# Patient Record
Sex: FEMALE | Race: WHITE | Employment: OTHER | URBAN - METROPOLITAN AREA
[De-identification: names, ages, dates, MRNs, and addresses within clinical notes are randomized per-mention and may not be internally consistent; named-entity substitution may affect disease eponyms.]

---

## 2017-09-29 ENCOUNTER — HOSPITAL ENCOUNTER (OUTPATIENT)
Dept: GENERAL RADIOLOGY | Age: 77
Discharge: HOME | End: 2017-09-29
Payer: MEDICARE

## 2017-09-29 ENCOUNTER — HOSPITAL ENCOUNTER (OUTPATIENT)
Age: 77
Discharge: HOME | End: 2017-09-29
Payer: MEDICARE

## 2017-09-29 DIAGNOSIS — M54.40 ACUTE RIGHT-SIDED LOW BACK PAIN WITH SCIATICA, SCIATICA LATERALITY UNSPECIFIED: ICD-10-CM

## 2017-09-29 PROCEDURE — 72114 X-RAY EXAM L-S SPINE BENDING: CPT

## 2017-09-29 PROCEDURE — 72170 X-RAY EXAM OF PELVIS: CPT

## 2018-09-06 ENCOUNTER — HOSPITAL ENCOUNTER (OUTPATIENT)
Dept: GENERAL RADIOLOGY | Age: 78
Discharge: HOME | End: 2018-09-08
Payer: MEDICARE

## 2018-09-06 ENCOUNTER — HOSPITAL ENCOUNTER (OUTPATIENT)
Age: 78
Discharge: HOME | End: 2018-09-08
Payer: MEDICARE

## 2018-09-06 ENCOUNTER — HOSPITAL ENCOUNTER (OUTPATIENT)
Age: 78
Discharge: HOME | End: 2018-09-06
Payer: MEDICARE

## 2018-09-06 DIAGNOSIS — M25.551 RIGHT HIP PAIN: ICD-10-CM

## 2018-09-06 DIAGNOSIS — M70.61 TROCHANTERIC BURSITIS OF RIGHT HIP: ICD-10-CM

## 2018-09-06 DIAGNOSIS — M54.41 ACUTE RIGHT-SIDED LOW BACK PAIN WITH RIGHT-SIDED SCIATICA: ICD-10-CM

## 2018-09-06 DIAGNOSIS — E78.00 HYPERCHOLESTEREMIA: ICD-10-CM

## 2018-09-06 LAB
ABSOLUTE EOS #: 0.83 K/UL (ref 0–0.4)
ABSOLUTE IMMATURE GRANULOCYTE: ABNORMAL K/UL (ref 0–0.3)
ABSOLUTE LYMPH #: 2.4 K/UL (ref 1–4.8)
ABSOLUTE MONO #: 0.9 K/UL (ref 0.1–1.3)
ALBUMIN SERPL-MCNC: 4.3 G/DL (ref 3.5–5.2)
ALBUMIN/GLOBULIN RATIO: ABNORMAL (ref 1–2.5)
ALP BLD-CCNC: 78 U/L (ref 35–104)
ALT SERPL-CCNC: 17 U/L (ref 5–33)
ANION GAP SERPL CALCULATED.3IONS-SCNC: 11 MMOL/L (ref 9–17)
AST SERPL-CCNC: 22 U/L
BASOPHILS # BLD: 0 % (ref 0–2)
BASOPHILS ABSOLUTE: 0 K/UL (ref 0–0.2)
BILIRUB SERPL-MCNC: 0.29 MG/DL (ref 0.3–1.2)
BUN BLDV-MCNC: 18 MG/DL (ref 8–23)
BUN/CREAT BLD: ABNORMAL (ref 9–20)
CALCIUM SERPL-MCNC: 9.7 MG/DL (ref 8.6–10.4)
CHLORIDE BLD-SCNC: 102 MMOL/L (ref 98–107)
CHOLESTEROL/HDL RATIO: 2.6
CHOLESTEROL: 145 MG/DL
CO2: 27 MMOL/L (ref 20–31)
CREAT SERPL-MCNC: 0.75 MG/DL (ref 0.5–0.9)
DIFFERENTIAL TYPE: ABNORMAL
EOSINOPHILS RELATIVE PERCENT: 11 % (ref 0–4)
GFR AFRICAN AMERICAN: >60 ML/MIN
GFR NON-AFRICAN AMERICAN: >60 ML/MIN
GFR SERPL CREATININE-BSD FRML MDRD: ABNORMAL ML/MIN/{1.73_M2}
GFR SERPL CREATININE-BSD FRML MDRD: ABNORMAL ML/MIN/{1.73_M2}
GLUCOSE BLD-MCNC: 109 MG/DL (ref 70–99)
HCT VFR BLD CALC: 42.1 % (ref 36–46)
HDLC SERPL-MCNC: 55 MG/DL
HEMOGLOBIN: 13.9 G/DL (ref 12–16)
IMMATURE GRANULOCYTES: ABNORMAL %
LDL CHOLESTEROL: 58 MG/DL (ref 0–130)
LYMPHOCYTES # BLD: 32 % (ref 24–44)
MCH RBC QN AUTO: 30.6 PG (ref 26–34)
MCHC RBC AUTO-ENTMCNC: 33.1 G/DL (ref 31–37)
MCV RBC AUTO: 92.5 FL (ref 80–100)
MONOCYTES # BLD: 12 % (ref 1–7)
MORPHOLOGY: NORMAL
NRBC AUTOMATED: ABNORMAL PER 100 WBC
PDW BLD-RTO: 13.6 % (ref 11.5–14.9)
PLATELET # BLD: 298 K/UL (ref 150–450)
PLATELET ESTIMATE: ABNORMAL
PMV BLD AUTO: 8.2 FL (ref 6–12)
POTASSIUM SERPL-SCNC: 4.6 MMOL/L (ref 3.7–5.3)
RBC # BLD: 4.55 M/UL (ref 4–5.2)
RBC # BLD: ABNORMAL 10*6/UL
SEG NEUTROPHILS: 45 % (ref 36–66)
SEGMENTED NEUTROPHILS ABSOLUTE COUNT: 3.37 K/UL (ref 1.3–9.1)
SODIUM BLD-SCNC: 140 MMOL/L (ref 135–144)
TOTAL PROTEIN: 7.7 G/DL (ref 6.4–8.3)
TRIGL SERPL-MCNC: 161 MG/DL
TSH SERPL DL<=0.05 MIU/L-ACNC: 2.43 MIU/L (ref 0.3–5)
VLDLC SERPL CALC-MCNC: ABNORMAL MG/DL (ref 1–30)
WBC # BLD: 7.5 K/UL (ref 3.5–11)
WBC # BLD: ABNORMAL 10*3/UL

## 2018-09-06 PROCEDURE — 80061 LIPID PANEL: CPT

## 2018-09-06 PROCEDURE — 36415 COLL VENOUS BLD VENIPUNCTURE: CPT

## 2018-09-06 PROCEDURE — 72100 X-RAY EXAM L-S SPINE 2/3 VWS: CPT

## 2018-09-06 PROCEDURE — 80053 COMPREHEN METABOLIC PANEL: CPT

## 2018-09-06 PROCEDURE — 84443 ASSAY THYROID STIM HORMONE: CPT

## 2018-09-06 PROCEDURE — 85025 COMPLETE CBC W/AUTO DIFF WBC: CPT

## 2018-09-06 PROCEDURE — 73502 X-RAY EXAM HIP UNI 2-3 VIEWS: CPT

## 2019-03-11 ENCOUNTER — HOSPITAL ENCOUNTER (OUTPATIENT)
Dept: CT IMAGING | Age: 79
Discharge: HOME | End: 2019-03-13
Payer: MEDICARE

## 2019-03-11 DIAGNOSIS — K43.2 INCISIONAL HERNIA, WITHOUT OBSTRUCTION OR GANGRENE: ICD-10-CM

## 2019-03-11 LAB
BUN BLDV-MCNC: 12 MG/DL (ref 8–23)
CREAT SERPL-MCNC: 0.69 MG/DL (ref 0.5–0.9)
GFR AFRICAN AMERICAN: >60 ML/MIN
GFR NON-AFRICAN AMERICAN: >60 ML/MIN
GFR SERPL CREATININE-BSD FRML MDRD: NORMAL ML/MIN/{1.73_M2}
GFR SERPL CREATININE-BSD FRML MDRD: NORMAL ML/MIN/{1.73_M2}

## 2019-03-11 PROCEDURE — 74177 CT ABD & PELVIS W/CONTRAST: CPT

## 2019-03-11 PROCEDURE — 6360000004 HC RX CONTRAST MEDICATION: Performed by: SURGERY

## 2019-03-11 PROCEDURE — 84520 ASSAY OF UREA NITROGEN: CPT

## 2019-03-11 PROCEDURE — 82565 ASSAY OF CREATININE: CPT

## 2019-03-11 PROCEDURE — 2580000003 HC RX 258: Performed by: SURGERY

## 2019-03-11 PROCEDURE — 36415 COLL VENOUS BLD VENIPUNCTURE: CPT

## 2019-03-11 RX ORDER — 0.9 % SODIUM CHLORIDE 0.9 %
80 INTRAVENOUS SOLUTION INTRAVENOUS ONCE
Status: COMPLETED | OUTPATIENT
Start: 2019-03-11 | End: 2019-03-11

## 2019-03-11 RX ORDER — SODIUM CHLORIDE 0.9 % (FLUSH) 0.9 %
10 SYRINGE (ML) INJECTION PRN
Status: DISCONTINUED | OUTPATIENT
Start: 2019-03-11 | End: 2019-03-14 | Stop reason: HOSPADM

## 2019-03-11 RX ADMIN — IOVERSOL 75 ML: 741 INJECTION INTRA-ARTERIAL; INTRAVENOUS at 16:32

## 2019-03-11 RX ADMIN — Medication 10 ML: at 16:32

## 2019-03-11 RX ADMIN — SODIUM CHLORIDE 80 ML: 9 INJECTION, SOLUTION INTRAVENOUS at 16:32

## 2019-03-11 RX ADMIN — IOHEXOL 50 ML: 240 INJECTION, SOLUTION INTRATHECAL; INTRAVASCULAR; INTRAVENOUS; ORAL at 16:32

## 2019-03-22 ENCOUNTER — HOSPITAL ENCOUNTER (OUTPATIENT)
Dept: PREADMISSION TESTING | Age: 79
Discharge: HOME | End: 2019-03-26
Payer: MEDICARE

## 2019-03-22 VITALS
TEMPERATURE: 97.6 F | HEART RATE: 69 BPM | BODY MASS INDEX: 46.38 KG/M2 | OXYGEN SATURATION: 98 % | DIASTOLIC BLOOD PRESSURE: 78 MMHG | RESPIRATION RATE: 16 BRPM | SYSTOLIC BLOOD PRESSURE: 153 MMHG | HEIGHT: 62 IN | WEIGHT: 252 LBS

## 2019-03-22 LAB
ABSOLUTE EOS #: 0.4 K/UL (ref 0–0.4)
ABSOLUTE IMMATURE GRANULOCYTE: ABNORMAL K/UL (ref 0–0.3)
ABSOLUTE LYMPH #: 2.3 K/UL (ref 1–4.8)
ABSOLUTE MONO #: 1 K/UL (ref 0.1–1.3)
ANION GAP SERPL CALCULATED.3IONS-SCNC: 11 MMOL/L (ref 9–17)
BASOPHILS # BLD: 1 % (ref 0–2)
BASOPHILS ABSOLUTE: 0.1 K/UL (ref 0–0.2)
BUN BLDV-MCNC: 12 MG/DL (ref 8–23)
BUN/CREAT BLD: NORMAL (ref 9–20)
CALCIUM SERPL-MCNC: 10.1 MG/DL (ref 8.6–10.4)
CHLORIDE BLD-SCNC: 100 MMOL/L (ref 98–107)
CO2: 28 MMOL/L (ref 20–31)
CREAT SERPL-MCNC: 0.72 MG/DL (ref 0.5–0.9)
DIFFERENTIAL TYPE: ABNORMAL
EOSINOPHILS RELATIVE PERCENT: 6 % (ref 0–4)
GFR AFRICAN AMERICAN: >60 ML/MIN
GFR NON-AFRICAN AMERICAN: >60 ML/MIN
GFR SERPL CREATININE-BSD FRML MDRD: NORMAL ML/MIN/{1.73_M2}
GFR SERPL CREATININE-BSD FRML MDRD: NORMAL ML/MIN/{1.73_M2}
GLUCOSE BLD-MCNC: 98 MG/DL (ref 70–99)
HCT VFR BLD CALC: 44.6 % (ref 36–46)
HEMOGLOBIN: 14.5 G/DL (ref 12–16)
IMMATURE GRANULOCYTES: ABNORMAL %
LYMPHOCYTES # BLD: 32 % (ref 24–44)
MCH RBC QN AUTO: 29.4 PG (ref 26–34)
MCHC RBC AUTO-ENTMCNC: 32.6 G/DL (ref 31–37)
MCV RBC AUTO: 90.3 FL (ref 80–100)
MONOCYTES # BLD: 14 % (ref 1–7)
NRBC AUTOMATED: ABNORMAL PER 100 WBC
PDW BLD-RTO: 13.9 % (ref 11.5–14.9)
PLATELET # BLD: 325 K/UL (ref 150–450)
PLATELET ESTIMATE: ABNORMAL
PMV BLD AUTO: 7.9 FL (ref 6–12)
POTASSIUM SERPL-SCNC: 4.9 MMOL/L (ref 3.7–5.3)
RBC # BLD: 4.94 M/UL (ref 4–5.2)
RBC # BLD: ABNORMAL 10*6/UL
SEG NEUTROPHILS: 47 % (ref 36–66)
SEGMENTED NEUTROPHILS ABSOLUTE COUNT: 3.4 K/UL (ref 1.3–9.1)
SODIUM BLD-SCNC: 139 MMOL/L (ref 135–144)
WBC # BLD: 7.3 K/UL (ref 3.5–11)
WBC # BLD: ABNORMAL 10*3/UL

## 2019-03-22 PROCEDURE — 80048 BASIC METABOLIC PNL TOTAL CA: CPT

## 2019-03-22 PROCEDURE — 85025 COMPLETE CBC W/AUTO DIFF WBC: CPT

## 2019-03-22 PROCEDURE — 93005 ELECTROCARDIOGRAM TRACING: CPT

## 2019-03-22 PROCEDURE — 36415 COLL VENOUS BLD VENIPUNCTURE: CPT

## 2019-03-22 RX ORDER — ASPIRIN 325 MG
325 TABLET ORAL DAILY
COMMUNITY

## 2019-03-22 RX ORDER — ESOMEPRAZOLE MAGNESIUM 20 MG/1
20 FOR SUSPENSION ORAL DAILY
COMMUNITY

## 2019-03-22 NOTE — H&P (VIEW-ONLY)
HISTORY and [facility]       NAME:  Valeriy Tripathi  MRN: [id number]   YOB: 1940   Date: 3/22/2019   Age: 69 y.o. Gender: female       COMPLAINT AND PRESENT HISTORY:   Valeriy Tripathi is 69 y.o.,  female, Preadmission Testing for  Incisional  Hernia. Patient will be having OPEN HERNIA INCISIONAL REPAIR W/MESH. Patient has symptoms of : discomfort with straining or lifting. Patient admits to straining with bowel movements. Patient noticed the Hernia 1 month ago. The Hernia grew in size. The Hernia is non tender,  expansile on coughing, no signs of bowel obstruction. No fever or chills. PAST MEDICAL HISTORY     Past Medical History:   Diagnosis Date    Arthritis     Borderline diabetic     controlled with diet    CAD (coronary artery disease)     Constipation     GERD (gastroesophageal reflux disease)     Hyperlipidemia     Hypertension     Incisional hernia     Wears glasses        Pt denies any history of Diabetes mellitus type 2,  stroke, , COPD, Asthma,  Cancer, Seizures,Thyroid disease, Kidney Disease, Hepatitis, TB, Psychiatric Disorders or Substance abuse. SURGICAL HISTORY       Past Surgical History:   Procedure Laterality Date    CARPAL TUNNEL RELEASE Bilateral     2 times on the right and 2 times on the left    CHOLECYSTECTOMY      HYSTERECTOMY  1979    \"they took part of the uterus. I still have periods but they were every other month\", doesn't know why    KNEE ARTHROSCOPY Left     NECK SURGERY  2001    titanium screw in neck by Dr. Tyler Louie Right        FAMILY HISTORY       Family History   Problem Relation Age of Onset    Diabetes Mother     Heart Failure Mother     Heart Attack Father        SOCIAL HISTORY       Social History     Socioeconomic History    Marital status:       Spouse name: None    Number of children: None    Years of education: None    Highest education level: None Occupational History    None   Social Needs    Financial resource strain: None    Food insecurity:     Worry: None     Inability: None    Transportation needs:     Medical: None     Non-medical: None   Tobacco Use    Smoking status: Never Smoker    Smokeless tobacco: Never Used   Substance and Sexual Activity    Alcohol use: No     Alcohol/week: 0.0 oz    Drug use: Never    Sexual activity: None   Lifestyle    Physical activity:     Days per week: None     Minutes per session: None    Stress: None   Relationships    Social connections:     Talks on phone: None     Gets together: None     Attends Gnosticism service: None     Active member of club or organization: None     Attends meetings of clubs or organizations: None     Relationship status: None    Intimate partner violence:     Fear of current or ex partner: None     Emotionally abused: None     Physically abused: None     Forced sexual activity: None   Other Topics Concern    None   Social History Narrative    None           REVIEW OF SYSTEMS      Allergies   Allergen Reactions    Medrol [Methylprednisolone] Other (See Comments)     Unknown reaction       Current Outpatient Medications on File Prior to Encounter   Medication Sig Dispense Refill    esomeprazole Magnesium (NEXIUM) 20 MG PACK Take 20 mg by mouth daily      aspirin 325 MG tablet Take 162.5 mg by mouth daily Takes 1/2 tablet of aspirin      simvastatin (ZOCOR) 40 MG tablet Take 1 tablet by mouth nightly 90 tablet 1    metoprolol tartrate (LOPRESSOR) 50 MG tablet Take 1 tablet by mouth 2 times daily 180 tablet 1    lisinopril (PRINIVIL;ZESTRIL) 40 MG tablet Take 1/2 tab by mouth daily 90 tablet 1    isosorbide mononitrate (IMDUR) 60 MG extended release tablet Take 1 tablet by mouth daily 90 tablet 1    amLODIPine (NORVASC) 5 MG tablet Take 1 tablet by mouth daily 90 tablet 1    ibuprofen (ADVIL;MOTRIN) 800 MG tablet Take 1 tablet by mouth every 6 hours as needed for Pain 120 tablet 3     No current facility-administered medications on file prior to encounter. General health:  Fairly good. No fever or chills. Skin:  No itching, redness or rash. HEENT:  No headache, epistaxis or sore throat. Neck:  No pain, stiffness or masses. Cardiovascular/Respiratory system:  No chest pain, palpitation or shortness of breath. Gastrointestinal tract: See HPI. Genitourinary:  No burning on micturition. No hesitancy, urgency, frequency or discoloration of urine. Locomotor:  No bone or joint pains. No swelling. Neuropsychiatric:  No referable complaints. GENERAL PHYSICAL EXAM:     Vitals: BP (!) 153/78   Pulse 69   Temp 97.6 °F (36.4 °C) (Oral)   Resp 16   Ht 5' 2\" (1.575 m)   Wt 252 lb (114.3 kg)   SpO2 98%   BMI 46.09 kg/m²  Body mass index is 46.09 kg/m². GENERAL APPEARANCE:   Elizabeth Lazcano is 69 y.o.,  female, severely obese, nourished, conscious, alert. Does not appear to be distress or pain at this time. SKIN:  Warm, dry, no cyanosis or jaundice. HEAD:  Normocephalic, atraumatic, no swelling or tenderness. EYES:  Pupils equal, reactive to light. EARS:  No discharge, no marked hearing loss. NOSE:  No rhinorrhea, epistaxis or septal deformity. THROAT:  Not congested. No ulceration bleeding or discharge. NECK:  No stiffness, trachea central.  No palpable masses or L.N.                 CHEST:  Symmetrical and equal on expansion. HEART:  RRR S1 > S2. No audible murmurs or gallops. LUNGS:  Equal on expansion, normal breath sounds. No adventitious sounds. ABDOMEN:  Obese. Soft on palpation. No localized tenderness. No guarding or rigidity. No palpable hepatosplenomegaly.

## 2019-04-05 ENCOUNTER — ANESTHESIA (OUTPATIENT)
Dept: OPERATING ROOM | Age: 79
End: 2019-04-05
Payer: MEDICARE

## 2019-04-05 ENCOUNTER — HOSPITAL ENCOUNTER (OUTPATIENT)
Age: 79
Setting detail: OUTPATIENT SURGERY
Discharge: HOME | End: 2019-04-05
Attending: SURGERY | Admitting: SURGERY
Payer: MEDICARE

## 2019-04-05 ENCOUNTER — ANESTHESIA EVENT (OUTPATIENT)
Dept: OPERATING ROOM | Age: 79
End: 2019-04-05
Payer: MEDICARE

## 2019-04-05 VITALS — DIASTOLIC BLOOD PRESSURE: 87 MMHG | TEMPERATURE: 95.4 F | OXYGEN SATURATION: 92 % | SYSTOLIC BLOOD PRESSURE: 176 MMHG

## 2019-04-05 VITALS
SYSTOLIC BLOOD PRESSURE: 110 MMHG | HEART RATE: 55 BPM | RESPIRATION RATE: 16 BRPM | HEIGHT: 62 IN | BODY MASS INDEX: 46.38 KG/M2 | TEMPERATURE: 97 F | WEIGHT: 252 LBS | DIASTOLIC BLOOD PRESSURE: 59 MMHG | OXYGEN SATURATION: 100 %

## 2019-04-05 DIAGNOSIS — K43.2 INCISIONAL HERNIA WITHOUT OBSTRUCTION OR GANGRENE: Primary | ICD-10-CM

## 2019-04-05 LAB — GLUCOSE BLD-MCNC: 111 MG/DL (ref 65–105)

## 2019-04-05 PROCEDURE — 7100000031 HC ASPR PHASE II RECOVERY - ADDTL 15 MIN: Performed by: SURGERY

## 2019-04-05 PROCEDURE — 2500000003 HC RX 250 WO HCPCS: Performed by: SURGERY

## 2019-04-05 PROCEDURE — 7100000011 HC PHASE II RECOVERY - ADDTL 15 MIN: Performed by: SURGERY

## 2019-04-05 PROCEDURE — 7100000010 HC PHASE II RECOVERY - FIRST 15 MIN: Performed by: SURGERY

## 2019-04-05 PROCEDURE — 6360000002 HC RX W HCPCS: Performed by: ANESTHESIOLOGY

## 2019-04-05 PROCEDURE — 3700000001 HC ADD 15 MINUTES (ANESTHESIA): Performed by: SURGERY

## 2019-04-05 PROCEDURE — 3600000003 HC SURGERY LEVEL 3 BASE: Performed by: SURGERY

## 2019-04-05 PROCEDURE — 82947 ASSAY GLUCOSE BLOOD QUANT: CPT

## 2019-04-05 PROCEDURE — 3700000000 HC ANESTHESIA ATTENDED CARE: Performed by: SURGERY

## 2019-04-05 PROCEDURE — 6360000002 HC RX W HCPCS: Performed by: NURSE ANESTHETIST, CERTIFIED REGISTERED

## 2019-04-05 PROCEDURE — 2580000003 HC RX 258: Performed by: NURSE ANESTHETIST, CERTIFIED REGISTERED

## 2019-04-05 PROCEDURE — 2709999900 HC NON-CHARGEABLE SUPPLY: Performed by: SURGERY

## 2019-04-05 PROCEDURE — 7100000000 HC PACU RECOVERY - FIRST 15 MIN: Performed by: SURGERY

## 2019-04-05 PROCEDURE — 2500000003 HC RX 250 WO HCPCS: Performed by: NURSE ANESTHETIST, CERTIFIED REGISTERED

## 2019-04-05 PROCEDURE — 7100000001 HC PACU RECOVERY - ADDTL 15 MIN: Performed by: SURGERY

## 2019-04-05 PROCEDURE — 6360000002 HC RX W HCPCS: Performed by: SURGERY

## 2019-04-05 PROCEDURE — C1781 MESH (IMPLANTABLE): HCPCS | Performed by: SURGERY

## 2019-04-05 PROCEDURE — 7100000030 HC ASPR PHASE II RECOVERY - FIRST 15 MIN: Performed by: SURGERY

## 2019-04-05 PROCEDURE — 3600000013 HC SURGERY LEVEL 3 ADDTL 15MIN: Performed by: SURGERY

## 2019-04-05 PROCEDURE — 88302 TISSUE EXAM BY PATHOLOGIST: CPT

## 2019-04-05 DEVICE — PATCH HERN M DIA2.5IN CIR W/ STRP SEPRA TECHNOLOGY ABSRB: Type: IMPLANTABLE DEVICE | Site: ABDOMEN | Status: FUNCTIONAL

## 2019-04-05 RX ORDER — SODIUM CHLORIDE, SODIUM LACTATE, POTASSIUM CHLORIDE, CALCIUM CHLORIDE 600; 310; 30; 20 MG/100ML; MG/100ML; MG/100ML; MG/100ML
INJECTION, SOLUTION INTRAVENOUS CONTINUOUS PRN
Status: DISCONTINUED | OUTPATIENT
Start: 2019-04-05 | End: 2019-04-05 | Stop reason: SDUPTHER

## 2019-04-05 RX ORDER — ONDANSETRON 4 MG/1
TABLET, FILM COATED ORAL
Qty: 20 TABLET | Refills: 0 | Status: SHIPPED | OUTPATIENT
Start: 2019-04-05 | End: 2019-04-24 | Stop reason: ALTCHOICE

## 2019-04-05 RX ORDER — OXYCODONE HYDROCHLORIDE AND ACETAMINOPHEN 5; 325 MG/1; MG/1
2 TABLET ORAL PRN
Status: DISCONTINUED | OUTPATIENT
Start: 2019-04-05 | End: 2019-04-05 | Stop reason: HOSPADM

## 2019-04-05 RX ORDER — BUPIVACAINE HYDROCHLORIDE 5 MG/ML
INJECTION, SOLUTION EPIDURAL; INTRACAUDAL PRN
Status: DISCONTINUED | OUTPATIENT
Start: 2019-04-05 | End: 2019-04-05 | Stop reason: ALTCHOICE

## 2019-04-05 RX ORDER — OXYCODONE HYDROCHLORIDE AND ACETAMINOPHEN 5; 325 MG/1; MG/1
1 TABLET ORAL PRN
Status: DISCONTINUED | OUTPATIENT
Start: 2019-04-05 | End: 2019-04-05 | Stop reason: HOSPADM

## 2019-04-05 RX ORDER — ROCURONIUM BROMIDE 10 MG/ML
INJECTION, SOLUTION INTRAVENOUS PRN
Status: DISCONTINUED | OUTPATIENT
Start: 2019-04-05 | End: 2019-04-05 | Stop reason: SDUPTHER

## 2019-04-05 RX ORDER — OXYCODONE HYDROCHLORIDE AND ACETAMINOPHEN 5; 325 MG/1; MG/1
1 TABLET ORAL EVERY 6 HOURS PRN
Qty: 28 TABLET | Refills: 0 | Status: SHIPPED | OUTPATIENT
Start: 2019-04-05 | End: 2019-04-12

## 2019-04-05 RX ORDER — NEOSTIGMINE METHYLSULFATE 5 MG/5 ML
SYRINGE (ML) INTRAVENOUS PRN
Status: DISCONTINUED | OUTPATIENT
Start: 2019-04-05 | End: 2019-04-05 | Stop reason: SDUPTHER

## 2019-04-05 RX ORDER — LABETALOL HYDROCHLORIDE 5 MG/ML
5 INJECTION, SOLUTION INTRAVENOUS EVERY 10 MIN PRN
Status: DISCONTINUED | OUTPATIENT
Start: 2019-04-05 | End: 2019-04-05 | Stop reason: HOSPADM

## 2019-04-05 RX ORDER — DIPHENHYDRAMINE HYDROCHLORIDE 50 MG/ML
12.5 INJECTION INTRAMUSCULAR; INTRAVENOUS
Status: DISCONTINUED | OUTPATIENT
Start: 2019-04-05 | End: 2019-04-05 | Stop reason: HOSPADM

## 2019-04-05 RX ORDER — LIDOCAINE HYDROCHLORIDE 10 MG/ML
INJECTION, SOLUTION EPIDURAL; INFILTRATION; INTRACAUDAL; PERINEURAL PRN
Status: DISCONTINUED | OUTPATIENT
Start: 2019-04-05 | End: 2019-04-05 | Stop reason: SDUPTHER

## 2019-04-05 RX ORDER — GLYCOPYRROLATE 1 MG/5 ML
SYRINGE (ML) INTRAVENOUS PRN
Status: DISCONTINUED | OUTPATIENT
Start: 2019-04-05 | End: 2019-04-05 | Stop reason: SDUPTHER

## 2019-04-05 RX ORDER — PROPOFOL 10 MG/ML
INJECTION, EMULSION INTRAVENOUS PRN
Status: DISCONTINUED | OUTPATIENT
Start: 2019-04-05 | End: 2019-04-05 | Stop reason: SDUPTHER

## 2019-04-05 RX ORDER — ONDANSETRON 2 MG/ML
INJECTION INTRAMUSCULAR; INTRAVENOUS PRN
Status: DISCONTINUED | OUTPATIENT
Start: 2019-04-05 | End: 2019-04-05 | Stop reason: SDUPTHER

## 2019-04-05 RX ORDER — MIDAZOLAM HYDROCHLORIDE 1 MG/ML
INJECTION INTRAMUSCULAR; INTRAVENOUS PRN
Status: DISCONTINUED | OUTPATIENT
Start: 2019-04-05 | End: 2019-04-05 | Stop reason: SDUPTHER

## 2019-04-05 RX ORDER — SODIUM CHLORIDE, SODIUM LACTATE, POTASSIUM CHLORIDE, CALCIUM CHLORIDE 600; 310; 30; 20 MG/100ML; MG/100ML; MG/100ML; MG/100ML
INJECTION, SOLUTION INTRAVENOUS CONTINUOUS
Status: DISCONTINUED | OUTPATIENT
Start: 2019-04-05 | End: 2019-04-05 | Stop reason: HOSPADM

## 2019-04-05 RX ORDER — EPHEDRINE SULFATE 50 MG/ML
INJECTION, SOLUTION INTRAVENOUS PRN
Status: DISCONTINUED | OUTPATIENT
Start: 2019-04-05 | End: 2019-04-05 | Stop reason: SDUPTHER

## 2019-04-05 RX ORDER — PROMETHAZINE HYDROCHLORIDE 25 MG/ML
6.25 INJECTION, SOLUTION INTRAMUSCULAR; INTRAVENOUS
Status: DISCONTINUED | OUTPATIENT
Start: 2019-04-05 | End: 2019-04-05 | Stop reason: CLARIF

## 2019-04-05 RX ORDER — CEPHALEXIN 500 MG/1
CAPSULE ORAL
Qty: 21 CAPSULE | Refills: 0 | Status: SHIPPED | OUTPATIENT
Start: 2019-04-05 | End: 2019-04-24 | Stop reason: ALTCHOICE

## 2019-04-05 RX ORDER — MEPERIDINE HYDROCHLORIDE 50 MG/ML
12.5 INJECTION INTRAMUSCULAR; INTRAVENOUS; SUBCUTANEOUS EVERY 5 MIN PRN
Status: DISCONTINUED | OUTPATIENT
Start: 2019-04-05 | End: 2019-04-05 | Stop reason: HOSPADM

## 2019-04-05 RX ORDER — FENTANYL CITRATE 50 UG/ML
INJECTION, SOLUTION INTRAMUSCULAR; INTRAVENOUS PRN
Status: DISCONTINUED | OUTPATIENT
Start: 2019-04-05 | End: 2019-04-05 | Stop reason: SDUPTHER

## 2019-04-05 RX ADMIN — ONDANSETRON 4 MG: 2 INJECTION INTRAMUSCULAR; INTRAVENOUS at 09:54

## 2019-04-05 RX ADMIN — SODIUM CHLORIDE, POTASSIUM CHLORIDE, SODIUM LACTATE AND CALCIUM CHLORIDE: 600; 310; 30; 20 INJECTION, SOLUTION INTRAVENOUS at 08:53

## 2019-04-05 RX ADMIN — PROPOFOL 140 MG: 10 INJECTION, EMULSION INTRAVENOUS at 08:58

## 2019-04-05 RX ADMIN — FENTANYL CITRATE 100 MCG: 50 INJECTION, SOLUTION INTRAMUSCULAR; INTRAVENOUS at 08:58

## 2019-04-05 RX ADMIN — MIDAZOLAM 2 MG: 1 INJECTION INTRAMUSCULAR; INTRAVENOUS at 08:53

## 2019-04-05 RX ADMIN — Medication 5 MG: at 10:07

## 2019-04-05 RX ADMIN — Medication 0.8 MG: at 10:07

## 2019-04-05 RX ADMIN — EPHEDRINE SULFATE 10 MG: 50 INJECTION INTRAMUSCULAR; INTRAVENOUS; SUBCUTANEOUS at 09:24

## 2019-04-05 RX ADMIN — ROCURONIUM BROMIDE 50 MG: 10 INJECTION INTRAVENOUS at 08:58

## 2019-04-05 RX ADMIN — HYDROMORPHONE HYDROCHLORIDE 0.25 MG: 1 INJECTION, SOLUTION INTRAMUSCULAR; INTRAVENOUS; SUBCUTANEOUS at 10:57

## 2019-04-05 RX ADMIN — ROCURONIUM BROMIDE 20 MG: 10 INJECTION INTRAVENOUS at 09:38

## 2019-04-05 RX ADMIN — Medication 2 G: at 09:14

## 2019-04-05 RX ADMIN — SUGAMMADEX 200 MG: 100 INJECTION, SOLUTION INTRAVENOUS at 10:25

## 2019-04-05 RX ADMIN — LIDOCAINE HYDROCHLORIDE 50 MG: 10 INJECTION, SOLUTION EPIDURAL; INFILTRATION; INTRACAUDAL; PERINEURAL at 08:58

## 2019-04-05 RX ADMIN — HYDROMORPHONE HYDROCHLORIDE 0.5 MG: 1 INJECTION, SOLUTION INTRAMUSCULAR; INTRAVENOUS; SUBCUTANEOUS at 10:43

## 2019-04-05 ASSESSMENT — PULMONARY FUNCTION TESTS
PIF_VALUE: 26
PIF_VALUE: 2
PIF_VALUE: 2
PIF_VALUE: 26
PIF_VALUE: 27
PIF_VALUE: 27
PIF_VALUE: 28
PIF_VALUE: 27
PIF_VALUE: 26
PIF_VALUE: 27
PIF_VALUE: 27
PIF_VALUE: 26
PIF_VALUE: 1
PIF_VALUE: 27
PIF_VALUE: 26
PIF_VALUE: 1
PIF_VALUE: 26
PIF_VALUE: 3
PIF_VALUE: 1
PIF_VALUE: 1
PIF_VALUE: 26
PIF_VALUE: 26
PIF_VALUE: 1
PIF_VALUE: 2
PIF_VALUE: 26
PIF_VALUE: 26
PIF_VALUE: 14
PIF_VALUE: 18
PIF_VALUE: 23
PIF_VALUE: 26
PIF_VALUE: 13
PIF_VALUE: 26
PIF_VALUE: 27
PIF_VALUE: 26
PIF_VALUE: 26
PIF_VALUE: 33
PIF_VALUE: 26
PIF_VALUE: 12
PIF_VALUE: 3
PIF_VALUE: 26
PIF_VALUE: 26
PIF_VALUE: 2
PIF_VALUE: 14
PIF_VALUE: 26
PIF_VALUE: 4
PIF_VALUE: 2
PIF_VALUE: 26
PIF_VALUE: 27
PIF_VALUE: 14
PIF_VALUE: 26
PIF_VALUE: 27
PIF_VALUE: 22
PIF_VALUE: 26
PIF_VALUE: 28
PIF_VALUE: 18
PIF_VALUE: 26
PIF_VALUE: 28
PIF_VALUE: 10
PIF_VALUE: 27
PIF_VALUE: 26
PIF_VALUE: 2
PIF_VALUE: 14
PIF_VALUE: 36
PIF_VALUE: 23
PIF_VALUE: 19
PIF_VALUE: 26
PIF_VALUE: 26
PIF_VALUE: 27
PIF_VALUE: 26
PIF_VALUE: 26
PIF_VALUE: 30
PIF_VALUE: 27
PIF_VALUE: 26

## 2019-04-05 ASSESSMENT — PAIN SCALES - GENERAL
PAINLEVEL_OUTOF10: 0
PAINLEVEL_OUTOF10: 6
PAINLEVEL_OUTOF10: 6
PAINLEVEL_OUTOF10: 7

## 2019-04-05 ASSESSMENT — PAIN DESCRIPTION - LOCATION
LOCATION: ABDOMEN
LOCATION: ABDOMEN

## 2019-04-05 ASSESSMENT — PAIN DESCRIPTION - PAIN TYPE
TYPE: SURGICAL PAIN
TYPE: SURGICAL PAIN

## 2019-04-05 ASSESSMENT — PAIN DESCRIPTION - DESCRIPTORS
DESCRIPTORS: ACHING
DESCRIPTORS: ACHING

## 2019-04-05 ASSESSMENT — PAIN - FUNCTIONAL ASSESSMENT: PAIN_FUNCTIONAL_ASSESSMENT: 0-10

## 2019-04-05 NOTE — INTERVAL H&P NOTE
HISTORY and [facility]       NAME:  Kiko Rae  MRN: [id number]   YOB: 1940   Date: 4/5/2019   Age: 69 y.o. Gender: female     H&P Update Note    H&P from 3/22/2019 reviewed and updated. Patient examined. INTERVAL HISTORY:     Patient is feeling well today, denies any fever/chills, chest pain, shortness of breath. No interval changes. No interval changes to past medical history, social history, family history. Review of systems as stated above and otherwise negative. PHYSICAL EXAM:     Vitals: BP (!) 152/67   Pulse 57   Temp 97.7 °F (36.5 °C) (Oral)   Resp 20   Ht 5' 2\" (1.575 m)   Wt 252 lb (114.3 kg)   SpO2 97%   BMI 46.09 kg/m²  Body mass index is 46.09 kg/m². Patient is alert and oriented, in no distress. Hypertensive. Heart rate bradycardic and rhythm regular. Lungs clear to auscultation bilaterally. Abdomen is soft, non tender. 1+ non pitting LE edema. No interval changes. I concur with the findings.      Corbin Valdes, LILLIAN - CNP on 4/5/2019 at 8:28 AM

## 2019-04-05 NOTE — OP NOTE
stable condition.     Recommendations: Operative findings are discussed with the patient's family at length. Postoperative care discussed. Prescriptions are in the chart.

## 2019-04-05 NOTE — ANESTHESIA PRE PROCEDURE
Department of Anesthesiology  Preprocedure Note       Name:  Helen Eddy   Age:  69 y.o.  :  1940                                          MRN:  [id number]         Date:  2019      Surgeon: Rene Chavezal):  Cordelia Ferguson MD    Procedure: OPEN HERNIA INCISIONAL REPAIR W/MESH (N/A Abdomen)    Medications prior to admission:   Prior to Admission medications    Medication Sig Start Date End Date Taking? Authorizing Provider   amLODIPine (NORVASC) 5 MG tablet Take 1 tablet by mouth daily 19  Yes Caro Toro MD   esomeprazole Magnesium (NEXIUM) 20 MG PACK Take 20 mg by mouth daily   Yes Historical Provider, MD   simvastatin (ZOCOR) 40 MG tablet Take 1 tablet by mouth nightly 18  Yes JESSICA Croft   metoprolol tartrate (LOPRESSOR) 50 MG tablet Take 1 tablet by mouth 2 times daily 18  Yes JESSICA Croft   lisinopril (PRINIVIL;ZESTRIL) 40 MG tablet Take 1/2 tab by mouth daily 18  Yes JESSICA Croft   isosorbide mononitrate (IMDUR) 60 MG extended release tablet Take 1 tablet by mouth daily 18  Yes JESSICA Croft   aspirin 325 MG tablet Take 162.5 mg by mouth daily Takes 1/2 tablet of aspirin    Historical Provider, MD   ibuprofen (ADVIL;MOTRIN) 800 MG tablet Take 1 tablet by mouth every 6 hours as needed for Pain 16   Caro Toro MD       Current medications:    Current Facility-Administered Medications   Medication Dose Route Frequency Provider Last Rate Last Dose    lactated ringers infusion   Intravenous Continuous Thanh Mcghee MD        ceFAZolin (ANCEF) 2 g in dextrose 5 % 50 mL IVPB  2 g Intravenous Once Cordelia Ferguson MD           Allergies:     Allergies   Allergen Reactions    Medrol [Methylprednisolone] Other (See Comments)     Unknown reaction       Problem List:    Patient Active Problem List   Diagnosis Code    CAD (coronary artery disease) I25.10    Acid reflux K21.9    HTN (hypertension) I10    Hypercholesteremia E78.00       Past Medical History:        Diagnosis Date    Arthritis     Borderline diabetic     controlled with diet    CAD (coronary artery disease)     Constipation     GERD (gastroesophageal reflux disease)     Hyperlipidemia     Hypertension     Incisional hernia     Wears glasses        Past Surgical History:        Procedure Laterality Date    CARPAL TUNNEL RELEASE Bilateral     2 times on the right and 2 times on the left    CHOLECYSTECTOMY      HYSTERECTOMY  1979    \"they took part of the uterus. I still have periods but they were every other month\", doesn't know why    KNEE ARTHROSCOPY Left     NECK SURGERY  2001    titanium screw in neck by Dr. Indra Toledo Right        Social History:    Social History     Tobacco Use    Smoking status: Never Smoker    Smokeless tobacco: Never Used   Substance Use Topics    Alcohol use: No     Alcohol/week: 0.0 oz                                Counseling given: Not Answered      Vital Signs (Current):   Vitals:    04/05/19 0759   BP: (!) 152/67   Pulse: 57   Resp: 20   Temp: 97.7 °F (36.5 °C)   TempSrc: Oral   SpO2: 97%   Weight: 252 lb (114.3 kg)   Height: 5' 2\" (1.575 m)                                              BP Readings from Last 3 Encounters:   04/05/19 (!) 152/67   03/22/19 (!) 153/78   03/13/19 (!) 140/80       NPO Status: Time of last liquid consumption: 2300                        Time of last solid consumption: 1800                        Date of last liquid consumption: 04/04/19                        Date of last solid food consumption: 04/04/19    BMI:   Wt Readings from Last 3 Encounters:   04/05/19 252 lb (114.3 kg)   03/22/19 252 lb (114.3 kg)   03/13/19 254 lb (115.2 kg)     Body mass index is 46.09 kg/m².     CBC:   Lab Results   Component Value Date    WBC 7.3 03/22/2019    RBC 4.94 03/22/2019    HGB 14.5 03/22/2019    HCT 44.6 03/22/2019    MCV 90.3 03/22/2019    RDW 13.9 03/22/2019    PLT 325 03/22/2019       CMP:   Lab Results   Component Value Date     03/22/2019    K 4.9 03/22/2019     03/22/2019    CO2 28 03/22/2019    BUN 12 03/22/2019    CREATININE 0.72 03/22/2019    GFRAA >60 03/22/2019    LABGLOM >60 03/22/2019    GLUCOSE 98 03/22/2019    PROT 7.7 09/06/2018    CALCIUM 10.1 03/22/2019    BILITOT 0.29 09/06/2018    ALKPHOS 78 09/06/2018    AST 22 09/06/2018    ALT 17 09/06/2018       POC Tests: No results for input(s): POCGLU, POCNA, POCK, POCCL, POCBUN, POCHEMO, POCHCT in the last 72 hours. Coags: No results found for: PROTIME, INR, APTT    HCG (If Applicable): No results found for: PREGTESTUR, PREGSERUM, HCG, HCGQUANT     ABGs: No results found for: PHART, PO2ART, LTL0TCM, CCW9LAG, BEART, D9KGVWXS     Type & Screen (If Applicable):  No results found for: LABABO, [address]    Anesthesia Evaluation  Patient summary reviewed and Nursing notes reviewed no history of anesthetic complications:   Airway: Mallampati: III  TM distance: >3 FB   Neck ROM: limited  Mouth opening: > = 3 FB Dental: normal exam         Pulmonary:Negative Pulmonary ROS and normal exam                               Cardiovascular:    (+) hypertension:, CAD:, ALBARRAN:,                   Neuro/Psych:   Negative Neuro/Psych ROS              GI/Hepatic/Renal:   (+) GERD:, morbid obesity          Endo/Other: Negative Endo/Other ROS                    Abdominal:           Vascular:                                        Anesthesia Plan      general     ASA 3       Induction: intravenous. MIPS: Postoperative opioids intended and Prophylactic antiemetics administered. Anesthetic plan and risks discussed with patient. Plan discussed with CRNA.                   Suha Mckeon MD   4/5/2019

## 2019-04-08 LAB — SURGICAL PATHOLOGY REPORT: NORMAL

## 2019-05-15 LAB
EKG ATRIAL RATE: 62 BPM
EKG P AXIS: 19 DEGREES
EKG P-R INTERVAL: 176 MS
EKG Q-T INTERVAL: 402 MS
EKG QRS DURATION: 86 MS
EKG QTC CALCULATION (BAZETT): 408 MS
EKG R AXIS: -5 DEGREES
EKG T AXIS: 46 DEGREES
EKG VENTRICULAR RATE: 62 BPM

## 2019-12-05 ENCOUNTER — HOSPITAL ENCOUNTER (OUTPATIENT)
Dept: WOMENS IMAGING | Age: 79
Discharge: HOME | End: 2019-12-07
Payer: MEDICARE

## 2019-12-05 ENCOUNTER — HOSPITAL ENCOUNTER (OUTPATIENT)
Age: 79
Discharge: HOME | End: 2019-12-05
Payer: MEDICARE

## 2019-12-05 DIAGNOSIS — E78.5 HYPERLIPIDEMIA, UNSPECIFIED HYPERLIPIDEMIA TYPE: ICD-10-CM

## 2019-12-05 DIAGNOSIS — Z00.00 ROUTINE GENERAL MEDICAL EXAMINATION AT A HEALTH CARE FACILITY: ICD-10-CM

## 2019-12-05 DIAGNOSIS — Z78.0 POSTMENOPAUSAL STATE: ICD-10-CM

## 2019-12-05 DIAGNOSIS — I10 HYPERTENSION, UNSPECIFIED TYPE: ICD-10-CM

## 2019-12-05 LAB
ABSOLUTE EOS #: 0.6 K/UL (ref 0–0.4)
ABSOLUTE IMMATURE GRANULOCYTE: ABNORMAL K/UL (ref 0–0.3)
ABSOLUTE LYMPH #: 2.3 K/UL (ref 1–4.8)
ABSOLUTE MONO #: 1 K/UL (ref 0.1–1.3)
ALBUMIN SERPL-MCNC: 3.8 G/DL (ref 3.5–5.2)
ALBUMIN/GLOBULIN RATIO: ABNORMAL (ref 1–2.5)
ALP BLD-CCNC: 84 U/L (ref 35–104)
ALT SERPL-CCNC: 14 U/L (ref 5–33)
ANION GAP SERPL CALCULATED.3IONS-SCNC: 11 MMOL/L (ref 9–17)
AST SERPL-CCNC: 19 U/L
BASOPHILS # BLD: 1 % (ref 0–2)
BASOPHILS ABSOLUTE: 0.1 K/UL (ref 0–0.2)
BILIRUB SERPL-MCNC: 0.29 MG/DL (ref 0.3–1.2)
BUN BLDV-MCNC: 13 MG/DL (ref 8–23)
BUN/CREAT BLD: ABNORMAL (ref 9–20)
CALCIUM SERPL-MCNC: 9.8 MG/DL (ref 8.6–10.4)
CHLORIDE BLD-SCNC: 101 MMOL/L (ref 98–107)
CHOLESTEROL/HDL RATIO: 2.6
CHOLESTEROL: 128 MG/DL
CO2: 26 MMOL/L (ref 20–31)
CREAT SERPL-MCNC: 0.7 MG/DL (ref 0.5–0.9)
DIFFERENTIAL TYPE: ABNORMAL
EOSINOPHILS RELATIVE PERCENT: 8 % (ref 0–4)
GFR AFRICAN AMERICAN: >60 ML/MIN
GFR NON-AFRICAN AMERICAN: >60 ML/MIN
GFR SERPL CREATININE-BSD FRML MDRD: ABNORMAL ML/MIN/{1.73_M2}
GFR SERPL CREATININE-BSD FRML MDRD: ABNORMAL ML/MIN/{1.73_M2}
GLUCOSE BLD-MCNC: 101 MG/DL (ref 70–99)
HCT VFR BLD CALC: 39.6 % (ref 36–46)
HDLC SERPL-MCNC: 49 MG/DL
HEMOGLOBIN: 13.2 G/DL (ref 12–16)
IMMATURE GRANULOCYTES: ABNORMAL %
LDL CHOLESTEROL: 52 MG/DL (ref 0–130)
LYMPHOCYTES # BLD: 29 % (ref 24–44)
MCH RBC QN AUTO: 30.4 PG (ref 26–34)
MCHC RBC AUTO-ENTMCNC: 33.4 G/DL (ref 31–37)
MCV RBC AUTO: 91.2 FL (ref 80–100)
MONOCYTES # BLD: 13 % (ref 1–7)
NRBC AUTOMATED: ABNORMAL PER 100 WBC
PDW BLD-RTO: 13.3 % (ref 11.5–14.9)
PLATELET # BLD: 292 K/UL (ref 150–450)
PLATELET ESTIMATE: ABNORMAL
PMV BLD AUTO: 7.6 FL (ref 6–12)
POTASSIUM SERPL-SCNC: 4.6 MMOL/L (ref 3.7–5.3)
RBC # BLD: 4.34 M/UL (ref 4–5.2)
RBC # BLD: ABNORMAL 10*6/UL
SEG NEUTROPHILS: 49 % (ref 36–66)
SEGMENTED NEUTROPHILS ABSOLUTE COUNT: 4.1 K/UL (ref 1.3–9.1)
SODIUM BLD-SCNC: 138 MMOL/L (ref 135–144)
TOTAL PROTEIN: 7.5 G/DL (ref 6.4–8.3)
TRIGL SERPL-MCNC: 133 MG/DL
TSH SERPL DL<=0.05 MIU/L-ACNC: 3.57 MIU/L (ref 0.3–5)
VLDLC SERPL CALC-MCNC: NORMAL MG/DL (ref 1–30)
WBC # BLD: 8.1 K/UL (ref 3.5–11)
WBC # BLD: ABNORMAL 10*3/UL

## 2019-12-05 PROCEDURE — 85025 COMPLETE CBC W/AUTO DIFF WBC: CPT

## 2019-12-05 PROCEDURE — 36415 COLL VENOUS BLD VENIPUNCTURE: CPT

## 2019-12-05 PROCEDURE — 80061 LIPID PANEL: CPT

## 2019-12-05 PROCEDURE — 77080 DXA BONE DENSITY AXIAL: CPT

## 2019-12-05 PROCEDURE — 80053 COMPREHEN METABOLIC PANEL: CPT

## 2019-12-05 PROCEDURE — 84443 ASSAY THYROID STIM HORMONE: CPT

## 2021-03-23 ENCOUNTER — HOSPITAL ENCOUNTER (OUTPATIENT)
Age: 81
Discharge: HOME | End: 2021-03-23
Payer: MEDICARE

## 2021-03-23 DIAGNOSIS — E78.5 HYPERLIPIDEMIA, UNSPECIFIED HYPERLIPIDEMIA TYPE: ICD-10-CM

## 2021-03-23 DIAGNOSIS — R53.83 FATIGUE, UNSPECIFIED TYPE: ICD-10-CM

## 2021-03-23 DIAGNOSIS — I10 HYPERTENSION, UNSPECIFIED TYPE: ICD-10-CM

## 2021-03-23 LAB
ABSOLUTE EOS #: 0.6 K/UL (ref 0–0.4)
ABSOLUTE IMMATURE GRANULOCYTE: ABNORMAL K/UL (ref 0–0.3)
ABSOLUTE LYMPH #: 2 K/UL (ref 1–4.8)
ABSOLUTE MONO #: 1 K/UL (ref 0.1–1.3)
ALBUMIN SERPL-MCNC: 4.2 G/DL (ref 3.5–5.2)
ALBUMIN/GLOBULIN RATIO: ABNORMAL (ref 1–2.5)
ALP BLD-CCNC: 89 U/L (ref 35–104)
ALT SERPL-CCNC: 18 U/L (ref 5–33)
ANION GAP SERPL CALCULATED.3IONS-SCNC: 10 MMOL/L (ref 9–17)
AST SERPL-CCNC: 25 U/L
BASOPHILS # BLD: 1 % (ref 0–2)
BASOPHILS ABSOLUTE: 0 K/UL (ref 0–0.2)
BILIRUB SERPL-MCNC: 0.32 MG/DL (ref 0.3–1.2)
BUN BLDV-MCNC: 27 MG/DL (ref 8–23)
BUN/CREAT BLD: ABNORMAL (ref 9–20)
CALCIUM SERPL-MCNC: 9.7 MG/DL (ref 8.6–10.4)
CHLORIDE BLD-SCNC: 104 MMOL/L (ref 98–107)
CHOLESTEROL/HDL RATIO: 2.9
CHOLESTEROL: 161 MG/DL
CO2: 27 MMOL/L (ref 20–31)
CREAT SERPL-MCNC: 0.76 MG/DL (ref 0.5–0.9)
DIFFERENTIAL TYPE: ABNORMAL
EOSINOPHILS RELATIVE PERCENT: 8 % (ref 0–4)
GFR AFRICAN AMERICAN: >60 ML/MIN
GFR NON-AFRICAN AMERICAN: >60 ML/MIN
GFR SERPL CREATININE-BSD FRML MDRD: ABNORMAL ML/MIN/{1.73_M2}
GFR SERPL CREATININE-BSD FRML MDRD: ABNORMAL ML/MIN/{1.73_M2}
GLUCOSE BLD-MCNC: 131 MG/DL (ref 70–99)
HCT VFR BLD CALC: 42.8 % (ref 36–46)
HDLC SERPL-MCNC: 56 MG/DL
HEMOGLOBIN: 13.8 G/DL (ref 12–16)
IMMATURE GRANULOCYTES: ABNORMAL %
LDL CHOLESTEROL: 77 MG/DL (ref 0–130)
LYMPHOCYTES # BLD: 27 % (ref 24–44)
MCH RBC QN AUTO: 29.7 PG (ref 26–34)
MCHC RBC AUTO-ENTMCNC: 32.4 G/DL (ref 31–37)
MCV RBC AUTO: 91.9 FL (ref 80–100)
MONOCYTES # BLD: 14 % (ref 1–7)
NRBC AUTOMATED: ABNORMAL PER 100 WBC
PDW BLD-RTO: 13.1 % (ref 11.5–14.9)
PLATELET # BLD: 294 K/UL (ref 150–450)
PLATELET ESTIMATE: ABNORMAL
PMV BLD AUTO: 7.8 FL (ref 6–12)
POTASSIUM SERPL-SCNC: 4.9 MMOL/L (ref 3.7–5.3)
RBC # BLD: 4.66 M/UL (ref 4–5.2)
RBC # BLD: ABNORMAL 10*6/UL
SEG NEUTROPHILS: 50 % (ref 36–66)
SEGMENTED NEUTROPHILS ABSOLUTE COUNT: 3.7 K/UL (ref 1.3–9.1)
SODIUM BLD-SCNC: 141 MMOL/L (ref 135–144)
TOTAL PROTEIN: 8.2 G/DL (ref 6.4–8.3)
TRIGL SERPL-MCNC: 141 MG/DL
TSH SERPL DL<=0.05 MIU/L-ACNC: 3.63 MIU/L (ref 0.3–5)
VLDLC SERPL CALC-MCNC: NORMAL MG/DL (ref 1–30)
WBC # BLD: 7.4 K/UL (ref 3.5–11)
WBC # BLD: ABNORMAL 10*3/UL

## 2021-03-23 PROCEDURE — 36415 COLL VENOUS BLD VENIPUNCTURE: CPT

## 2021-03-23 PROCEDURE — 80053 COMPREHEN METABOLIC PANEL: CPT

## 2021-03-23 PROCEDURE — 80061 LIPID PANEL: CPT

## 2021-03-23 PROCEDURE — 84443 ASSAY THYROID STIM HORMONE: CPT

## 2021-03-23 PROCEDURE — 85025 COMPLETE CBC W/AUTO DIFF WBC: CPT

## 2021-03-25 DIAGNOSIS — M25.511 RIGHT SHOULDER PAIN, UNSPECIFIED CHRONICITY: Primary | ICD-10-CM

## 2021-03-26 ENCOUNTER — OFFICE VISIT (OUTPATIENT)
Dept: ORTHOPEDIC SURGERY | Age: 81
End: 2021-03-26
Payer: MEDICARE

## 2021-03-26 VITALS — TEMPERATURE: 96.8 F | WEIGHT: 250 LBS | BODY MASS INDEX: 46.01 KG/M2 | RESPIRATION RATE: 14 BRPM | HEIGHT: 62 IN

## 2021-03-26 DIAGNOSIS — M19.011 OSTEOARTHRITIS OF RIGHT GLENOHUMERAL JOINT: Primary | ICD-10-CM

## 2021-03-26 PROCEDURE — 1036F TOBACCO NON-USER: CPT | Performed by: ORTHOPAEDIC SURGERY

## 2021-03-26 PROCEDURE — 1090F PRES/ABSN URINE INCON ASSESS: CPT | Performed by: ORTHOPAEDIC SURGERY

## 2021-03-26 PROCEDURE — G8484 FLU IMMUNIZE NO ADMIN: HCPCS | Performed by: ORTHOPAEDIC SURGERY

## 2021-03-26 PROCEDURE — G8399 PT W/DXA RESULTS DOCUMENT: HCPCS | Performed by: ORTHOPAEDIC SURGERY

## 2021-03-26 PROCEDURE — G8427 DOCREV CUR MEDS BY ELIG CLIN: HCPCS | Performed by: ORTHOPAEDIC SURGERY

## 2021-03-26 PROCEDURE — 99203 OFFICE O/P NEW LOW 30 MIN: CPT | Performed by: ORTHOPAEDIC SURGERY

## 2021-03-26 PROCEDURE — G8417 CALC BMI ABV UP PARAM F/U: HCPCS | Performed by: ORTHOPAEDIC SURGERY

## 2021-03-26 PROCEDURE — 1123F ACP DISCUSS/DSCN MKR DOCD: CPT | Performed by: ORTHOPAEDIC SURGERY

## 2021-03-26 PROCEDURE — 4040F PNEUMOC VAC/ADMIN/RCVD: CPT | Performed by: ORTHOPAEDIC SURGERY

## 2021-03-26 RX ORDER — VITAMIN B COMPLEX
1 CAPSULE ORAL DAILY
COMMUNITY

## 2021-04-03 PROBLEM — M19.011 OSTEOARTHRITIS OF RIGHT GLENOHUMERAL JOINT: Status: ACTIVE | Noted: 2021-04-03

## 2021-04-03 NOTE — PROGRESS NOTES
Orthopedic Shoulder Encounter Note     Chief complaint: Right shoulder pain    HPI: Genia Hunter is a 79 y.o.  right-hand dominant female who presents for evaluation of her right shoulder. She has a history of apparently a right shoulder rotator cuff repair back in 2002 by Dr. Kaylee Manjarrez and indicates that ever since surgery she never really had full range of motion in her shoulder. She states that since 2003 she has been dealing with some recurrent pain in the anterior aspect of her shoulder and upper arm but since January of this year she has had persistent pain primarily in the posterior shoulder extending into her neck. She has a difficult time using this arm for simple activities of daily living as a result of increased pain. She also complains of significant pain even at rest.    Previous treatment:    NSAIDs: Ibuprofen    Physical Therapy: She states that she has had physical therapy several times through the years    Injections: No    Surgeries: Reported history of right shoulder rotator cuff repair in 2002 by Dr. Melinda Mars    Review of Systems:     Constitution: no fever or chills   Pain level: 10/10  Musculoskeletal: As noted in the HPI   Neurologic: no neurologic symptoms    Past Medical History  Delon Marsh  has a past medical history of Arthritis, Borderline diabetic, CAD (coronary artery disease), Constipation, GERD (gastroesophageal reflux disease), Hyperlipidemia, Hypertension, Incisional hernia, and Wears glasses. Past Surgical History  Delon Marsh  has a past surgical history that includes Knee arthroscopy (Left); Neck surgery (2001); Rotator cuff repair (Right); Cholecystectomy; Carpal tunnel release (Bilateral); Hysterectomy (1979); and ventral hernia repair (N/A, 4/5/2019).     Current Medications  Current Outpatient Medications   Medication Sig Dispense Refill    b complex vitamins capsule Take 1 capsule by mouth daily      Glucosamine-Chondroitin (GLUCOSAMINE CHONDR COMPLEX PO) Take 1 capsule by mouth daily      amLODIPine (NORVASC) 5 MG tablet TAKE 1 TABLET BY MOUTH EVERY DAY 90 tablet 0    simvastatin (ZOCOR) 40 MG tablet TAKE 1 TABLET BY MOUTH EVERY DAY EVERY NIGHT 90 tablet 0    lisinopril (PRINIVIL;ZESTRIL) 40 MG tablet TAKE 1/2 TABLET BY MOUTH DAILY 45 tablet 0    isosorbide mononitrate (IMDUR) 60 MG extended release tablet TAKE 1 TABLET BY MOUTH EVERY DAY 90 tablet 0    metoprolol tartrate (LOPRESSOR) 50 MG tablet TAKE 1 TABLET BY MOUTH TWICE A  tablet 0    ibuprofen (ADVIL;MOTRIN) 800 MG tablet TAKE 1 TABLET BY MOUTH EVERY 6 HOURS AS NEEDED FOR PAIN 120 tablet 0    esomeprazole Magnesium (NEXIUM) 20 MG PACK Take 20 mg by mouth daily      aspirin 325 MG tablet Take 162.5 mg by mouth daily Takes 1/2 tablet of aspirin       No current facility-administered medications for this visit. Allergies  Allergies have been reviewed. Radha Wolfe is allergic to medrol [methylprednisolone]. Social History  Radha Wolfe  reports that she has never smoked. She has never used smokeless tobacco. She reports that she does not drink alcohol or use drugs. Family History  Tracey's family history includes Diabetes in her mother; Heart Attack in her father; Heart Failure in her mother.      Physical Exam:     Temp 96.8 °F (36 °C) (Infrared)   Resp 14   Ht 5' 2\" (1.575 m)   Wt 250 lb (113.4 kg)   BMI 45.73 kg/m²    General Appearance: alert, well appearing, and in no distress  Mental Status: alert, oriented to person, place, and time  Gait: normal    Shoulder:    Skin: warm and dry, no rash or erythema; no swelling or obvious muscular atrophy  Vasculature: 2+ radial pulses bilaterally  Neuro: Sensation grossly intact to light touch diffusely  Tenderness: Tender to palpation over the anterior aspect of the right shoulder    ROM: (Degrees)    Right   A P   Left   A P    Elevation  75 90   Elevation  85   Abduction  70 100   Abduction  90    ER   0 15   ER   15   IR   L5    IR   L3   90 abd/ER      90 abd/ER     90 abd/IR      90 abd/IR     Crepitation  Yes    Crepitation No  Dyskenesia  No    Dyskenesia No      Muscle strength:    Right       Left    Deltoid   5    Deltoid   5  Supraspinatus  3    Supraspinatus  5  ER   5    ER   5  IR   5    IR   5    Special tests    Right   Rotator Cuff    Left    y   Painful arc    n   y   Pain with ER    n    y   Neer's     n    y   Hawkin's    n    n   Drop Arm    n  n   Lift off/Belly Press   n  n   ER Lag    n          TRISTAR [facility] Joint  n   AC tenderness   n  y   Cross-chest adduction  n       Labrum/biceps    y (equivocal)  Burleson's    n   y   Biceps sheer    n      y   Speed's/Yergason's   n    y   Tenderness Biceps Groove  n    n   Vadim's    n         Instability  n   Ant Apprehension   n    n   Post Apprehension   n    n   Ant Load shift    n    n   Post Load shift   n   n   Sulcus     n  n   Generalized Laxity   n  n   Relocation test   n  n   Crank test     n  n   Luis A-superior escape  n       Imaging:  Xrays: 4 views of the right shoulder obtained on 3/26/21 were independently reviewed  Indications: Right shoulder pain  Findings: Complete glenohumeral joint space loss with a central pattern of glenoid wear. There does appear to be some narrowing of the acromiohumeral interval and some mild osteophytic change on the lateral aspect of the greater tuberosity. Impression: Severe glenohumeral joint degeneration associated with a possible rotator cuff tear. Impression/Plan:     Zachary Beltran is a 79 y.o. old female with right shoulder pain due to severe glenohumeral joint osteoarthritis associate with a possible rotator cuff tear. I had a discussion with the patient today educating her about this problem and discussing treatment options available to her including nonoperative and operative intervention. I would recommend proceeding with conservative management at this time. We did discuss use of cortisone injections versus NSAIDs. She is currently on ibuprofen 800 mg.   With the potentially switch her to a different anti-inflammatory to see if this would be more effective or proceed with a cortisone injection. At this point she would like to think things over and will notify us as to how she would like to proceed. I will have her follow-up my clinic as needed but she was encouraged to return or call at anytime with questions and/or concerns.         NA = Not assessed  RTC = Rotator cuff  RCT = Rotator cuff tear  ER = External rotation  IR = Internal rotation  AC = Acromioclavicular  GH = Glenohumeral  n = No  y = Yes

## 2021-07-01 ENCOUNTER — OFFICE VISIT (OUTPATIENT)
Dept: GASTROENTEROLOGY | Age: 81
End: 2021-07-01
Payer: MEDICARE

## 2021-07-01 VITALS
WEIGHT: 250.8 LBS | OXYGEN SATURATION: 95 % | HEART RATE: 65 BPM | SYSTOLIC BLOOD PRESSURE: 155 MMHG | DIASTOLIC BLOOD PRESSURE: 73 MMHG | TEMPERATURE: 98.1 F | BODY MASS INDEX: 45.87 KG/M2

## 2021-07-01 DIAGNOSIS — Z12.11 SCREEN FOR COLON CANCER: ICD-10-CM

## 2021-07-01 DIAGNOSIS — K59.00 CONSTIPATION, UNSPECIFIED CONSTIPATION TYPE: Primary | ICD-10-CM

## 2021-07-01 PROCEDURE — 1090F PRES/ABSN URINE INCON ASSESS: CPT | Performed by: INTERNAL MEDICINE

## 2021-07-01 PROCEDURE — G8427 DOCREV CUR MEDS BY ELIG CLIN: HCPCS | Performed by: INTERNAL MEDICINE

## 2021-07-01 PROCEDURE — 1123F ACP DISCUSS/DSCN MKR DOCD: CPT | Performed by: INTERNAL MEDICINE

## 2021-07-01 PROCEDURE — G8399 PT W/DXA RESULTS DOCUMENT: HCPCS | Performed by: INTERNAL MEDICINE

## 2021-07-01 PROCEDURE — 1036F TOBACCO NON-USER: CPT | Performed by: INTERNAL MEDICINE

## 2021-07-01 PROCEDURE — 4040F PNEUMOC VAC/ADMIN/RCVD: CPT | Performed by: INTERNAL MEDICINE

## 2021-07-01 PROCEDURE — 99204 OFFICE O/P NEW MOD 45 MIN: CPT | Performed by: INTERNAL MEDICINE

## 2021-07-01 PROCEDURE — G8417 CALC BMI ABV UP PARAM F/U: HCPCS | Performed by: INTERNAL MEDICINE

## 2021-07-01 ASSESSMENT — ENCOUNTER SYMPTOMS
CHOKING: 0
VOICE CHANGE: 0
RECTAL PAIN: 0
ABDOMINAL PAIN: 1
SHORTNESS OF BREATH: 0
COUGH: 0
ANAL BLEEDING: 1
TROUBLE SWALLOWING: 0
SORE THROAT: 0
NAUSEA: 1
BACK PAIN: 1
VOMITING: 0
ABDOMINAL DISTENTION: 0
CONSTIPATION: 1
BLOOD IN STOOL: 1
DIARRHEA: 1

## 2021-07-01 NOTE — PROGRESS NOTES
Reason for Referral:   MD Chelsie Overton [address] 1996    Chief Complaint   Patient presents with    New Patient     Patient is here today as a new patient    Constipation     Patient is here today due to alternating constipation and diarrhea       1. Constipation, unspecified constipation type    2. Screen for colon cancer            HISTORY OF PRESENT ILLNESS: Yisel Gupta is a 79 y.o. female with a past history remarkable for , referred for evaluation of   Chief Complaint   Patient presents with    New Patient     Patient is here today as a new patient    Constipation     Patient is here today due to alternating constipation and diarrhea   . Patient seen with the symptom of constipation. This 58-year-old patient is having significant constipation. She has been taking stool softeners and other laxatives with minimal relief. She strains at bowel movements. She has severe tenesmus. She also states that in the past she had mild to moderate constipation for several years. When she takes excessive laxatives she gets diarrhea. No obvious hematochezia. She is not clear whether she has abdominal distention, bloating, cramps etc.    She denies taking narcotics. No other etiology for her constipation. She never had colon examination in the past.    Denies dysphagia dyspepsia. Fair appetite and there is no weight loss. No prior history of liver disease. Denies chest pain, shortness of breath, palpitations etc.    Recently she had TSH, CMP, CBC done appears to be nonspecific. In March 2019 she had a CT scan done revealed diverticulosis and a fat-containing hernia for which she had surgery done. Also in the past she had a cholecystectomy and a hysterectomy done. Denies family history of GI malignancies. Past Medical,Family, and Social History reviewed and does contribute to the patient presentingcondition.     Patient's PMH/PSH,SH,PSYCH Hx, MEDs, ALLERGIES, and ROS were all reviewed and updated in the appropriate sections. PAST MEDICAL HISTORY:  Past Medical History:   Diagnosis Date    Arthritis     Borderline diabetic     controlled with diet    CAD (coronary artery disease)     Constipation     GERD (gastroesophageal reflux disease)     Hyperlipidemia     Hypertension     Incisional hernia     Wears glasses        Past Surgical History:   Procedure Laterality Date    CARPAL TUNNEL RELEASE Bilateral     2 times on the right and 2 times on the left    CHOLECYSTECTOMY      HYSTERECTOMY  1979    \"they took part of the uterus.   I still have periods but they were every other month\", doesn't know why    KNEE ARTHROSCOPY Left     NECK SURGERY  2001    titanium screw in neck by [facility] N/A 4/5/2019    OPEN HERNIA INCISIONAL REPAIR W/MESH performed by Darrius Godoy MD at [address]:    Current Outpatient Medications:     ibuprofen (ADVIL;MOTRIN) 800 MG tablet, TAKE 1 TABLET BY MOUTH EVERY 6 HOURS AS NEEDED FOR PAIN, Disp: 120 tablet, Rfl: 0    polyethylene glycol (GLYCOLAX) 17 GM/SCOOP powder, Take 17 g by mouth daily, Disp: 1530 g, Rfl: 1    amLODIPine (NORVASC) 5 MG tablet, TAKE 1 TABLET BY MOUTH EVERY DAY, Disp: 90 tablet, Rfl: 0    metoprolol tartrate (LOPRESSOR) 50 MG tablet, TAKE 1 TABLET BY MOUTH TWICE A DAY, Disp: 180 tablet, Rfl: 0    isosorbide mononitrate (IMDUR) 60 MG extended release tablet, TAKE 1 TABLET BY MOUTH EVERY DAY, Disp: 90 tablet, Rfl: 0    b complex vitamins capsule, Take 1 capsule by mouth daily, Disp: , Rfl:     Glucosamine-Chondroitin (GLUCOSAMINE CHONDR COMPLEX PO), Take 1 capsule by mouth daily, Disp: , Rfl:     simvastatin (ZOCOR) 40 MG tablet, TAKE 1 TABLET BY MOUTH EVERY DAY EVERY NIGHT, Disp: 90 tablet, Rfl: 0    lisinopril (PRINIVIL;ZESTRIL) 40 MG tablet, TAKE 1/2 TABLET BY MOUTH DAILY, Disp: 45 tablet, Rfl: 0   esomeprazole Magnesium (NEXIUM) 20 MG PACK, Take 20 mg by mouth daily, Disp: , Rfl:     aspirin 325 MG tablet, Take 162.5 mg by mouth daily Takes 1/2 tablet of aspirin, Disp: , Rfl:     ALLERGIES:   Allergies   Allergen Reactions    Medrol [Methylprednisolone] Other (See Comments)     Unknown reaction, \"I don't remember. It just made me feel funny. \"       FAMILY HISTORY:       Problem Relation Age of Onset    Diabetes Mother     Heart Failure Mother     Heart Attack Father          SOCIAL HISTORY:   Social History     Socioeconomic History    Marital status:      Spouse name: Not on file    Number of children: 11    Years of education: Not on file    Highest education level: 12th grade   Occupational History    Not on file   Tobacco Use    Smoking status: Never Smoker    Smokeless tobacco: Never Used   Vaping Use    Vaping Use: Never used   Substance and Sexual Activity    Alcohol use: No     Alcohol/week: 0.0 standard drinks    Drug use: Never    Sexual activity: Not Currently   Other Topics Concern    Not on file   Social History Narrative    Not on file     Social Determinants of Health     Financial Resource Strain:     Difficulty of Paying Living Expenses:    Food Insecurity: No Food Insecurity    Worried About Running Out of Food in the Last Year: Never true    Chelsea of Food in the Last Year: Never true   Transportation Needs: No Transportation Needs    Lack of Transportation (Medical): No    Lack of Transportation (Non-Medical): No   Physical Activity: Sufficiently Active    Days of Exercise per Week: 6 days    Minutes of Exercise per Session: 30 min   Stress: No Stress Concern Present    Feeling of Stress : Not at all   Social Connections:  Moderately Integrated    Frequency of Communication with Friends and Family: More than three times a week    Frequency of Social Gatherings with Friends and Family: Once a week    Attends [employer] Services: 1 to 4 times per year    03/23/2021    ALT 18 03/23/2021         Lab Results   Component Value Date    RBC 4.66 03/23/2021    HGB 13.8 03/23/2021    MCV 91.9 03/23/2021    MCH 29.7 03/23/2021    MCHC 32.4 03/23/2021    RDW 13.1 03/23/2021    MPV 7.8 03/23/2021    BASOPCT 1 03/23/2021    LYMPHSABS 2.00 03/23/2021    MONOSABS 1.00 03/23/2021    NEUTROABS 3.70 03/23/2021    EOSABS 0.60 (H) 03/23/2021    BASOSABS 0.00 03/23/2021         DIAGNOSTIC TESTING:     No results found. IMPRESSION: Ms. Arpit Estrada is a 79 y.o. female with     Assessment:  1. Constipation, unspecified constipation type    2. Screen for colon cancer        Plan:  Patient is moderately overweight. Abdominal examination benign. Her stool is negative for occult blood. No rectal impaction noted. No anal stricture. Discussed with the patient regarding differential diagnosis of constipation further work-up. Given that she never had colon examination in the past, she may need this for screening and also to evaluate constipation. Discussed with the patient regarding medical management of constipation and brochures given. Advised fiber supplements and intermittent laxatives. Basing on the results of colonoscopy and the improvement with the medical therapy will plan further management. Spent 30 minutes providing patient education and counseling. Thank you for allowing me to participate in the care of Ms. Arpit Estrada. For any further questions please do not hesitate to contact me. Note is dictated utilizing voice recognition software. Unfortunately this leads to occasional typographical errors. Please contact our office if you have any questions. I have reviewed and agree with the MA/LPN ROS.      Nedra Tipton MD, [facility]  Board Certified in Gastroenterology and [facility]  Office #: [phone]

## 2021-07-07 ENCOUNTER — HOSPITAL ENCOUNTER (OUTPATIENT)
Dept: PREADMISSION TESTING | Age: 81
Discharge: HOME | End: 2021-07-11
Payer: MEDICARE

## 2021-07-07 VITALS — BODY MASS INDEX: 46.01 KG/M2 | WEIGHT: 250 LBS | HEIGHT: 62 IN

## 2021-07-07 NOTE — PROGRESS NOTES
will go to the short stay unit for preparation to be discharged. Only your one designated person is allowed to come to short stay for your discharge. Above instructions reviewed with patient via telephone PAT call. Patient verbalizes understanding.

## 2021-07-12 RX ORDER — POLYETHYLENE GLYCOL 3350 17 G/17G
POWDER, FOR SOLUTION ORAL
Qty: 238 G | Refills: 0 | Status: ON HOLD | OUTPATIENT
Start: 2021-07-12 | End: 2021-07-20 | Stop reason: ALTCHOICE

## 2021-07-14 NOTE — TELEPHONE ENCOUNTER
Patient called and states she has miss placed her prep sheet and would like you to email it to her @  [email or web address] .  Thank You

## 2021-07-19 ENCOUNTER — ANESTHESIA EVENT (OUTPATIENT)
Dept: ENDOSCOPY | Age: 81
End: 2021-07-19
Payer: MEDICARE

## 2021-07-20 ENCOUNTER — HOSPITAL ENCOUNTER (OUTPATIENT)
Age: 81
Setting detail: OUTPATIENT SURGERY
Discharge: HOME | End: 2021-07-20
Attending: INTERNAL MEDICINE | Admitting: INTERNAL MEDICINE
Payer: MEDICARE

## 2021-07-20 ENCOUNTER — ANESTHESIA (OUTPATIENT)
Dept: ENDOSCOPY | Age: 81
End: 2021-07-20
Payer: MEDICARE

## 2021-07-20 VITALS
SYSTOLIC BLOOD PRESSURE: 131 MMHG | BODY MASS INDEX: 45.08 KG/M2 | TEMPERATURE: 98.2 F | OXYGEN SATURATION: 97 % | WEIGHT: 245 LBS | HEART RATE: 80 BPM | RESPIRATION RATE: 17 BRPM | HEIGHT: 62 IN | DIASTOLIC BLOOD PRESSURE: 45 MMHG

## 2021-07-20 VITALS — OXYGEN SATURATION: 95 % | DIASTOLIC BLOOD PRESSURE: 40 MMHG | SYSTOLIC BLOOD PRESSURE: 117 MMHG

## 2021-07-20 PROCEDURE — G0121 COLON CA SCRN NOT HI RSK IND: HCPCS | Performed by: INTERNAL MEDICINE

## 2021-07-20 PROCEDURE — 6360000002 HC RX W HCPCS: Performed by: NURSE ANESTHETIST, CERTIFIED REGISTERED

## 2021-07-20 PROCEDURE — 2500000003 HC RX 250 WO HCPCS: Performed by: NURSE ANESTHETIST, CERTIFIED REGISTERED

## 2021-07-20 PROCEDURE — 7100000000 HC PACU RECOVERY - FIRST 15 MIN: Performed by: INTERNAL MEDICINE

## 2021-07-20 PROCEDURE — 2580000003 HC RX 258: Performed by: ANESTHESIOLOGY

## 2021-07-20 PROCEDURE — 3609027000 HC COLONOSCOPY: Performed by: INTERNAL MEDICINE

## 2021-07-20 PROCEDURE — 7100000001 HC PACU RECOVERY - ADDTL 15 MIN: Performed by: INTERNAL MEDICINE

## 2021-07-20 PROCEDURE — 2709999900 HC NON-CHARGEABLE SUPPLY: Performed by: INTERNAL MEDICINE

## 2021-07-20 PROCEDURE — 3700000000 HC ANESTHESIA ATTENDED CARE: Performed by: INTERNAL MEDICINE

## 2021-07-20 RX ORDER — PROPOFOL 10 MG/ML
INJECTION, EMULSION INTRAVENOUS PRN
Status: DISCONTINUED | OUTPATIENT
Start: 2021-07-20 | End: 2021-07-20 | Stop reason: SDUPTHER

## 2021-07-20 RX ORDER — DIPHENHYDRAMINE HYDROCHLORIDE 50 MG/ML
12.5 INJECTION INTRAMUSCULAR; INTRAVENOUS
Status: DISCONTINUED | OUTPATIENT
Start: 2021-07-20 | End: 2021-07-20 | Stop reason: HOSPADM

## 2021-07-20 RX ORDER — OXYCODONE HYDROCHLORIDE AND ACETAMINOPHEN 5; 325 MG/1; MG/1
2 TABLET ORAL PRN
Status: DISCONTINUED | OUTPATIENT
Start: 2021-07-20 | End: 2021-07-20 | Stop reason: HOSPADM

## 2021-07-20 RX ORDER — HYDRALAZINE HYDROCHLORIDE 20 MG/ML
INJECTION INTRAMUSCULAR; INTRAVENOUS PRN
Status: DISCONTINUED | OUTPATIENT
Start: 2021-07-20 | End: 2021-07-20 | Stop reason: SDUPTHER

## 2021-07-20 RX ORDER — LIDOCAINE HYDROCHLORIDE 10 MG/ML
1 INJECTION, SOLUTION EPIDURAL; INFILTRATION; INTRACAUDAL; PERINEURAL
Status: DISCONTINUED | OUTPATIENT
Start: 2021-07-20 | End: 2021-07-20 | Stop reason: HOSPADM

## 2021-07-20 RX ORDER — LABETALOL HYDROCHLORIDE 5 MG/ML
5 INJECTION, SOLUTION INTRAVENOUS EVERY 10 MIN PRN
Status: DISCONTINUED | OUTPATIENT
Start: 2021-07-20 | End: 2021-07-20 | Stop reason: HOSPADM

## 2021-07-20 RX ORDER — PROPOFOL 10 MG/ML
INJECTION, EMULSION INTRAVENOUS CONTINUOUS PRN
Status: DISCONTINUED | OUTPATIENT
Start: 2021-07-20 | End: 2021-07-20 | Stop reason: SDUPTHER

## 2021-07-20 RX ORDER — LIDOCAINE HYDROCHLORIDE 10 MG/ML
INJECTION, SOLUTION EPIDURAL; INFILTRATION; INTRACAUDAL; PERINEURAL PRN
Status: DISCONTINUED | OUTPATIENT
Start: 2021-07-20 | End: 2021-07-20 | Stop reason: SDUPTHER

## 2021-07-20 RX ORDER — SODIUM CHLORIDE, SODIUM LACTATE, POTASSIUM CHLORIDE, CALCIUM CHLORIDE 600; 310; 30; 20 MG/100ML; MG/100ML; MG/100ML; MG/100ML
INJECTION, SOLUTION INTRAVENOUS CONTINUOUS
Status: DISCONTINUED | OUTPATIENT
Start: 2021-07-20 | End: 2021-07-20 | Stop reason: HOSPADM

## 2021-07-20 RX ORDER — OXYCODONE HYDROCHLORIDE AND ACETAMINOPHEN 5; 325 MG/1; MG/1
1 TABLET ORAL PRN
Status: DISCONTINUED | OUTPATIENT
Start: 2021-07-20 | End: 2021-07-20 | Stop reason: HOSPADM

## 2021-07-20 RX ORDER — ONDANSETRON 2 MG/ML
4 INJECTION INTRAMUSCULAR; INTRAVENOUS
Status: DISCONTINUED | OUTPATIENT
Start: 2021-07-20 | End: 2021-07-20 | Stop reason: HOSPADM

## 2021-07-20 RX ADMIN — SODIUM CHLORIDE, POTASSIUM CHLORIDE, SODIUM LACTATE AND CALCIUM CHLORIDE: 600; 310; 30; 20 INJECTION, SOLUTION INTRAVENOUS at 06:36

## 2021-07-20 RX ADMIN — LIDOCAINE HYDROCHLORIDE 50 MG: 10 INJECTION, SOLUTION EPIDURAL; INFILTRATION; INTRACAUDAL; PERINEURAL at 07:37

## 2021-07-20 RX ADMIN — PROPOFOL 30 MG: 10 INJECTION, EMULSION INTRAVENOUS at 07:43

## 2021-07-20 RX ADMIN — PROPOFOL 70 MG: 10 INJECTION, EMULSION INTRAVENOUS at 07:37

## 2021-07-20 RX ADMIN — HYDRALAZINE HYDROCHLORIDE 10 MG: 20 INJECTION, SOLUTION INTRAMUSCULAR; INTRAVENOUS at 07:47

## 2021-07-20 RX ADMIN — PROPOFOL 100 MCG/KG/MIN: 10 INJECTION, EMULSION INTRAVENOUS at 07:37

## 2021-07-20 ASSESSMENT — PULMONARY FUNCTION TESTS
PIF_VALUE: 0
PIF_VALUE: 1
PIF_VALUE: 0

## 2021-07-20 ASSESSMENT — PAIN - FUNCTIONAL ASSESSMENT: PAIN_FUNCTIONAL_ASSESSMENT: 0-10

## 2021-07-20 ASSESSMENT — ENCOUNTER SYMPTOMS
TROUBLE SWALLOWING: 0
RHINORRHEA: 0
SHORTNESS OF BREATH: 1
SORE THROAT: 0
SHORTNESS OF BREATH: 1
WHEEZING: 0
COUGH: 0

## 2021-07-20 ASSESSMENT — PAIN SCALES - GENERAL
PAINLEVEL_OUTOF10: 0

## 2021-07-20 ASSESSMENT — PAIN DESCRIPTION - DESCRIPTORS: DESCRIPTORS: ACHING

## 2021-07-20 NOTE — ANESTHESIA PRE PROCEDURE
Department of Anesthesiology  Preprocedure Note       Name:  Debbie Decker   Age:  79 y.o.  :  1940                                          MRN:  [id number]         Date:  2021      Surgeon: Lita Recinos):  Britany Worrell MD    Procedure: Procedure(s):  COLONOSCOPY DIAGNOSTIC    Medications prior to admission:   Prior to Admission medications    Medication Sig Start Date End Date Taking?  Authorizing Provider   magnesium hydroxide (MILK OF MAGNESIA) 400 MG/5ML suspension Please follow instructions provided by physicians office 21  Yes Britany Worrell MD   polyethylene glycol (GLYCOLAX) 17 GM/SCOOP powder Take 17 g by mouth daily 21 Yes Ranjeet Gutierrez MD   amLODIPine (NORVASC) 5 MG tablet TAKE 1 TABLET BY MOUTH EVERY DAY 21  Yes Ranjeet Gutierrez MD   isosorbide mononitrate (IMDUR) 60 MG extended release tablet TAKE 1 TABLET BY MOUTH EVERY DAY 5/3/21  Yes Ranjeet Gutierrez MD   simvastatin (ZOCOR) 40 MG tablet TAKE 1 TABLET BY MOUTH EVERY DAY EVERY NIGHT 21  Yes Ranjeet Gutierrez MD   lisinopril (PRINIVIL;ZESTRIL) 40 MG tablet TAKE 1/2 TABLET BY MOUTH DAILY  Patient taking differently: Take 20 mg by mouth nightly TAKE 1/2 TABLET BY MOUTH DAILY 2/10/21  Yes Ranjeet Gutierrez MD   esomeprazole Magnesium (NEXIUM) 20 MG PACK Take 20 mg by mouth daily   Yes Historical Provider, MD   Multiple Vitamins-Minerals (HAIR SKIN AND NAILS FORMULA PO) Take 2 tablets by mouth daily    Historical Provider, MD   ibuprofen (ADVIL;MOTRIN) 800 MG tablet TAKE 1 TABLET BY MOUTH EVERY 6 HOURS AS NEEDED FOR PAIN 21   Ranjeet Gutierrez MD   metoprolol tartrate (LOPRESSOR) 50 MG tablet TAKE 1 TABLET BY MOUTH TWICE A DAY 21   Ranjeet Gutierrez MD   b complex vitamins capsule Take 1 capsule by mouth daily    Historical Provider, MD   Glucosamine-Chondroitin (GLUCOSAMINE CHONDR COMPLEX PO) Take 2 capsules by mouth daily     Historical Provider, MD   aspirin 325 MG tablet Take 162.5 mg by mouth daily Takes 1/2 tablet of aspirin    Historical Provider, MD       Current medications:    Current Facility-Administered Medications   Medication Dose Route Frequency Provider Last Rate Last Admin    lactated ringers infusion   Intravenous Continuous Celia Cramer  mL/hr at 07/20/21 0636 New Bag at 07/20/21 0636    lidocaine PF 1 % injection 1 mL  1 mL Intradermal Once PRN Celia Cramer MD           Allergies: Allergies   Allergen Reactions    Medrol [Methylprednisolone] Other (See Comments)     Unknown reaction, \"I don't remember. It just made me feel funny. \"       Problem List:    Patient Active Problem List   Diagnosis Code    CAD (coronary artery disease) I25.10    Acid reflux K21.9    HTN (hypertension) I10    Hypercholesteremia E78.00    Osteoarthritis of right glenohumeral joint M19.011       Past Medical History:        Diagnosis Date    Arthritis     Borderline diabetic     controlled with diet    CAD (coronary artery disease)     Constipation     GERD (gastroesophageal reflux disease)     Hyperlipidemia     Hypertension     Incisional hernia     Irregular heart beat     Noted 7-20-21    Prolonged emergence from general anesthesia     Seasonal allergies     Short of breath on exertion     Wears glasses        Past Surgical History:        Procedure Laterality Date    CARPAL TUNNEL RELEASE Bilateral     2 times on the right and 2 times on the left    CHOLECYSTECTOMY      EYE SURGERY Right     [facility]   [facility]    \"they took part of the uterus.   I still have periods but they were every other month\", doesn't know why- no longer having menses as of 7-20-21    KNEE ARTHROSCOPY Left     NECK SURGERY  2001    titanium screw in neck by Dr. Suellen Rivas N/A 04/05/2019    OPEN HERNIA [facility] History:    Social History     Tobacco Use    Smoking status: Never Smoker    Smokeless tobacco: Never Used   Substance Use Topics    Alcohol use: No     Alcohol/week: 0.0 standard drinks                                Counseling given: Not Answered      Vital Signs (Current):   Vitals:    07/20/21 0617   BP: (!) 145/58   Pulse: 79   Resp: 20   Temp: 97.1 °F (36.2 °C)   TempSrc: Infrared   SpO2: 96%   Weight: 245 lb (111.1 kg)   Height: 5' 2\" (1.575 m)                                              BP Readings from Last 3 Encounters:   07/20/21 (!) 145/58   07/01/21 (!) 155/73   06/25/21 (!) 147/72       NPO Status: Time of last liquid consumption: 2330                        Time of last solid consumption: 1900                        Date of last liquid consumption: 07/19/21                        Date of last solid food consumption: 07/18/21    BMI:   Wt Readings from Last 3 Encounters:   07/20/21 245 lb (111.1 kg)   07/07/21 250 lb (113.4 kg)   07/01/21 250 lb 12.8 oz (113.8 kg)     Body mass index is 44.81 kg/m². CBC:   Lab Results   Component Value Date    WBC 7.4 03/23/2021    RBC 4.66 03/23/2021    HGB 13.8 03/23/2021    HCT 42.8 03/23/2021    MCV 91.9 03/23/2021    RDW 13.1 03/23/2021     03/23/2021       CMP:   Lab Results   Component Value Date     03/23/2021    K 4.9 03/23/2021     03/23/2021    CO2 27 03/23/2021    BUN 27 03/23/2021    CREATININE 0.76 03/23/2021    GFRAA >60 03/23/2021    LABGLOM >60 03/23/2021    GLUCOSE 131 03/23/2021    PROT 8.2 03/23/2021    CALCIUM 9.7 03/23/2021    BILITOT 0.32 03/23/2021    ALKPHOS 89 03/23/2021    AST 25 03/23/2021    ALT 18 03/23/2021       POC Tests: No results for input(s): POCGLU, POCNA, POCK, POCCL, POCBUN, POCHEMO, POCHCT in the last 72 hours.     Coags: No results found for: PROTIME, INR, APTT    HCG (If Applicable): No results found for: PREGTESTUR, PREGSERUM, HCG, HCGQUANT     ABGs: No results found for: PHART, PO2ART, OTC0XTZ, JZJ0LKJ, BEART, M1VAXFSQ     Type & Screen (If Applicable):  No results found for: LABABO, LABRH    Drug/Infectious Status (If Applicable):  No results found for: HIV, HEPCAB    COVID-19 Screening (If Applicable): No results found for: COVID19        Anesthesia Evaluation  Patient summary reviewed and Nursing notes reviewed history of anesthetic complications (Prolonged emergence from general anesthesia): Airway: Mallampati: III  TM distance: >3 FB   Neck ROM: full  Mouth opening: > = 3 FB Dental: normal exam         Pulmonary:normal exam  breath sounds clear to auscultation  (+) shortness of breath:                             Cardiovascular:    (+) hypertension:, CAD:, dysrhythmias:, ALBARRAN:, hyperlipidemia        Rhythm: regular  Rate: normal                    Neuro/Psych:   Negative Neuro/Psych ROS              GI/Hepatic/Renal:   (+) GERD:, morbid obesity          Endo/Other:    (+) Diabetes (Borderline diabetic), : arthritis: OA., .                 Abdominal:             Vascular: negative vascular ROS. Other Findings:             Anesthesia Plan      general     ASA 3       Induction: intravenous. MIPS: Prophylactic antiemetics administered. Anesthetic plan and risks discussed with patient. Plan discussed with CRNA.                   Anil Montero MD   7/20/2021

## 2021-07-20 NOTE — H&P
HISTORY and [facility] [id number]       NAME:  Debbie Decker  MRN: [id number]   YOB: 1940   Date: 7/20/2021   Age: 79 y.o. Gender: female     COMPLAINT AND PRESENT HISTORY:   Debbie Decker is 79 y.o.,  female, undergoing COLONOSCOPY DIAGNOSTIC for CONSTIPATION. COLONOSCOPY QUESTIONNAIRE  LAST COLONOSCOPY: No prior. HISTORY OF COLON POLYPS: N/A. S/S START DATE: States she has had constipation \"all her life\", but has worsened over 4-5 months- states she has at least 3 BM's per/week, if not constipated, has BM daily. ABD PAIN: LLQ.  CONSTIPATION: See above. DIARRHEA (ASIDE FROM COLONOSCOPY PREP): She does note diarrhea alternating w/constipation, possibly d/t tx r/t constipation. BLOOD IN STOOL/BLACK, TARRY STOOLS: Denies. NAUSEA/VOMITING/HEMATEMESIS: Denies. FEVER/CHILLS: Denies. APPETITE CHANGE: Lack of appetite. RECENT UNINTENDED WEIGHT LOSS: Denies. DIFFICULTY SWALLOWING/PHARYNGITIS/VOICE CHANGE: Denies. ADDITIONAL GI HX: GERD, incisional hernia. GI MEDICATIONS: Nexium. Has tried orange juice, fruits to help with constipation- denies trying OTC medication. States she recently tried Metamucil, which was helpful. Review of additional significant medical hx:  HTN, HLD, CAD: Denies current chest pain, palpitations, + SOB W/EXERTION (states at baseline/chronic), dizziness, leg swelling, + headache, but has improved since waking up this morning- denies being the worst h/a ever- rating pain a 1-2 on 0-10 pain scale. Current medications r/t condition: AMLODIPINE, METOPROLOL, IMDUR, SIMVASTATIN, LISINOPRIL  ECHO, 2-28-15:  CONCLUSIONS     Summary  Normal left ventricle size and function with an estimated normal EF > 55%. No segmental wall motion abnormalities seen. Mild left ventricular hypertrophy. Normal right ventricular size and function. Normal atria sizes. Normal aortic valve structure and function without stenosis or  regurgitation.   Mitral valve sclerosis without stenosis. Trivial mitral regurgitation. Normal tricuspid valve structure and function. Trivial tricuspid regurgitation. Pulmonic valve not well visualized. Mild pulmonic insufficiency. No significant pericardial effusion is seen. Normal aortic root dimension. Technically difficult study.     Signature  ----------------------------------------------------------------------------   Electronically signed by Ping Mcgee(Sonographer) on 03/02/2015 11:29 AM    BORDERLINE DIABETIC: Not on medication for issue- last she checked was 110- last week. Lab Results   Component Value Date    LABA1C 5.9 11/03/2015     Lab Results   Component Value Date     11/03/2015     SOB ON EXERTION: States this has been an issue \"for a long time\". Not on medications for issue. NPO status: Patient states they have been NPO since before midnight. Medications taken TODAY (with sip of water): None- will take after procedure. Anticoagulation status: Was on daily 325 mg 1/2 tablet ASA- held x7 days. Prep fully completed: YES. Pertinent family hx: Denies family hx of esophageal and colon CA. Denies personal hx of blood clots. Denies personal hx of MRSA infection. Denies any personal or family hx of previous complications w/anesthesia EXCEPT PATIENT DOES ADMIT TO PROLONGED EMERGENCE FROM ANESTHESIA. States she was questioned if she had sleep apnea in the past, which she does deny. Nicotine status: Non.   PAST MEDICAL HISTORY     Past Medical History:   Diagnosis Date    Arthritis     Borderline diabetic     controlled with diet    CAD (coronary artery disease)     Constipation     GERD (gastroesophageal reflux disease)     Hyperlipidemia     Hypertension     Incisional hernia     Irregular heart beat     Noted 7-20-21    Prolonged emergence from general anesthesia     Seasonal allergies     Short of breath on exertion     Wears glasses        SURGICAL HISTORY       Past Surgical History: Procedure Laterality Date    CARPAL TUNNEL RELEASE Bilateral     2 times on the right and 2 times on the left    CHOLECYSTECTOMY      EYE SURGERY Right     Procedure done to right eye   Geribenigno    \"they took part of the uterus. I still have periods but they were every other month\", doesn't know why- no longer having menses as of 7-20-21    KNEE ARTHROSCOPY Left     NECK SURGERY  2001    titanium screw in neck by Dr. Betty Thrasher N/A 04/05/2019    OPEN HERNIA INCISIONAL REPAIR W/MESH performed by Goran Moody MD at [place] [id number] History     Socioeconomic History    Marital status:      Spouse name: None    Number of children: 5    Years of education: None    Highest education level: 12th grade   Occupational History    None   Tobacco Use    Smoking status: Never Smoker    Smokeless tobacco: Never Used   Vaping Use    Vaping Use: Never used   Substance and Sexual Activity    Alcohol use: No     Alcohol/week: 0.0 standard drinks    Drug use: Never    Sexual activity: Not Currently   Other Topics Concern    None   Social History Narrative    None     Social Determinants of Health     Financial Resource Strain:     Difficulty of Paying Living Expenses:    Food Insecurity: No Food Insecurity    Worried About Running Out of Food in the Last Year: Never true    [place] of Food in the Last Year: Never true   Transportation Needs: No Transportation Needs    Lack of Transportation (Medical): No    Lack of Transportation (Non-Medical): No   Physical Activity: Sufficiently Active    Days of Exercise per Week: 6 days    Minutes of Exercise per Session: 30 min   Stress: No Stress Concern Present    Feeling of Stress : Not at all   Social Connections:  Moderately Integrated    Frequency of Communication with Friends and Family: More than three times a week    Frequency of Social Gatherings with Friends and Family: Once a week    Attends Jainism Services: 1 to 4 times per year    Active Member of [employer] Group or Organizations: Yes    Attends Club or Organization Meetings: 1 to 4 times per year    Marital Status:    Intimate Partner Violence: Not At Risk    Fear of Current or Ex-Partner: No    Emotionally Abused: No    Physically Abused: No    Sexually Abused: No       REVIEW OF SYSTEMS      Allergies   Allergen Reactions    Medrol [Methylprednisolone] Other (See Comments)     Unknown reaction, \"I don't remember. It just made me feel funny. \"       No current facility-administered medications on file prior to encounter.      Current Outpatient Medications on File Prior to Encounter   Medication Sig Dispense Refill    magnesium hydroxide (MILK OF MAGNESIA) 400 MG/5ML suspension Please follow instructions provided by physicians office 1 Bottle 0    polyethylene glycol (GLYCOLAX) 17 GM/SCOOP powder Take 17 g by mouth daily 1530 g 1    amLODIPine (NORVASC) 5 MG tablet TAKE 1 TABLET BY MOUTH EVERY DAY 90 tablet 0    isosorbide mononitrate (IMDUR) 60 MG extended release tablet TAKE 1 TABLET BY MOUTH EVERY DAY 90 tablet 0    simvastatin (ZOCOR) 40 MG tablet TAKE 1 TABLET BY MOUTH EVERY DAY EVERY NIGHT 90 tablet 0    lisinopril (PRINIVIL;ZESTRIL) 40 MG tablet TAKE 1/2 TABLET BY MOUTH DAILY (Patient taking differently: Take 20 mg by mouth nightly TAKE 1/2 TABLET BY MOUTH DAILY) 45 tablet 0    esomeprazole Magnesium (NEXIUM) 20 MG PACK Take 20 mg by mouth daily      ibuprofen (ADVIL;MOTRIN) 800 MG tablet TAKE 1 TABLET BY MOUTH EVERY 6 HOURS AS NEEDED FOR PAIN 120 tablet 0    metoprolol tartrate (LOPRESSOR) 50 MG tablet TAKE 1 TABLET BY MOUTH TWICE A  tablet 0    b complex vitamins capsule Take 1 capsule by mouth daily      Glucosamine-Chondroitin (GLUCOSAMINE CHONDR COMPLEX PO) Take 2 capsules by mouth daily       aspirin 325 MG tablet Take 162.5 mg by mouth daily Takes 1/2 tablet of aspirin       (Notation: Medications listed above are not currently reconciled at the signing of this H&P note, to be reconciled in pre-op per RN)    Review of Systems   Constitutional: Positive for appetite change (Lack of). Negative for chills and fever. HENT: Negative for congestion, ear pain, rhinorrhea, sore throat and trouble swallowing. Eyes: Negative for visual disturbance. Respiratory: Positive for shortness of breath. Negative for cough and wheezing. Cardiovascular: Negative for chest pain, palpitations and leg swelling. Gastrointestinal:        SEE HPI. Genitourinary: Negative for dysuria and frequency. Allergic/Immunologic: Positive for environmental allergies. Neurological: Positive for headaches. Negative for dizziness. Hematological: Bruises/bleeds easily. GENERAL PHYSICAL EXAM     Vitals: Review current vital signs per RN flow sheet. BP (!) 145/58   Pulse 79   Temp 97.1 °F (36.2 °C) (Infrared)   Resp 20   Ht 5' 2\" (1.575 m)   Wt 245 lb (111.1 kg)   SpO2 96%   BMI 44.81 kg/m²     GENERAL APPEARANCE:   Flori Owens is 79 y.o.,  female, severely obese, nourished, conscious, alert. Does not appear to be in any distress or pain at this time. Physical Exam  Constitutional:       General: She is not in acute distress. Appearance: She is well-developed. She is not ill-appearing, toxic-appearing or diaphoretic. HENT:      Head: Normocephalic. Right Ear: External ear normal.      Left Ear: External ear normal.      Mouth/Throat:      Palate: Mass (Flesh colored, irregular shaped mass to soft palate, patient is unaware of existence, denies pain) present. Pharynx: No oropharyngeal exudate or posterior oropharyngeal erythema. Tonsils: No tonsillar abscesses. Eyes:      General:         Right eye: No discharge. Left eye: No discharge.       Conjunctiva/sclera: Conjunctivae normal.      Pupils: Pupils are unequal (Right pupil slightly irregularly shaped (hx of some type of procedure to right eye), right pupil- 3 mm, left pupil- 3.2 mm). Right eye: Pupil is not round. Pupil is reactive and not sluggish. Left eye: Pupil is round, reactive and not sluggish. Cardiovascular:      Rate and Rhythm: Normal rate. Rhythm irregular. Pulses: Intact distal pulses. Radial pulses are 2+ on the right side and 2+ on the left side. Dorsalis pedis pulses are 2+ on the right side and 2+ on the left side. Posterior tibial pulses are 2+ on the right side and 2+ on the left side. Heart sounds: Normal heart sounds. Comments: Frequent skipped beats noted on exam, sustained underlying rhythm is regular. Pulmonary:      Effort: No accessory muscle usage. Respiratory distress: Patient appeared SOB while walking into exam room, improved w/rest- does not appear SOB while sitting after a minute or so. Breath sounds: Normal breath sounds. No decreased breath sounds, wheezing, rhonchi or rales. Abdominal:      General: Bowel sounds are normal. There is no distension. Palpations: Abdomen is soft. There is no mass. Tenderness: There is no abdominal tenderness. There is no guarding or rebound. Musculoskeletal:         General: Normal range of motion. Right lower leg: No swelling or tenderness. Left lower leg: No swelling or tenderness. Skin:     General: Skin is warm and dry. Neurological:      Mental Status: She is alert and oriented to person, place, and time.    Psychiatric:         Behavior: Behavior normal.       RECENT LAB WORK     Lab Results   Component Value Date     03/23/2021    K 4.9 03/23/2021     03/23/2021    CO2 27 03/23/2021    BUN 27 (H) 03/23/2021    CREATININE 0.76 03/23/2021    GLUCOSE 131 (H) 03/23/2021    CALCIUM 9.7 03/23/2021    PROT 8.2 03/23/2021    LABALBU 4.2 03/23/2021    BILITOT 0.32 03/23/2021    ALKPHOS 89 03/23/2021    AST 25 03/23/2021    ALT 18 03/23/2021    LABGLOM >60 03/23/2021    GFRAA >60 03/23/2021     Lab Results   Component Value Date    WBC 7.4 03/23/2021    HGB 13.8 03/23/2021    HCT 42.8 03/23/2021    MCV 91.9 03/23/2021     03/23/2021     PROVISIONAL DIAGNOSES / SURGERY:      CONSTIPATION    COLONOSCOPY DIAGNOSTIC    Patient Active Problem List    Diagnosis Date Noted    Osteoarthritis of right glenohumeral joint 04/03/2021    CAD (coronary artery disease) 11/18/2013    Acid reflux 11/18/2013    HTN (hypertension) 11/18/2013    Hypercholesteremia 11/18/2013         Patient w/several abnormal assessment findings as noted above. Patient denies any known hx of irregular heart rhythm, also denies known hx of mass noted to soft palate- advised f/u with PCP, dentist. Abnormal right pupil shape/unequal size noted, most likely chronic r/t hx of procedure to right eye, although patient had not noted abnormality in the past. Recommended f/u with PCP and eye specialist. She does state she has upcoming f/u with PCP scheduled- noted to be 7-28-21 per chart. Discussed pertinent findings w/Dr. Cheryl Cates.     LILLIAN Leonard - CNP on 7/20/2021 at 6:36 AM

## 2021-07-20 NOTE — OP NOTE
COLONOSCOPY    DATE OF PROCEDURE: 7/20/2021    SURGEON: Jeanette Ramos MD    ASSISTANT: None    PREOPERATIVE DIAGNOSIS: Screening colonoscopy. POSTOPERATIVE DIAGNOSIS: Diverticulosis. OPERATION: Total colonoscopy     ANESTHESIA: MAC    ESTIMATED BLOOD LOSS: None    COMPLICATIONS: None     SPECIMENS:  Was Not Obtained    HISTORY: The patient is a 79y.o. year old female with history of above preop diagnosis. I recommended colonoscopy with possible biopsy or polypectomy and I explained the risk, benefits, expected outcome, and alternatives to the procedure. Risks included but are not limited to bleeding, infection, respiratory distress, hypotension, and perforation of the colon and possibility of missing a lesion. The patient understands and is in agreement. PROCEDURE:  The patient's SPO2 remained above 90% throughout the procedure. Digital rectal exam was normal.  The colonoscope was inserted through the anus into the rectum and advanced under direct vision to the cecum without difficulty. Terminal ileum was examined for approximately 2 inches. The prep was fair. Findings:  Terminal ileum: normal    Cecum/Ascending colon: normal    Transverse colon: normal    Descending/Sigmoid colon: normal, has diverticulosis, Has pellets of stool in sigmoid colon obscuring the view    Rectum/Anus: examined in normal and retroflexed positions and was normal    Withdrawal Time was (minutes): 15          The colon was decompressed and the scope was removed. The patient tolerated the procedure without unusual events. During the procedure, the patient's blood pressure, pulse and oxygen saturation remained stable and documented. No unusual events occurred during the procedure. Patient was transferred to recovery room and will be discharged when criteria is met. Recommendations/Plan:   1. F/U Biopsies  2. F/U In Office as instructed  3. Discussed with the family  4. High fiber diet   5.  Precautions to avoid constipation     Next colonoscopy: 5 years.   If Colonoscopy is less than 10 years the recommended reason is due: Fair preparation, diverticulosis    Electronically signed by Jeanette Ramos MD  on 7/20/2021 at 8:01 AM

## 2021-07-20 NOTE — ANESTHESIA POSTPROCEDURE EVALUATION
Department of Anesthesiology  Postprocedure Note    Patient: Hermelinda Mccracken  MRN: [id number]  YOB: 1940  Date of evaluation: 7/20/2021  Time:  9:13 AM     Procedure Summary     Date: 07/20/21 Room / Location: [facility] ENDO 01 / [facility] ENDO    Anesthesia Start: 0732 Anesthesia Stop: 1836    Procedure: COLONOSCOPY DIAGNOSTIC (N/A ) Diagnosis: (CONSTIPATION (PT FULLY VACCINATED))    Surgeons: Ham Baldwin MD Responsible Provider: Ramses Nunez MD    Anesthesia Type: general ASA Status: 3          Anesthesia Type: general    Kevin Phase I: Kevin Score: 10    Kevin Phase II:      Last vitals: Reviewed and per EMR flowsheets.        Anesthesia Post Evaluation    Comments: POST- ANESTHESIA EVALUATION       Pt Name: Hermelinda Mccracken  MRN: [id number]  YOB: 1940  Date of evaluation: 7/20/2021  Time:  9:13 AM      BP (!) 131/45   Pulse 80   Temp 98.2 °F (36.8 °C)   Resp 17   Ht 5' 2\" (1.575 m)   Wt 245 lb (111.1 kg)   SpO2 97%   BMI 44.81 kg/m²      Consciousness Level  Awake  Cardiopulmonary Status  Stable  Pain Adequately Treated YES  Nausea / Vomiting  NO  Adequate Hydration  YES  Anesthesia Related Complications NONE      Electronically signed by Ramses Nunez MD on 7/20/2021 at 9:13 AM     POST- ANESTHESIA EVALUATION       Pt Name: Hermelinda Mccracken  MRN: [id number]  YOB: 1940  Date of evaluation: 7/20/2021  Time:  9:13 AM      BP (!) 131/45   Pulse 80   Temp 98.2 °F (36.8 °C)   Resp 17   Ht 5' 2\" (1.575 m)   Wt 245 lb (111.1 kg)   SpO2 97%   BMI 44.81 kg/m²      Consciousness Level  Awake  Cardiopulmonary Status  Stable  Pain Adequately Treated YES  Nausea / Vomiting  NO  Adequate Hydration  YES  Anesthesia Related Complications NONE      Electronically signed by Ramses Nunez MD on 7/20/2021 at 9:13 AM     POST- ANESTHESIA EVALUATION       Pt Name: Hermelinda Mccracken  MRN: [id number]  YOB: 1940  Date of evaluation: 7/20/2021  Time:  9:13 AM      BP (!) 131/45   Pulse 80 Temp 98.2 °F (36.8 °C)   Resp 17   Ht 5' 2\" (1.575 m)   Wt 245 lb (111.1 kg)   SpO2 97%   BMI 44.81 kg/m²      Consciousness Level  Awake  Cardiopulmonary Status  Stable  Pain Adequately Treated YES  Nausea / Vomiting  NO  Adequate Hydration  YES  Anesthesia Related Complications NONE      Electronically signed by Precious Benson MD on 7/20/2021 at 9:13 AM

## 2021-08-08 ENCOUNTER — HOSPITAL ENCOUNTER (INPATIENT)
Age: 81
LOS: 1 days | Discharge: HOME | DRG: 313 | End: 2021-08-09
Attending: EMERGENCY MEDICINE | Admitting: STUDENT IN AN ORGANIZED HEALTH CARE EDUCATION/TRAINING PROGRAM
Payer: MEDICARE

## 2021-08-08 ENCOUNTER — APPOINTMENT (OUTPATIENT)
Dept: GENERAL RADIOLOGY | Age: 81
DRG: 313 | End: 2021-08-08
Payer: MEDICARE

## 2021-08-08 DIAGNOSIS — R07.9 CHEST PAIN, UNSPECIFIED TYPE: Primary | ICD-10-CM

## 2021-08-08 PROBLEM — E78.2 MIXED HYPERLIPIDEMIA: Status: ACTIVE | Noted: 2021-08-08

## 2021-08-08 PROBLEM — E66.813 CLASS 3 SEVERE OBESITY WITHOUT SERIOUS COMORBIDITY WITH BODY MASS INDEX (BMI) OF 45.0 TO 49.9 IN ADULT: Status: ACTIVE | Noted: 2021-08-08

## 2021-08-08 PROBLEM — E66.01 CLASS 3 SEVERE OBESITY WITHOUT SERIOUS COMORBIDITY WITH BODY MASS INDEX (BMI) OF 45.0 TO 49.9 IN ADULT (HCC): Status: ACTIVE | Noted: 2021-08-08

## 2021-08-08 LAB
ABSOLUTE EOS #: 0.5 K/UL (ref 0–0.4)
ABSOLUTE IMMATURE GRANULOCYTE: ABNORMAL K/UL (ref 0–0.3)
ABSOLUTE LYMPH #: 1.85 K/UL (ref 1–4.8)
ABSOLUTE MONO #: 0.85 K/UL (ref 0.1–1.3)
ANION GAP SERPL CALCULATED.3IONS-SCNC: 10 MMOL/L (ref 9–17)
ATYPICAL LYMPHOCYTE ABSOLUTE COUNT: 0.07 K/UL
ATYPICAL LYMPHOCYTES: 1 %
BASOPHILS # BLD: 1 % (ref 0–2)
BASOPHILS ABSOLUTE: 0.07 K/UL (ref 0–0.2)
BNP INTERPRETATION: NORMAL
BUN BLDV-MCNC: 15 MG/DL (ref 8–23)
BUN/CREAT BLD: ABNORMAL (ref 9–20)
CALCIUM SERPL-MCNC: 9.3 MG/DL (ref 8.6–10.4)
CHLORIDE BLD-SCNC: 102 MMOL/L (ref 98–107)
CO2: 28 MMOL/L (ref 20–31)
CREAT SERPL-MCNC: 0.69 MG/DL (ref 0.5–0.9)
D-DIMER QUANTITATIVE: 0.99 MG/L FEU (ref 0–0.59)
DIFFERENTIAL TYPE: ABNORMAL
EOSINOPHILS RELATIVE PERCENT: 7 % (ref 0–4)
GFR AFRICAN AMERICAN: >60 ML/MIN
GFR NON-AFRICAN AMERICAN: >60 ML/MIN
GFR SERPL CREATININE-BSD FRML MDRD: ABNORMAL ML/MIN/{1.73_M2}
GFR SERPL CREATININE-BSD FRML MDRD: ABNORMAL ML/MIN/{1.73_M2}
GLUCOSE BLD-MCNC: 115 MG/DL (ref 70–99)
HCT VFR BLD CALC: 38.7 % (ref 36–46)
HEMOGLOBIN: 12.9 G/DL (ref 12–16)
IMMATURE GRANULOCYTES: ABNORMAL %
INR BLD: 1
LYMPHOCYTES # BLD: 26 % (ref 24–44)
MCH RBC QN AUTO: 30.1 PG (ref 26–34)
MCHC RBC AUTO-ENTMCNC: 33.3 G/DL (ref 31–37)
MCV RBC AUTO: 90.4 FL (ref 80–100)
MONOCYTES # BLD: 12 % (ref 1–7)
MORPHOLOGY: ABNORMAL
NRBC AUTOMATED: ABNORMAL PER 100 WBC
PARTIAL THROMBOPLASTIN TIME: 34.8 SEC (ref 24–36)
PDW BLD-RTO: 13.8 % (ref 11.5–14.9)
PLATELET # BLD: 277 K/UL (ref 150–450)
PLATELET ESTIMATE: ABNORMAL
PMV BLD AUTO: 7.3 FL (ref 6–12)
POTASSIUM SERPL-SCNC: 4.8 MMOL/L (ref 3.7–5.3)
PRO-BNP: 135 PG/ML
PROTHROMBIN TIME: 12.9 SEC (ref 11.8–14.6)
RBC # BLD: 4.28 M/UL (ref 4–5.2)
RBC # BLD: ABNORMAL 10*6/UL
SEG NEUTROPHILS: 53 % (ref 36–66)
SEGMENTED NEUTROPHILS ABSOLUTE COUNT: 3.76 K/UL (ref 1.3–9.1)
SODIUM BLD-SCNC: 140 MMOL/L (ref 135–144)
TROPONIN INTERP: NORMAL
TROPONIN T: NORMAL NG/ML
TROPONIN, HIGH SENSITIVITY: 7 NG/L (ref 0–14)
WBC # BLD: 7.1 K/UL (ref 3.5–11)
WBC # BLD: ABNORMAL 10*3/UL

## 2021-08-08 PROCEDURE — 83880 ASSAY OF NATRIURETIC PEPTIDE: CPT

## 2021-08-08 PROCEDURE — 85379 FIBRIN DEGRADATION QUANT: CPT

## 2021-08-08 PROCEDURE — 85730 THROMBOPLASTIN TIME PARTIAL: CPT

## 2021-08-08 PROCEDURE — 6370000000 HC RX 637 (ALT 250 FOR IP): Performed by: INTERNAL MEDICINE

## 2021-08-08 PROCEDURE — 99282 EMERGENCY DEPT VISIT SF MDM: CPT

## 2021-08-08 PROCEDURE — 2580000003 HC RX 258: Performed by: INTERNAL MEDICINE

## 2021-08-08 PROCEDURE — 93005 ELECTROCARDIOGRAM TRACING: CPT | Performed by: EMERGENCY MEDICINE

## 2021-08-08 PROCEDURE — 6360000002 HC RX W HCPCS: Performed by: INTERNAL MEDICINE

## 2021-08-08 PROCEDURE — 96372 THER/PROPH/DIAG INJ SC/IM: CPT

## 2021-08-08 PROCEDURE — 6370000000 HC RX 637 (ALT 250 FOR IP): Performed by: STUDENT IN AN ORGANIZED HEALTH CARE EDUCATION/TRAINING PROGRAM

## 2021-08-08 PROCEDURE — 84484 ASSAY OF TROPONIN QUANT: CPT

## 2021-08-08 PROCEDURE — 85610 PROTHROMBIN TIME: CPT

## 2021-08-08 PROCEDURE — 6370000000 HC RX 637 (ALT 250 FOR IP): Performed by: EMERGENCY MEDICINE

## 2021-08-08 PROCEDURE — 85025 COMPLETE CBC W/AUTO DIFF WBC: CPT

## 2021-08-08 PROCEDURE — 2060000000 HC ICU INTERMEDIATE R&B

## 2021-08-08 PROCEDURE — 36415 COLL VENOUS BLD VENIPUNCTURE: CPT

## 2021-08-08 PROCEDURE — 71045 X-RAY EXAM CHEST 1 VIEW: CPT

## 2021-08-08 PROCEDURE — 80048 BASIC METABOLIC PNL TOTAL CA: CPT

## 2021-08-08 RX ORDER — ASPIRIN 325 MG
162.5 TABLET ORAL DAILY
Status: DISCONTINUED | OUTPATIENT
Start: 2021-08-08 | End: 2021-08-08

## 2021-08-08 RX ORDER — METOPROLOL TARTRATE 50 MG/1
50 TABLET, FILM COATED ORAL 2 TIMES DAILY
Status: DISCONTINUED | OUTPATIENT
Start: 2021-08-08 | End: 2021-08-09 | Stop reason: HOSPADM

## 2021-08-08 RX ORDER — AMLODIPINE BESYLATE 5 MG/1
5 TABLET ORAL DAILY
Status: DISCONTINUED | OUTPATIENT
Start: 2021-08-08 | End: 2021-08-09 | Stop reason: HOSPADM

## 2021-08-08 RX ORDER — ONDANSETRON 2 MG/ML
4 INJECTION INTRAMUSCULAR; INTRAVENOUS EVERY 6 HOURS PRN
Status: DISCONTINUED | OUTPATIENT
Start: 2021-08-08 | End: 2021-08-09 | Stop reason: HOSPADM

## 2021-08-08 RX ORDER — ATORVASTATIN CALCIUM 20 MG/1
20 TABLET, FILM COATED ORAL DAILY
Refills: 0 | Status: DISCONTINUED | OUTPATIENT
Start: 2021-08-08 | End: 2021-08-09 | Stop reason: HOSPADM

## 2021-08-08 RX ORDER — ONDANSETRON 4 MG/1
4 TABLET, ORALLY DISINTEGRATING ORAL EVERY 8 HOURS PRN
Status: DISCONTINUED | OUTPATIENT
Start: 2021-08-08 | End: 2021-08-09 | Stop reason: HOSPADM

## 2021-08-08 RX ORDER — NITROGLYCERIN 0.4 MG/1
0.4 TABLET SUBLINGUAL EVERY 5 MIN PRN
Status: DISCONTINUED | OUTPATIENT
Start: 2021-08-08 | End: 2021-08-09 | Stop reason: HOSPADM

## 2021-08-08 RX ORDER — ASPIRIN 81 MG/1
324 TABLET, CHEWABLE ORAL ONCE
Status: COMPLETED | OUTPATIENT
Start: 2021-08-08 | End: 2021-08-08

## 2021-08-08 RX ORDER — ESOMEPRAZOLE MAGNESIUM 40 MG/1
20 FOR SUSPENSION ORAL DAILY
Status: DISCONTINUED | OUTPATIENT
Start: 2021-08-08 | End: 2021-08-08 | Stop reason: CLARIF

## 2021-08-08 RX ORDER — FUROSEMIDE 20 MG/1
20 TABLET ORAL ONCE
Status: COMPLETED | OUTPATIENT
Start: 2021-08-08 | End: 2021-08-08

## 2021-08-08 RX ORDER — ASPIRIN 81 MG/1
81 TABLET ORAL DAILY
Status: DISCONTINUED | OUTPATIENT
Start: 2021-08-09 | End: 2021-08-09 | Stop reason: HOSPADM

## 2021-08-08 RX ORDER — LISINOPRIL 20 MG/1
20 TABLET ORAL NIGHTLY
Status: DISCONTINUED | OUTPATIENT
Start: 2021-08-08 | End: 2021-08-09 | Stop reason: HOSPADM

## 2021-08-08 RX ORDER — ACETAMINOPHEN 325 MG/1
650 TABLET ORAL EVERY 4 HOURS PRN
Status: DISCONTINUED | OUTPATIENT
Start: 2021-08-08 | End: 2021-08-09 | Stop reason: HOSPADM

## 2021-08-08 RX ORDER — SODIUM CHLORIDE 9 MG/ML
25 INJECTION, SOLUTION INTRAVENOUS PRN
Status: DISCONTINUED | OUTPATIENT
Start: 2021-08-08 | End: 2021-08-09 | Stop reason: HOSPADM

## 2021-08-08 RX ORDER — ISOSORBIDE MONONITRATE 60 MG/1
60 TABLET, EXTENDED RELEASE ORAL DAILY
Status: DISCONTINUED | OUTPATIENT
Start: 2021-08-09 | End: 2021-08-09 | Stop reason: HOSPADM

## 2021-08-08 RX ORDER — SODIUM CHLORIDE 0.9 % (FLUSH) 0.9 %
5-40 SYRINGE (ML) INJECTION PRN
Status: DISCONTINUED | OUTPATIENT
Start: 2021-08-08 | End: 2021-08-09 | Stop reason: HOSPADM

## 2021-08-08 RX ORDER — PANTOPRAZOLE SODIUM 40 MG/1
40 TABLET, DELAYED RELEASE ORAL
Status: DISCONTINUED | OUTPATIENT
Start: 2021-08-09 | End: 2021-08-09 | Stop reason: HOSPADM

## 2021-08-08 RX ORDER — SODIUM CHLORIDE 0.9 % (FLUSH) 0.9 %
5-40 SYRINGE (ML) INJECTION EVERY 12 HOURS SCHEDULED
Status: DISCONTINUED | OUTPATIENT
Start: 2021-08-08 | End: 2021-08-09 | Stop reason: HOSPADM

## 2021-08-08 RX ORDER — LANSOPRAZOLE
30 KIT
Status: DISCONTINUED | OUTPATIENT
Start: 2021-08-09 | End: 2021-08-08 | Stop reason: CLARIF

## 2021-08-08 RX ADMIN — Medication 10 ML: at 20:29

## 2021-08-08 RX ADMIN — FUROSEMIDE 20 MG: 20 TABLET ORAL at 19:56

## 2021-08-08 RX ADMIN — ASPIRIN 324 MG: 81 TABLET, CHEWABLE ORAL at 05:08

## 2021-08-08 RX ADMIN — ATORVASTATIN CALCIUM 20 MG: 20 TABLET, FILM COATED ORAL at 19:56

## 2021-08-08 RX ADMIN — AMLODIPINE BESYLATE 5 MG: 5 TABLET ORAL at 19:56

## 2021-08-08 RX ADMIN — LISINOPRIL 20 MG: 20 TABLET ORAL at 20:28

## 2021-08-08 RX ADMIN — METOPROLOL TARTRATE 50 MG: 50 TABLET, FILM COATED ORAL at 20:28

## 2021-08-08 RX ADMIN — ENOXAPARIN SODIUM 40 MG: 40 INJECTION SUBCUTANEOUS at 16:11

## 2021-08-08 ASSESSMENT — PAIN SCALES - GENERAL
PAINLEVEL_OUTOF10: 0
PAINLEVEL_OUTOF10: 0

## 2021-08-08 ASSESSMENT — ENCOUNTER SYMPTOMS
SHORTNESS OF BREATH: 1
BACK PAIN: 0
COLOR CHANGE: 0
ABDOMINAL PAIN: 0
EYE PAIN: 0

## 2021-08-08 ASSESSMENT — HEART SCORE: ECG: 0

## 2021-08-08 NOTE — PLAN OF CARE
Problem: Safety:  Goal: Free from accidental physical injury  Description: Free from accidental physical injury  Outcome: Ongoing  Goal: Free from intentional harm  Description: Free from intentional harm  Outcome: Ongoing     Problem: Daily Care:  Goal: Daily care needs are met  Description: Daily care needs are met  Outcome: Ongoing     Problem: Pain:  Goal: Patient's pain/discomfort is manageable  Description: Patient's pain/discomfort is manageable  Outcome: Ongoing     Problem: Skin Integrity:  Goal: Skin integrity will stabilize  Description: Skin integrity will stabilize  Outcome: Ongoing

## 2021-08-08 NOTE — H&P
[facility]    HISTORY AND PHYSICAL EXAMINATION            Date:   8/8/2021  Patient name:  Sabina Alfaro  Date of admission:  8/8/2021  4:23 AM  MRN:   [id number]  Account:  [de-identified]  YOB: 1940  PCP:    Loida Mccurdy MD  Room:   2101/2101-01  Code Status:    Full Code    Chief Complaint:     Chief Complaint   Patient presents with    Chest Pain     heaviness         History Obtained From:     patient, electronic medical record    History of Present Illness:     Sabina Alfaro is a [de-identified] y.o. Non- / non  female who presents with Chest Pain (heaviness)   and is admitted to the hospital for the management of <principal problem not specified>. 78-year-old female presented to chief complaint of chest pain, complaining pressure-like, unable to tell on the scale of 1-10, started yesterday, states it was intermittent but this morning appears to be more constant, sub centric, denies any radiation, no radiation, no aggravating or relieving factors, severity moderate, complaining of associated shortness of breath and dyspnea on exertion with some nausea. Denies any recent illness. Patient did receive COVID vaccine, follows with Dr. Jocelyn Mann complaining of cough but no sputum production.     Troponin x2 not elevated done 1 hour apart approximately  Chest x-ray mild-to-moderate cardiomegaly otherwise unremarkable    Past Medical History:     Past Medical History:   Diagnosis Date    Arthritis     Borderline diabetic     controlled with diet    CAD (coronary artery disease)     Constipation     GERD (gastroesophageal reflux disease)     Hyperlipidemia     Hypertension     Incisional hernia     Irregular heart beat     Noted 7-20-21    Mass of soft palate     Noted 7-20-21    Prolonged emergence from general anesthesia     Seasonal allergies     Short of breath on exertion     Wears glasses         Past Surgical History: Past Surgical History:   Procedure Laterality Date    CARPAL TUNNEL RELEASE Bilateral     2 times on the right and 2 times on the left    CHOLECYSTECTOMY      COLONOSCOPY N/A 7/20/2021    COLONOSCOPY DIAGNOSTIC performed by Britany Worrell MD at [address] Right     Procedure done to right eye   [address]    \"they took part of the uterus. I still have periods but they were every other month\", doesn't know why- no longer having menses as of 7-20-21    KNEE ARTHROSCOPY Left     NECK SURGERY  2001    titanium screw in neck by Dr. Sam [facility] N/A 04/05/2019    OPEN HERNIA INCISIONAL REPAIR W/MESH performed by Darrius Godoy MD at [address]        Medications Prior to Admission:     Prior to Admission medications    Medication Sig Start Date End Date Taking?  Authorizing Provider   simvastatin (ZOCOR) 40 MG tablet TAKE 1 TABLET BY MOUTH EVERY DAY EVERY NIGHT 8/6/21  Yes Ranjeet Gutierrez MD   metoprolol tartrate (LOPRESSOR) 50 MG tablet TAKE 1 TABLET BY MOUTH TWICE A DAY 8/6/21  Yes Ranjeet Gutierrez MD   ibuprofen (ADVIL;MOTRIN) 800 MG tablet TAKE 1 TABLET BY MOUTH EVERY 6 HOURS AS NEEDED FOR PAIN 7/24/21  Yes Ranjeet Gutierrez MD   magnesium hydroxide (MILK OF MAGNESIA) 400 MG/5ML suspension Please follow instructions provided by physicians office 7/12/21  Yes Britany Worrell MD   Multiple Vitamins-Minerals (HAIR SKIN AND NAILS FORMULA PO) Take 2 tablets by mouth daily   Yes Historical Provider, MD   amLODIPine (NORVASC) 5 MG tablet TAKE 1 TABLET BY MOUTH EVERY DAY 6/14/21  Yes Ranjeet Gutierrez MD   isosorbide mononitrate (IMDUR) 60 MG extended release tablet TAKE 1 TABLET BY MOUTH EVERY DAY 5/3/21  Yes Ranjeet Gutierrez MD   b complex vitamins capsule Take 1 capsule by mouth daily   Yes Historical Provider, MD   Glucosamine-Chondroitin (GLUCOSAMINE CHONDR COMPLEX PO) Take 2 capsules by mouth daily Yes Historical Provider, MD   lisinopril (PRINIVIL;ZESTRIL) 40 MG tablet TAKE 1/2 TABLET BY MOUTH DAILY  Patient taking differently: Take 20 mg by mouth nightly TAKE 1/2 TABLET BY MOUTH DAILY 2/10/21  Yes Orlando Davidson MD   esomeprazole Magnesium (NEXIUM) 20 MG PACK Take 20 mg by mouth daily   Yes Historical Provider, MD   aspirin 325 MG tablet Take 162.5 mg by mouth daily Takes 1/2 tablet of aspirin   Yes Historical Provider, MD        Allergies:     Medrol [methylprednisolone]    Social History:     Tobacco:    reports that she has never smoked. She has never used smokeless tobacco.  Alcohol:      reports no history of alcohol use. Drug Use:  reports no history of drug use. Family History:     Family History   Problem Relation Age of Onset    Diabetes Mother     Heart Failure Mother     Heart Attack Father        Review of Systems:     Positive and Negative as described in HPI.     CONSTITUTIONAL:  negative for fevers, chills, sweats, fatigue, weight loss  HEENT:  negative for vision, hearing changes, runny nose, throat pain  RESPIRATORY:   Positive for shortness of breath particularly with exertion, congestion, wheezing  CARDIOVASCULAR:   Positive for chest pressure, no palpitations  GASTROINTESTINAL:  negative for  vomiting, diarrhea, constipation, change in bowel habits, abdominal pain   GENITOURINARY:  negative for difficulty of urination, burning with urination, frequency   INTEGUMENT:  negative for rash, skin lesions, easy bruising   HEMATOLOGIC/LYMPHATIC:  negative for swelling/edema   ALLERGIC/IMMUNOLOGIC:  negative for urticaria , itching  ENDOCRINE:  negative increase in drinking, increase in urination, hot or cold intolerance  NEUROLOGICAL:  negative for headaches, dizziness, lightheadedness, numbness, pain, tingling extremities  BEHAVIOR/PSYCH:  negative for depression, anxiety    Physical Exam:   BP (!) 141/70   Pulse 66   Temp 98.4 °F (36.9 °C) (Oral)   Resp 16   Ht 5' 2\" (1.575 m) Wt 250 lb (113.4 kg)   SpO2 97%   BMI 45.73 kg/m²   Temp (24hrs), Av.1 °F (36.7 °C), Min:97.8 °F (36.6 °C), Max:98.4 °F (36.9 °C)    No results for input(s): POCGLU in the last 72 hours.   No intake or output data in the 24 hours ending 21    General Appearance: alert, well appearing, and in no acute distress  Mental status: oriented to person, place, and time  Head: normocephalic, atraumatic  Eye: no icterus, redness, pupils equal and reactive, extraocular eye movements intact, conjunctiva clear  Ear: normal external ear, no discharge, hearing intact  Nose: no drainage noted  Mouth: mucous membranes moist  Neck: supple, no carotid bruits, thyroid not palpable  Lungs: Bilateral equal air entry, clear to ausculation, no wheezing, rales or rhonchi, normal effort  Cardiovascular: normal rate, regular rhythm,   Abdomen: Soft, nontender, nondistended, normal bowel sounds  Neurologic: There are no new focal motor or sensory deficits, normal muscle tone and bulk, no abnormal sensation, normal speech, cranial nerves II through XII grossly intact  Skin: No gross lesions, rashes, bruising or bleeding on exposed skin area  Extremities: peripheral pulses palpable, no pedal edema or calf pain with palpation  Psych: normal affect    Investigations:      Laboratory Testing:  Recent Results (from the past 24 hour(s))   EKG 12 Lead    Collection Time: 21  4:34 AM   Result Value Ref Range    Ventricular Rate 76 BPM    Atrial Rate 70 BPM    P-R Interval 186 ms    QRS Duration 78 ms    Q-T Interval 396 ms    QTc Calculation (Bazett) 445 ms    P Axis 27 degrees    R Axis -22 degrees    T Axis 42 degrees   CBC Auto Differential    Collection Time: 21  5:02 AM   Result Value Ref Range    WBC 7.1 3.5 - 11.0 k/uL    RBC 4.28 4.0 - 5.2 m/uL    Hemoglobin 12.9 12.0 - 16.0 g/dL    Hematocrit 38.7 36 - 46 %    MCV 90.4 80 - 100 fL    MCH 30.1 26 - 34 pg    MCHC 33.3 31 - 37 g/dL    RDW 13.8 11.5 - 14.9 % Platelets 923 805 - 262 k/uL    MPV 7.3 6.0 - 12.0 fL    NRBC Automated NOT REPORTED per 100 WBC    Differential Type NOT REPORTED     Immature Granulocytes NOT REPORTED 0 %    Absolute Immature Granulocyte NOT REPORTED 0.00 - 0.30 k/uL    WBC Morphology NOT REPORTED     RBC Morphology NOT REPORTED     Platelet Estimate NOT REPORTED     Seg Neutrophils 53 36 - 66 %    Lymphocytes 26 24 - 44 %    Atypical Lymphocytes 1 %    Monocytes 12 (H) 1 - 7 %    Eosinophils % 7 (H) 0 - 4 %    Basophils 1 0 - 2 %    Segs Absolute 3.76 1.3 - 9.1 k/uL    Absolute Lymph # 1.85 1.0 - 4.8 k/uL    Atypical Lymphocytes Absolute 0.07 k/uL    Absolute Mono # 0.85 0.1 - 1.3 k/uL    Absolute Eos # 0.50 (H) 0.0 - 0.4 k/uL    Basophils Absolute 0.07 0.0 - 0.2 k/uL    Morphology HYPOCHROMIA PRESENT    Basic Metabolic Panel w/ Reflex to MG    Collection Time: 08/08/21  5:02 AM   Result Value Ref Range    Glucose 115 (H) 70 - 99 mg/dL    BUN 15 8 - 23 mg/dL    CREATININE 0.69 0.50 - 0.90 mg/dL    Bun/Cre Ratio NOT REPORTED 9 - 20    Calcium 9.3 8.6 - 10.4 mg/dL    Sodium 140 135 - 144 mmol/L    Potassium 4.8 3.7 - 5.3 mmol/L    Chloride 102 98 - 107 mmol/L    CO2 28 20 - 31 mmol/L    Anion Gap 10 9 - 17 mmol/L    GFR Non-African American >60 >60 mL/min    GFR African American >60 >60 mL/min    GFR Comment          GFR Staging NOT REPORTED    Troponin    Collection Time: 08/08/21  5:02 AM   Result Value Ref Range    Troponin, High Sensitivity 7 0 - 14 ng/L    Troponin T NOT REPORTED <0.03 ng/mL    Troponin Interp NOT REPORTED    Brain Natriuretic Peptide    Collection Time: 08/08/21  5:02 AM   Result Value Ref Range    Pro- <300 pg/mL    BNP Interpretation Pro-BNP Reference Range:    Protime-INR    Collection Time: 08/08/21  5:02 AM   Result Value Ref Range    Protime 12.9 11.8 - 14.6 sec    INR 1.0    APTT    Collection Time: 08/08/21  5:02 AM   Result Value Ref Range    PTT 34.8 24.0 - 36.0 sec   Troponin    Collection Time: 08/08/21 6:36 AM   Result Value Ref Range    Troponin, High Sensitivity 7 0 - 14 ng/L    Troponin T NOT REPORTED <0.03 ng/mL    Troponin Interp NOT REPORTED        Imaging/Diagnostics:  XR CHEST PORTABLE    Result Date: 8/8/2021  Mild-to-moderate cardiomegaly.  Otherwise, unremarkable single upright portable AP view of the chest.       Assessment :      Hospital Problems         Last Modified POA    Chest pain 8/8/2021 Yes          Plan:        aspirin, statin, ACE, beta-blocker   Lipitor  On amlodipine for blood pressure   On Imdur 60 mg daily   Cardiology consultation   Get 1 more troponin   Get 2D echocardiogram  Continue tele monitoring        Maricel Hopper MD  8/8/2021  6:12 PM    Copy sent to Dr. Orlando Davidson MD

## 2021-08-08 NOTE — ED NOTES
Report given to [facility]. Pending transport to 2101.      Emilia Adan RN  08/08/21 3842

## 2021-08-08 NOTE — ED PROVIDER NOTES
EMERGENCY DEPARTMENT ENCOUNTER    Pt Name: Antony Solorzano  MRN: [id number]  [place] 1940  Date of evaluation: 8/8/21  CHIEF COMPLAINT       Chief Complaint   Patient presents with    Chest Pain     heaviness     HISTORY OF PRESENT ILLNESS   67-year-old female presents for complaint of chest pain. Patient reports she started having chest pain yesterday, states it was intermittent, states that she had some pain again this morning that woke her up from sleep, states the pain is located in the center of her chest, describes it as heaviness, denies radiation of pain, denies anything making it better or worse, admits associated shortness of breath with some nausea, denies any recent illness or recent sick contacts, states that she is noticed a little bit more swelling in her lower extremities recently as well. The history is provided by the patient. REVIEW OF SYSTEMS     Review of Systems   Constitutional: Negative for fever. HENT: Negative for congestion and ear pain. Eyes: Negative for pain. Respiratory: Positive for shortness of breath. Cardiovascular: Positive for chest pain and leg swelling. Negative for palpitations. Gastrointestinal: Negative for abdominal pain. Genitourinary: Negative for dysuria and flank pain. Musculoskeletal: Negative for back pain. Skin: Negative for color change. Neurological: Negative for numbness and headaches. Psychiatric/Behavioral: Negative for confusion. All other systems reviewed and are negative.     PASTMEDICAL HISTORY     Past Medical History:   Diagnosis Date    Arthritis     Borderline diabetic     controlled with diet    CAD (coronary artery disease)     Constipation     GERD (gastroesophageal reflux disease)     Hyperlipidemia     Hypertension     Incisional hernia     Irregular heart beat     Noted 7-20-21    Mass of soft palate     Noted 7-20-21    Prolonged emergence from general anesthesia     Seasonal allergies     Short of breath on exertion     Wears glasses      Past Problem List  Patient Active Problem List   Diagnosis Code    CAD (coronary artery disease) I25.10    Acid reflux K21.9    HTN (hypertension) I10    Hypercholesteremia E78.00    Osteoarthritis of right glenohumeral joint M19.011     SURGICAL HISTORY       Past Surgical History:   Procedure Laterality Date    CARPAL TUNNEL RELEASE Bilateral     2 times on the right and 2 times on the left    CHOLECYSTECTOMY      COLONOSCOPY N/A 7/20/2021    COLONOSCOPY DIAGNOSTIC performed by Britany Worrell MD at [facility] Right     Procedure done to right eye   [facility]    \"they took part of the uterus.   I still have periods but they were every other month\", doesn't know why- no longer having menses as of 7-20-21    KNEE ARTHROSCOPY Left     NECK SURGERY  2001    titanium screw in neck by Dr. Sam [facility] N/A 04/05/2019    OPEN HERNIA INCISIONAL REPAIR W/MESH performed by Darrius Godoy MD at [facility]       Previous Medications    AMLODIPINE (NORVASC) 5 MG TABLET    TAKE 1 TABLET BY MOUTH EVERY DAY    ASPIRIN 325 MG TABLET    Take 162.5 mg by mouth daily Takes 1/2 tablet of aspirin    B COMPLEX VITAMINS CAPSULE    Take 1 capsule by mouth daily    ESOMEPRAZOLE MAGNESIUM (NEXIUM) 20 MG PACK    Take 20 mg by mouth daily    GLUCOSAMINE-CHONDROITIN (GLUCOSAMINE CHONDR COMPLEX PO)    Take 2 capsules by mouth daily     IBUPROFEN (ADVIL;MOTRIN) 800 MG TABLET    TAKE 1 TABLET BY MOUTH EVERY 6 HOURS AS NEEDED FOR PAIN    ISOSORBIDE MONONITRATE (IMDUR) 60 MG EXTENDED RELEASE TABLET    TAKE 1 TABLET BY MOUTH EVERY DAY    LISINOPRIL (PRINIVIL;ZESTRIL) 40 MG TABLET    TAKE 1/2 TABLET BY MOUTH DAILY    MAGNESIUM HYDROXIDE (MILK OF MAGNESIA) 400 MG/5ML SUSPENSION    Please follow instructions provided by physicians office    METOPROLOL TARTRATE (LOPRESSOR) 50 MG TABLET    TAKE 1 TABLET BY MOUTH TWICE A DAY    MULTIPLE VITAMINS-MINERALS (HAIR SKIN AND NAILS FORMULA PO)    Take 2 tablets by mouth daily    SIMVASTATIN (ZOCOR) 40 MG TABLET    TAKE 1 TABLET BY MOUTH EVERY DAY EVERY NIGHT     ALLERGIES     is allergic to medrol [methylprednisolone]. FAMILY HISTORY     She indicated that her mother is . She indicated that her father is . SOCIAL HISTORY       Social History     Tobacco Use    Smoking status: Never Smoker    Smokeless tobacco: Never Used   Vaping Use    Vaping Use: Never used   Substance Use Topics    Alcohol use: No     Alcohol/week: 0.0 standard drinks    Drug use: Never     PHYSICAL EXAM     INITIAL VITALS: BP (!) 144/75   Pulse 73   Temp 97.8 °F (36.6 °C) (Oral)   Resp 18   Ht 5' 2\" (1.575 m)   Wt 250 lb (113.4 kg)   SpO2 94%   BMI 45.73 kg/m²    Physical Exam  Vitals and nursing note reviewed. Constitutional:       General: She is not in acute distress. Appearance: Normal appearance. She is not toxic-appearing. HENT:      Head: Normocephalic and atraumatic. Nose: Nose normal.      Mouth/Throat:      Mouth: Mucous membranes are moist.      Pharynx: Oropharynx is clear. Eyes:      Extraocular Movements: Extraocular movements intact. Conjunctiva/sclera: Conjunctivae normal.   Cardiovascular:      Rate and Rhythm: Normal rate and regular rhythm. Pulses: Normal pulses. Dorsalis pedis pulses are 2+ on the right side and 2+ on the left side. Heart sounds: Normal heart sounds. Comments: Trace edema in bilateral lower extremities  Pulmonary:      Effort: Pulmonary effort is normal.      Breath sounds: Normal breath sounds. Abdominal:      General: Bowel sounds are normal. There is no distension. Palpations: Abdomen is soft. Tenderness: There is no abdominal tenderness. Musculoskeletal:         General: Normal range of motion. Cervical back: Normal range of motion.       Right lower leg: Edema present. Left lower leg: Edema present. Skin:     General: Skin is warm and dry. Capillary Refill: Capillary refill takes less than 2 seconds. Neurological:      General: No focal deficit present. Mental Status: She is alert. Psychiatric:         Mood and Affect: Mood normal.         MEDICAL DECISION MAKIN-year-old female presents for complaint of chest pain. On initial exam patient in no acute distress, vitals are stable, will check cardiac labs, will provide patient with dose of aspirin and nitro    Patient reevaluated states that chest pain was initially improved after nitro but is returning      Labs are reviewed and unremarkable    Patient does have significant cardiac history, heart score of 4, patient reports she has not had any sort of stress test or cath recently, given that patient is still having active chest pain and her risk factors, will admit for further cardiac work-up, patient is agreeable to admission    Spoke with Dr. Moisés Mccord who accepts admission. Patient demonstrates understanding and agreement with the plan, was given the opportunity to ask questions, and these questions were answered to the best of the provided information at this time. VS stable for transfer. This dictation was prepared using [employer] voice recognition software. CRITICAL CARE:       PROCEDURES:    Procedures    DIAGNOSTIC RESULTS   EKG:All EKG's are interpreted by the Emergency Department Physician who either signs or Co-signs this chart in the absence of a cardiologist.    Sinus rhythm rate of 67, normal axis, normal intervals, no significant ST segment elevation or depression, nonspecific T wave change    RADIOLOGY:All plain film, CT, MRI, and formal ultrasound images (except ED bedside ultrasound) are read by the radiologist, see reports below, unless otherwisenoted in MDM or here. XR CHEST PORTABLE   Final Result   Mild-to-moderate cardiomegaly.       Otherwise, unremarkable

## 2021-08-09 ENCOUNTER — APPOINTMENT (OUTPATIENT)
Dept: NON INVASIVE DIAGNOSTICS | Age: 81
DRG: 313 | End: 2021-08-09
Payer: MEDICARE

## 2021-08-09 ENCOUNTER — APPOINTMENT (OUTPATIENT)
Dept: NUCLEAR MEDICINE | Age: 81
DRG: 313 | End: 2021-08-09
Payer: MEDICARE

## 2021-08-09 VITALS
HEIGHT: 62 IN | DIASTOLIC BLOOD PRESSURE: 66 MMHG | SYSTOLIC BLOOD PRESSURE: 125 MMHG | OXYGEN SATURATION: 97 % | HEART RATE: 76 BPM | BODY MASS INDEX: 46.01 KG/M2 | TEMPERATURE: 97.5 F | WEIGHT: 250 LBS | RESPIRATION RATE: 12 BRPM

## 2021-08-09 LAB
CHOLESTEROL/HDL RATIO: 2.8
CHOLESTEROL: 143 MG/DL
HDLC SERPL-MCNC: 51 MG/DL
LDL CHOLESTEROL: 62 MG/DL (ref 0–130)
LV EF: 63 %
LV EF: 70 %
LVEF MODALITY: NORMAL
LVEF MODALITY: NORMAL
TRIGL SERPL-MCNC: 150 MG/DL
TROPONIN INTERP: NORMAL
TROPONIN T: NORMAL NG/ML
TROPONIN, HIGH SENSITIVITY: 8 NG/L (ref 0–14)
VLDLC SERPL CALC-MCNC: ABNORMAL MG/DL (ref 1–30)

## 2021-08-09 PROCEDURE — 6370000000 HC RX 637 (ALT 250 FOR IP): Performed by: INTERNAL MEDICINE

## 2021-08-09 PROCEDURE — 78452 HT MUSCLE IMAGE SPECT MULT: CPT

## 2021-08-09 PROCEDURE — A9500 TC99M SESTAMIBI: HCPCS | Performed by: INTERNAL MEDICINE

## 2021-08-09 PROCEDURE — 36415 COLL VENOUS BLD VENIPUNCTURE: CPT

## 2021-08-09 PROCEDURE — 6360000002 HC RX W HCPCS: Performed by: INTERNAL MEDICINE

## 2021-08-09 PROCEDURE — 2580000003 HC RX 258: Performed by: INTERNAL MEDICINE

## 2021-08-09 PROCEDURE — 3430000000 HC RX DIAGNOSTIC RADIOPHARMACEUTICAL: Performed by: INTERNAL MEDICINE

## 2021-08-09 PROCEDURE — 93005 ELECTROCARDIOGRAM TRACING: CPT | Performed by: INTERNAL MEDICINE

## 2021-08-09 PROCEDURE — 84484 ASSAY OF TROPONIN QUANT: CPT

## 2021-08-09 PROCEDURE — C8929 TTE W OR WO FOL WCON,DOPPLER: HCPCS

## 2021-08-09 PROCEDURE — 6370000000 HC RX 637 (ALT 250 FOR IP): Performed by: STUDENT IN AN ORGANIZED HEALTH CARE EDUCATION/TRAINING PROGRAM

## 2021-08-09 PROCEDURE — 6360000004 HC RX CONTRAST MEDICATION: Performed by: INTERNAL MEDICINE

## 2021-08-09 PROCEDURE — 96372 THER/PROPH/DIAG INJ SC/IM: CPT

## 2021-08-09 PROCEDURE — 93017 CV STRESS TEST TRACING ONLY: CPT

## 2021-08-09 PROCEDURE — 80061 LIPID PANEL: CPT

## 2021-08-09 RX ORDER — NITROGLYCERIN 0.4 MG/1
0.4 TABLET SUBLINGUAL EVERY 5 MIN PRN
Status: ACTIVE | OUTPATIENT
Start: 2021-08-09 | End: 2021-08-09

## 2021-08-09 RX ORDER — ALBUTEROL SULFATE 90 UG/1
2 AEROSOL, METERED RESPIRATORY (INHALATION) PRN
Status: ACTIVE | OUTPATIENT
Start: 2021-08-09 | End: 2021-08-09

## 2021-08-09 RX ORDER — AMINOPHYLLINE DIHYDRATE 25 MG/ML
50 INJECTION, SOLUTION INTRAVENOUS PRN
Status: ACTIVE | OUTPATIENT
Start: 2021-08-09 | End: 2021-08-09

## 2021-08-09 RX ORDER — METOPROLOL TARTRATE 5 MG/5ML
5 INJECTION INTRAVENOUS EVERY 5 MIN PRN
Status: ACTIVE | OUTPATIENT
Start: 2021-08-09 | End: 2021-08-09

## 2021-08-09 RX ORDER — SODIUM CHLORIDE 0.9 % (FLUSH) 0.9 %
5-40 SYRINGE (ML) INJECTION PRN
Status: ACTIVE | OUTPATIENT
Start: 2021-08-09 | End: 2021-08-09

## 2021-08-09 RX ORDER — ATROPINE SULFATE 0.1 MG/ML
0.5 INJECTION INTRAVENOUS EVERY 5 MIN PRN
Status: ACTIVE | OUTPATIENT
Start: 2021-08-09 | End: 2021-08-09

## 2021-08-09 RX ORDER — SODIUM CHLORIDE 9 MG/ML
500 INJECTION, SOLUTION INTRAVENOUS CONTINUOUS PRN
Status: ACTIVE | OUTPATIENT
Start: 2021-08-09 | End: 2021-08-09

## 2021-08-09 RX ORDER — SODIUM CHLORIDE 0.9 % (FLUSH) 0.9 %
10 SYRINGE (ML) INJECTION PRN
Status: DISCONTINUED | OUTPATIENT
Start: 2021-08-09 | End: 2021-08-09 | Stop reason: HOSPADM

## 2021-08-09 RX ADMIN — ATORVASTATIN CALCIUM 20 MG: 20 TABLET, FILM COATED ORAL at 07:38

## 2021-08-09 RX ADMIN — TETRAKIS(2-METHOXYISOBUTYLISOCYANIDE)COPPER(I) TETRAFLUOROBORATE 35 MILLICURIE: 1 INJECTION, POWDER, LYOPHILIZED, FOR SOLUTION INTRAVENOUS at 09:35

## 2021-08-09 RX ADMIN — ISOSORBIDE MONONITRATE 60 MG: 60 TABLET, EXTENDED RELEASE ORAL at 08:18

## 2021-08-09 RX ADMIN — ONDANSETRON 4 MG: 4 TABLET, ORALLY DISINTEGRATING ORAL at 13:37

## 2021-08-09 RX ADMIN — Medication 10 ML: at 08:20

## 2021-08-09 RX ADMIN — TETRAKIS(2-METHOXYISOBUTYLISOCYANIDE)COPPER(I) TETRAFLUOROBORATE 10.5 MILLICURIE: 1 INJECTION, POWDER, LYOPHILIZED, FOR SOLUTION INTRAVENOUS at 07:46

## 2021-08-09 RX ADMIN — SODIUM CHLORIDE, PRESERVATIVE FREE 10 ML: 5 INJECTION INTRAVENOUS at 07:46

## 2021-08-09 RX ADMIN — PANTOPRAZOLE SODIUM 40 MG: 40 TABLET, DELAYED RELEASE ORAL at 07:38

## 2021-08-09 RX ADMIN — ASPIRIN 81 MG: 81 TABLET, COATED ORAL at 13:37

## 2021-08-09 RX ADMIN — ENOXAPARIN SODIUM 40 MG: 40 INJECTION SUBCUTANEOUS at 07:38

## 2021-08-09 RX ADMIN — ACETAMINOPHEN 650 MG: 325 TABLET ORAL at 12:05

## 2021-08-09 RX ADMIN — PERFLUTREN 2.2 MG: 6.52 INJECTION, SUSPENSION INTRAVENOUS at 13:37

## 2021-08-09 RX ADMIN — REGADENOSON 0.4 MG: 0.08 INJECTION, SOLUTION INTRAVENOUS at 09:34

## 2021-08-09 RX ADMIN — AMLODIPINE BESYLATE 5 MG: 5 TABLET ORAL at 13:37

## 2021-08-09 RX ADMIN — SODIUM CHLORIDE, PRESERVATIVE FREE 10 ML: 5 INJECTION INTRAVENOUS at 09:09

## 2021-08-09 RX ADMIN — METOPROLOL TARTRATE 50 MG: 50 TABLET, FILM COATED ORAL at 13:37

## 2021-08-09 ASSESSMENT — ENCOUNTER SYMPTOMS
COUGH: 0
BLOOD IN STOOL: 0
TROUBLE SWALLOWING: 0
COLOR CHANGE: 0
SHORTNESS OF BREATH: 0
EYE ITCHING: 0
CHEST TIGHTNESS: 0
EYE REDNESS: 0
VOMITING: 0
ABDOMINAL PAIN: 0
NAUSEA: 0

## 2021-08-09 ASSESSMENT — PAIN SCALES - GENERAL
PAINLEVEL_OUTOF10: 0
PAINLEVEL_OUTOF10: 3
PAINLEVEL_OUTOF10: 0

## 2021-08-09 NOTE — PROGRESS NOTES
Pt. Discharged to home, via wheel chair, no c/o of pain or SOB at this time  Pt. Verbalized understanding of all discharge instructions and follow up appt's  Pt.  Discharged with family per private car, and wheel chair

## 2021-08-09 NOTE — PROGRESS NOTES
CST Lexiscan. Stress Tech performs patient preparation of physical comfort, review test procedures, pre-stress EKG. Lung Sounds clear t/o. Consent verified by pt. .  Educated patient on test procedure and possible side effects of Lexiscan as well as s/s to report. Cardiologist reviewed pre-test EKG and is present for test.  Patient tolerated test well with minor SOB and racing heart, both of which resolved to baseline after test with caffeine. Start /69 HR 75  Stop /70 HR 82  EKG portion of testing is completed and negative, nuc. med. portion is still pending.

## 2021-08-09 NOTE — PLAN OF CARE
Problem: Safety:  Goal: Free from accidental physical injury  Description: Free from accidental physical injury  8/9/2021 0318 by Rosaura Olivera RN  Outcome: Ongoing  Note: No falls noted this shift. Patient ambulates with x1 staff assistance without difficulty. Bed kept in low position. Safe environment maintained. Bedside table & call light in reach. Uses call light appropriately when needing assistance. Problem: Safety:  Goal: Free from intentional harm  Description: Free from intentional harm  8/9/2021 0318 by Rosaura Olivera RN  Outcome: Ongoing     Problem: Safety:  Goal: Free from intentional harm  Description: Free from intentional harm  8/9/2021 0318 by Rosaura Olivera RN  Outcome: Ongoing     Problem: Daily Care:  Goal: Daily care needs are met  Description: Daily care needs are met  8/9/2021 0318 by Rosaura Olivera RN  Outcome: Ongoing     Problem: Pain:  Goal: Patient's pain/discomfort is manageable  Description: Patient's pain/discomfort is manageable  8/9/2021 0318 by Rosaura Olivera RN  Outcome: Ongoing  Note: No pain at this time. 0/10 pain scale.

## 2021-08-09 NOTE — CARE COORDINATION
ONGOING DISCHARGE PLAN:    Patient is alert and oriented x4. Spoke with patient regarding discharge plan and patient confirms that plan is still to return to home. She declines the need for VNS services. Stress test today - If negative should be able to discharge. OH - 7%    Will continue to follow for additional discharge needs.     Electronically signed by Doris Hamm RN on 8/9/2021 at 2:43 PM

## 2021-08-09 NOTE — DISCHARGE SUMMARY
Discharge Summary      Patient ID: Rivka Pretty    MRN: [id number]     Acct:  [de-identified]       Patient's PCP: Angie Gomez MD    Admit Date: 8/8/2021     Discharge Date:   8/9/2021      Admitting Physician: Bert Miguel MD    Discharge Physician: Umm Jha MD     Discharge Diagnoses:    Primary Problem  Chest pain    Principal Problem:    Chest pain  Active Problems:    CAD (coronary artery disease)    Gastroesophageal reflux disease without esophagitis    HTN (hypertension)    Mixed hyperlipidemia    Class 3 severe obesity without serious comorbidity with body mass index (BMI) of 45.0 to 49.9 in adult [facility])  Resolved Problems:    * No resolved hospital problems. *    Past Medical History:   Diagnosis Date    Arthritis     Borderline diabetic     controlled with diet    CAD (coronary artery disease)     Constipation     GERD (gastroesophageal reflux disease)     Hyperlipidemia     Hypertension     Incisional hernia     Irregular heart beat     Noted 7-20-21    Mass of soft palate     Noted 7-20-21    Prolonged emergence from general anesthesia     Seasonal allergies     Short of breath on exertion     Wears glasses      The patient was seen and examined on day of discharge and this discharge summary is in conjunction with daily progress note from day of discharge. Code Status:  Full Code    Hospital Course:   H&P Reviewed. patient with a medical history of hypertension hyperlipidemia was admitted with chest pain. Cardiology services were consulted patient was started on aspirin. Patient cardiac stress test was unremarkable patient had a 2D echo during hospital stay patient's symptoms improved during hospital stay patient is being discharged in stable condition. Discharge day progress note: Today   Patient was seen and examined at bedside today. Hemodynamically stable.   No chest pain, shortness of breath, fever, chills, nausea, vomiting, palpitations, or abdominal pain reported. No events reported overnight. Review of Systems    Physical Exam    Consults:  IP CONSULT TO CARDIOLOGY    Significant Diagnostic Studies: as above, and as follows:    Treatments: as above    Disposition: home    Discharged Condition: Stable    Follow Up:  Paco Lees MD in one week   cardiology in 4 weeks    Discharge Medications:    Raffi Moody   Home Medication Instructions TKA:[id number]    Printed on:08/09/21 3392   Medication Information                      amLODIPine (NORVASC) 5 MG tablet  TAKE 1 TABLET BY MOUTH EVERY DAY             aspirin 325 MG tablet  Take 162.5 mg by mouth daily Takes 1/2 tablet of aspirin             b complex vitamins capsule  Take 1 capsule by mouth daily             esomeprazole Magnesium (NEXIUM) 20 MG PACK  Take 20 mg by mouth daily             Glucosamine-Chondroitin (GLUCOSAMINE CHONDR COMPLEX PO)  Take 2 capsules by mouth daily              ibuprofen (ADVIL;MOTRIN) 800 MG tablet  TAKE 1 TABLET BY MOUTH EVERY 6 HOURS AS NEEDED FOR PAIN             isosorbide mononitrate (IMDUR) 60 MG extended release tablet  TAKE 1 TABLET BY MOUTH EVERY DAY             lisinopril (PRINIVIL;ZESTRIL) 40 MG tablet  TAKE 1/2 TABLET BY MOUTH DAILY             magnesium hydroxide (MILK OF MAGNESIA) 400 MG/5ML suspension  Please follow instructions provided by physicians office             metoprolol tartrate (LOPRESSOR) 50 MG tablet  TAKE 1 TABLET BY MOUTH TWICE A DAY             Multiple Vitamins-Minerals (HAIR SKIN AND NAILS FORMULA PO)  Take 2 tablets by mouth daily             simvastatin (ZOCOR) 40 MG tablet  TAKE 1 TABLET BY MOUTH EVERY DAY EVERY NIGHT                          Time Spent on discharge is more than  35 min in the examination, evaluation, counseling and review of medications and discharge plan.       Electronically signed by Davida Mar MD     Copy sent to Dr. Paco Lees MD

## 2021-08-09 NOTE — PROCEDURES
[address] [facility]                    [address] [facility]. [address]                              CARDIAC STRESS TEST    PATIENT NAME: Darrin Curran                    :        1940  MED REC NO:   [id number]                              ROOM:       [id number]  ACCOUNT NO:   [de-identified]                           ADMIT DATE: 2021  PROVIDER:     Mera Knapp    DATE OF STUDY:  2021    TEST TYPE: LEXISCAN CARDIOLYTE STRESS TEST  INDICATION: CHEST PAIN  REFERRING PHYSICIAN: DR. [facility]    RESTING HEART RATE: 75 BEATS PER MINUTE  RESTING BLOOD PRESSURE: 148/69    MEDICATION(S) GIVEN: 0.4 MG IV LEXISCAN  REASON FOR TERMINATION: MEDICATION INFUSION COMPLETE    RESTING EKG: NORMAL  COMMENTS: NORMAL SINUS RHYTHM, HEART RATE 75 BEATS PER MINUTE  STRESS HEART RESPONSE: NORMAL RESPONSE  BLOOD PRESSURE RESPONSE: APPROPRIATE  STRESS EKGs: NORMAL  CHEST DISCOMFORT: NO PAIN  ISCHEMIC EKG CHANGES: NONE    EKG IMPRESSION: ELECTROCARDIOGRAPHICALLY NEGATIVE LEXISCAN STRESS TEST. RADIOISOTOPE RESULTS TO FOLLOW FROM THE DEPARTMENT OF NUCLEAR MEDICINE.         He Cannon    D: 2021 12:15:34       T: 2021 12:16:29     [id number]  Job#: [id number]     Doc#: Unknown    CC:    (Retain this field even if not dictated or not decipherable)

## 2021-08-09 NOTE — CONSULTS
[facility] PHYSICIANS CARDIOLOGY CONSULT NOTE      Date of Admission:  8/8/2021    Date of Consultation:  8/9/2021    PCP:  Paco Lees MD      REASON FOR CONSULT:  Chest pain. HISTORY OF PRESENT ILLNESS:  Mandy Brink is a [de-identified] y.o. female with prior history of essential hypertension, hyperlipidemia, borderline diabetes mellitus, no prior history of ASCAD or MI or heart failure or CVA or syncope. Patient is under lot of stress last 2 weeks since her 60-year-old older son passed away following repeat valve replacement surgery and CABG apparently. Starting Friday she has been experiencing mid substernal chest heaviness localized with the intermittent leg swelling and aching and pins and needles involving ankles to the knees. Hungry but felt nauseous. Headache. Also since last summer, experiencing shoulder A Kenneth. Since admission, chest heaviness is better than before but still comes and goes. Ruled out for myocardial infarction by serial cardiac enzymes with the high sensitivity troponin negative x 2 at least.      Please refer to admitting history and physical and other providers notes for further details. PMH:   has a past medical history of Arthritis, Borderline diabetic, CAD (coronary artery disease), Constipation, GERD (gastroesophageal reflux disease), Hyperlipidemia, Hypertension, Incisional hernia, Irregular heart beat, Mass of soft palate, Prolonged emergence from general anesthesia, Seasonal allergies, Short of breath on exertion, and Wears glasses. PSH:   has a past surgical history that includes Knee arthroscopy (Left); Neck surgery (2001); Rotator cuff repair (Right); Cholecystectomy; Carpal tunnel release (Bilateral); Hysterectomy (1979); ventral hernia repair (N/A, 04/05/2019); Eye surgery (Right); and Colonoscopy (N/A, 7/20/2021). Allergies:     Allergies   Allergen Reactions    Medrol [Methylprednisolone] Other (See Comments)     Unknown reaction, \"I don't remember. It just made me feel funny. \"        Home Meds:    Prior to Admission medications    Medication Sig Start Date End Date Taking?  Authorizing Provider   simvastatin (ZOCOR) 40 MG tablet TAKE 1 TABLET BY MOUTH EVERY DAY EVERY NIGHT 8/6/21  Yes Elizabeth Bell MD   metoprolol tartrate (LOPRESSOR) 50 MG tablet TAKE 1 TABLET BY MOUTH TWICE A DAY 8/6/21  Yes Elizabeth Bell MD   ibuprofen (ADVIL;MOTRIN) 800 MG tablet TAKE 1 TABLET BY MOUTH EVERY 6 HOURS AS NEEDED FOR PAIN 7/24/21  Yes Elizabeth Bell MD   magnesium hydroxide (MILK OF MAGNESIA) 400 MG/5ML suspension Please follow instructions provided by physicians office 7/12/21  Yes Tiffanie Nicholas MD   Multiple Vitamins-Minerals (HAIR SKIN AND NAILS FORMULA PO) Take 2 tablets by mouth daily   Yes Historical Provider, MD   amLODIPine (NORVASC) 5 MG tablet TAKE 1 TABLET BY MOUTH EVERY DAY 6/14/21  Yes Elizabeth Bell MD   isosorbide mononitrate (IMDUR) 60 MG extended release tablet TAKE 1 TABLET BY MOUTH EVERY DAY 5/3/21  Yes Elizabeth Bell MD   b complex vitamins capsule Take 1 capsule by mouth daily   Yes Historical Provider, MD   Glucosamine-Chondroitin (GLUCOSAMINE CHONDR COMPLEX PO) Take 2 capsules by mouth daily    Yes Historical Provider, MD   lisinopril (PRINIVIL;ZESTRIL) 40 MG tablet TAKE 1/2 TABLET BY MOUTH DAILY  Patient taking differently: Take 20 mg by mouth nightly TAKE 1/2 TABLET BY MOUTH DAILY 2/10/21  Yes Elizabeth Bell MD   esomeprazole Magnesium (NEXIUM) 20 MG PACK Take 20 mg by mouth daily   Yes Historical Provider, MD   aspirin 325 MG tablet Take 162.5 mg by mouth daily Takes 1/2 tablet of aspirin   Yes Historical Provider, MD        Hospital Meds:    Current Facility-Administered Medications   Medication Dose Route Frequency Provider Last Rate Last Admin    technetium sestamibi (CARDIOLITE) injection 10 millicurie  10 millicurie Intravenous ONCE PRN MD Brigido Cruz sodium chloride flush 0.9 % injection 10 mL  10 mL Intravenous PRN Brooke Fisher MD        nitroGLYCERIN (NITROSTAT) SL tablet 0.4 mg  0.4 mg Sublingual Q5 Min PRN Ramiro Vee DO        sodium chloride flush 0.9 % injection 5-40 mL  5-40 mL Intravenous 2 times per day David Mcneil MD   10 mL at 08/08/21 2029    sodium chloride flush 0.9 % injection 5-40 mL  5-40 mL Intravenous PRN David Mcneil MD        0.9 % sodium chloride infusion  25 mL Intravenous PRN David Mcneil MD        enoxaparin (LOVENOX) injection 40 mg  40 mg Subcutaneous Daily David Mcneil MD   40 mg at 08/08/21 1611    acetaminophen (TYLENOL) tablet 650 mg  650 mg Oral Q4H PRN David Mcneil MD        ondansetron (ZOFRAN-ODT) disintegrating tablet 4 mg  4 mg Oral Q8H PRN David Mcneil MD        Or    ondansetron (ZOFRAN) injection 4 mg  4 mg Intravenous Q6H PRN David Mcneil MD        amLODIPine (NORVASC) tablet 5 mg  5 mg Oral Daily Neva Mendoza MD   5 mg at 08/08/21 1956    isosorbide mononitrate (IMDUR) extended release tablet 60 mg  60 mg Oral Daily Kelsie Lundberg MD        lisinopril (PRINIVIL;ZESTRIL) tablet 20 mg  20 mg Oral Nightly Storm Mendoza MD   20 mg at 08/08/21 2028    metoprolol tartrate (LOPRESSOR) tablet 50 mg  50 mg Oral BID Neva Mendoza MD   50 mg at 08/08/21 2028    atorvastatin (LIPITOR) tablet 20 mg  20 mg Oral Daily Kelsie Lundberg MD   20 mg at 08/08/21 1956    pantoprazole (PROTONIX) tablet 40 mg  40 mg Oral QAM AC Kelsie Lundberg MD        regadenoson (LEXISCAN) injection 0.4 mg  0.4 mg Intravenous ONCE PRN Brooke Fisher MD        aspirin EC tablet 81 mg  81 mg Oral Daily Brooke Fisher MD           Social History:    Social History     Socioeconomic History    Marital status:       Spouse name: Not on file    Number of children: 11    Years of education: Not on file    Highest education level: 12th grade   Occupational History    Not on file Tobacco Use    Smoking status: Never Smoker    Smokeless tobacco: Never Used   Vaping Use    Vaping Use: Never used   Substance and Sexual Activity    Alcohol use: No     Alcohol/week: 0.0 standard drinks    Drug use: Never    Sexual activity: Not Currently   Other Topics Concern    Not on file   Social History Narrative    Not on file     Social Determinants of Health     Financial Resource Strain:     Difficulty of Paying Living Expenses:    Food Insecurity: No Food Insecurity    Worried About Running Out of Food in the Last Year: Never true    [place] of Food in the Last Year: Never true   Transportation Needs: No Transportation Needs    Lack of Transportation (Medical): No    Lack of Transportation (Non-Medical): No   Physical Activity: Sufficiently Active    Days of Exercise per Week: 6 days    Minutes of Exercise per Session: 30 min   Stress: No Stress Concern Present    Feeling of Stress : Not at all   Social Connections: Moderately Integrated    Frequency of Communication with Friends and Family: More than three times a week    Frequency of Social Gatherings with Friends and Family: Once a week    Attends [employer] Services: 1 to 4 times per year   [address] [place] [address] or Organizations: Yes    Attends Club or Organization Meetings: 1 to 4 times per year    Marital Status:    Intimate Partner Violence: Not At Risk    Fear of Current or Ex-Partner: No    Emotionally Abused: No    Physically Abused: No    Sexually Abused: No       Family History:    Family History   Problem Relation Age of Onset    Diabetes Mother     Heart Failure Mother     Heart Attack Father        Review of Systems:      · Constitutional: no weight loss or gain, no fever or chills. · Eyes: No new visual changes. · ENT: No new hearing loss. · Cardiovascular: see HPI. · Respiratory: No cough. No hemoptysis. · Gastrointestinal: No abdominal pain, no blood in stools.   · Genitourinary: No hematuria or increased frequency. · Musculoskeletal:  No new gait disturbance. · Integumentary: No rash or pruritis. · Neurological: No headache. No seizures or recent CVA/TIA. · Psychiatric: No anxiety or depression. · Hematologic/Lymphatic: No abnormal bruising or bleeding. · Allergic/Immunologic: No new allergies. Physical Exam   Vital Signs: /68   Pulse 66   Temp 97.7 °F (36.5 °C) (Oral)   Resp 16   Ht 5' 2\" (1.575 m)   Wt 250 lb (113.4 kg)   SpO2 95%   BMI 45.73 kg/m²        Admission Weight: 250 lb (113.4 kg)     General appearance: Awake, Alert, well developed, well nourished, pleasant. Cooperative. Laying in bed comfortably. Head: Normocephalic, atraumatic. Eyes:   Conjunctiva clear. Neck: no JVD, no carotid bruits, no thyromegaly. Chest: Clear bilaterally. No chest wall tenderness. No wheezing. Cardiac: Regular rate and rhythm, no S3 or S4 gallop, no significant audible murmur or rubs or clicks. Abdomen: Soft, non-tender. Extremities: no cyanosis or clubbing or leg edema. No calf tenderness. Pulses:  Bilaterally symmetrical and intact radial pulses. Neurologic:  Able to move all 4 extremities. No gross focal deficits. Skin:  No rashes. Warm and dry.       EKG:     Normal sinus rhythm   Normal ECG   When compared with ECG of 08-AUG-2021 04:35, (unconfirmed)   No significant change was found      Labs:      CBC:   Recent Labs     08/08/21  0502   WBC 7.1   HGB 12.9   HCT 38.7   MCV 90.4        BMP:   Recent Labs     08/08/21  0502      K 4.8      CO2 28   BUN 15   CREATININE 0.69     PT/INR:   Recent Labs     08/08/21  0502   PROTIME 12.9   INR 1.0     APTT:   Recent Labs     08/08/21  0502   APTT 34.8     D Dimer:   Recent Labs     08/08/21  1902   DDIMER 0.99*       Recent Results (from the past 24 hour(s))   Troponin    Collection Time: 08/08/21  7:02 PM   Result Value Ref Range    Troponin, High Sensitivity 7 0 - 14 ng/L Troponin T NOT REPORTED <0.03 ng/mL    Troponin Interp NOT REPORTED    D-Dimer Test    Collection Time: 08/08/21  7:02 PM   Result Value Ref Range    D-Dimer, Quant 0.99 (H) 0.00 - 0.59 mg/L FEU   EKG 12 Lead    Collection Time: 08/09/21  4:42 AM   Result Value Ref Range    Ventricular Rate 67 BPM    Atrial Rate 67 BPM    P-R Interval 188 ms    QRS Duration 90 ms    Q-T Interval 408 ms    QTc Calculation (Bazett) 431 ms    P Axis 25 degrees    R Axis -22 degrees    T Axis 17 degrees   Lipid Panel    Collection Time: 08/09/21  5:53 AM   Result Value Ref Range    Cholesterol 143 <200 mg/dL    HDL 51 >40 mg/dL    LDL Cholesterol 62 0 - 130 mg/dL    Chol/HDL Ratio 2.8 <5    Triglycerides 150 (H) <150 mg/dL    VLDL NOT REPORTED 1 - 30 mg/dL   Troponin    Collection Time: 08/09/21  5:53 AM   Result Value Ref Range    Troponin, High Sensitivity 8 0 - 14 ng/L    Troponin T NOT REPORTED <0.03 ng/mL    Troponin Interp NOT REPORTED          2 D ECHOCARDIOGRAM:    Ordered. IMPRESSION:    Chest pain of uncertain etiology. Ruled out for myocardial infarction. Negative troponin serially. No acute EKG changes. Cannot exclude any underlying ASCAD given multiple cardiac risk factors and postmenopausal age. Essential hypertension. Hyperlipidemia. Borderline diabetes mellitus. Increased stress due to death of a 60-year-old son. Morbid obesity with BMI 45 range. Other problems as charted. REC/PLAN:      As ordered. Imdur 60 mg daily, metoprolol 50 mg b.i.d., amlodipine 5 mg daily, lisinopril with holding parameters, aspirin 81 mg daily, atorvastatin 20 mg daily, subcutaneous Lovenox, analgesics p.r.n., nitroglycerin sublingual p.r.n. chest pain. A 2D echocardiogram and Lexiscan nuclear stress test were ordered.       If no stress-induced myocardial ischemia, patient will be reassured and can be discharged to home with close outpatient monitoring and continued aggressive cardiac risk factor modification. On the other hand if stress test should reveal any significant stress-induced myocardial ischemia, patient will need diagnostic cardiac catheterization while in hospital to rule out any critical underlying revascularizable coronary artery disease. Conveyed my condolences to patient on the sudden demise of her oldest son who was only 62years of age and who was atretiredt  apparently. Discussed with the charge nurse. There were no family members available at bedside to discuss. Will follow. Thank you once again for your kind consultation. Electronically signed by Pratik Pizano MD, [facility]      Addendum: 6:12 PM:    Nuclear stress test :    Impression:     1. Thinning of the apical wall.  Limited exam as patient refused prone stress   imaging.  No definitive scintigraphic evidence for reversible ischemia or   infarct. 2. Left ventricular ejection fracture of greater than 70%. 3.  Please see report for EKG portion of the examination which will be   performed separately by physician from cardiology. Risk stratification:  Low risk      OK to discharge from cardiac stand point. Thanks. Pratik Pizano MD, [facility]      PLEASE NOTE:  This progress note was completed using a voice transcription system. Every effort was made to ensure accuracy. However, inadvertent computerized transcription errors may be present.

## 2021-08-09 NOTE — PROGRESS NOTES
Progress Note    8/9/2021   1:21 PM    Name:  Lucinda Maciel  MRN:    [id number]     Acct:     [de-identified]   Room:  2101/2101-01   Day: 1     Admit Date: 8/8/2021  4:23 AM  PCP: Michael Mendosa MD    Subjective:     C/C:   Chief Complaint   Patient presents with    Chest Pain     heaviness       Interval History: Status: not changed. Patient is seen after the nuc med cardiac stress test. Patient complains of mild chest discomfort. Denies shortness of breath cavitations nausea vomiting. Vital signs reviewed and stable. ROS:   all 10 systems reviewed and are negative except as noted    Review of Systems   Constitutional: Negative for chills and fatigue. HENT: Negative for drooling, mouth sores, sneezing and trouble swallowing. Eyes: Negative for redness and itching. Respiratory: Negative for cough, chest tightness and shortness of breath. Cardiovascular: Negative for chest pain, palpitations and leg swelling. Gastrointestinal: Negative for abdominal pain, blood in stool, nausea and vomiting. Endocrine: Negative for heat intolerance and polyphagia. Genitourinary: Negative for difficulty urinating, flank pain and pelvic pain. Musculoskeletal: Negative for arthralgias, joint swelling and neck stiffness. Skin: Negative for color change and pallor. Allergic/Immunologic: Negative for food allergies. Neurological: Negative for dizziness, seizures and headaches. Hematological: Does not bruise/bleed easily. Psychiatric/Behavioral: Negative for agitation, behavioral problems and suicidal ideas. The patient is not hyperactive. Medications: Allergies: Allergies   Allergen Reactions    Medrol [Methylprednisolone] Other (See Comments)     Unknown reaction, \"I don't remember. It just made me feel funny. \"       Current Meds: sodium chloride flush 0.9 % injection 5-40 mL, PRN  0.9 % sodium chloride infusion, Continuous PRN  albuterol sulfate  (90 Base) MCG/ACT inhaler 2 puff, PRN  atropine injection 0.5 mg, Q5 Min PRN  nitroGLYCERIN (NITROSTAT) SL tablet 0.4 mg, Q5 Min PRN  metoprolol (LOPRESSOR) injection 5 mg, Q5 Min PRN  aminophylline injection 50 mg, PRN  sodium chloride flush 0.9 % injection 10 mL, PRN  perflutren lipid microspheres (DEFINITY) injection 2.2 mg, ONCE PRN  enoxaparin (LOVENOX) injection 30 mg, BID  nitroGLYCERIN (NITROSTAT) SL tablet 0.4 mg, Q5 Min PRN  sodium chloride flush 0.9 % injection 5-40 mL, 2 times per day  sodium chloride flush 0.9 % injection 5-40 mL, PRN  0.9 % sodium chloride infusion, PRN  acetaminophen (TYLENOL) tablet 650 mg, Q4H PRN  ondansetron (ZOFRAN-ODT) disintegrating tablet 4 mg, Q8H PRN   Or  ondansetron (ZOFRAN) injection 4 mg, Q6H PRN  amLODIPine (NORVASC) tablet 5 mg, Daily  isosorbide mononitrate (IMDUR) extended release tablet 60 mg, Daily  lisinopril (PRINIVIL;ZESTRIL) tablet 20 mg, Nightly  metoprolol tartrate (LOPRESSOR) tablet 50 mg, BID  atorvastatin (LIPITOR) tablet 20 mg, Daily  pantoprazole (PROTONIX) tablet 40 mg, QAM AC  aspirin EC tablet 81 mg, Daily        Data:     Code Status:  Full Code    Family History   Problem Relation Age of Onset    Diabetes Mother     Heart Failure Mother     Heart Attack Father        Social History     Socioeconomic History    Marital status:       Spouse name: Not on file    Number of children: 11    Years of education: Not on file    Highest education level: 12th grade   Occupational History    Not on file   Tobacco Use    Smoking status: Never Smoker    Smokeless tobacco: Never Used   Vaping Use    Vaping Use: Never used   Substance and Sexual Activity    Alcohol use: No     Alcohol/week: 0.0 standard drinks    Drug use: Never    Sexual activity: Not Currently   Other Topics Concern    Not on file   Social History Narrative    Not on file     Social Determinants of Health     Financial Resource Strain:     Difficulty of Paying Living Expenses:    Food Insecurity: No Food Insecurity    Worried About Running Out of Food in the Last Year: Never true    [place] of Food in the Last Year: Never true   Transportation Needs: No Transportation Needs    Lack of Transportation (Medical): No    Lack of Transportation (Non-Medical): No   Physical Activity: Sufficiently Active    Days of Exercise per Week: 6 days    Minutes of Exercise per Session: 30 min   Stress: No Stress Concern Present    Feeling of Stress : Not at all   Social Connections: Moderately Integrated    Frequency of Communication with Friends and Family: More than three times a week    Frequency of Social Gatherings with Friends and Family: Once a week    Attends Anabaptism Services: 1 to 4 times per year   [address] [facility] [employer] or Organizations: Yes    Attends Club or Organization Meetings: 1 to 4 times per year    Marital Status:    Intimate Partner Violence: Not At Risk    Fear of Current or Ex-Partner: No    Emotionally Abused: No    Physically Abused: No    Sexually Abused: No       I/O (24Hr): Intake/Output Summary (Last 24 hours) at 8/9/2021 1321  Last data filed at 8/9/2021 0612  Gross per 24 hour   Intake 480 ml   Output 1175 ml   Net -695 ml     Radiology:  XR CHEST PORTABLE    Result Date: 8/8/2021  Mild-to-moderate cardiomegaly.  Otherwise, unremarkable single upright portable AP view of the chest.       Labs:  Recent Results (from the past 24 hour(s))   Troponin    Collection Time: 08/08/21  7:02 PM   Result Value Ref Range    Troponin, High Sensitivity 7 0 - 14 ng/L    Troponin T NOT REPORTED <0.03 ng/mL    Troponin Interp NOT REPORTED    D-Dimer Test    Collection Time: 08/08/21  7:02 PM   Result Value Ref Range    D-Dimer, Quant 0.99 (H) 0.00 - 0.59 mg/L U   EKG 12 Lead    Collection Time: 08/09/21  4:42 AM   Result Value Ref Range    Ventricular Rate 67 BPM    Atrial Rate 67 BPM    P-R Interval 188 ms    QRS Duration 90 ms    Q-T Interval 408 ms    QTc Calculation (Bazett) 431 ms P Axis 25 degrees    R Axis -22 degrees    T Axis 17 degrees   Lipid Panel    Collection Time: 21  5:53 AM   Result Value Ref Range    Cholesterol 143 <200 mg/dL    HDL 51 >40 mg/dL    LDL Cholesterol 62 0 - 130 mg/dL    Chol/HDL Ratio 2.8 <5    Triglycerides 150 (H) <150 mg/dL    VLDL NOT REPORTED 1 - 30 mg/dL   Troponin    Collection Time: 21  5:53 AM   Result Value Ref Range    Troponin, High Sensitivity 8 0 - 14 ng/L    Troponin T NOT REPORTED <0.03 ng/mL    Troponin Interp NOT REPORTED        Physical Examination:        Vitals:  /66   Pulse 76   Temp 97.5 °F (36.4 °C) (Axillary)   Resp 12   Ht 5' 2\" (1.575 m)   Wt 250 lb (113.4 kg)   SpO2 97%   BMI 45.73 kg/m²   Temp (24hrs), Av.9 °F (36.6 °C), Min:97.5 °F (36.4 °C), Max:98.4 °F (36.9 °C)    No results for input(s): POCGLU in the last 72 hours. Physical Exam  Vitals reviewed. Constitutional:       Appearance: She is obese. HENT:      Head: Normocephalic. Right Ear: External ear normal.      Left Ear: External ear normal.      Nose: Nose normal.      Mouth/Throat:      Mouth: Mucous membranes are moist.      Pharynx: Oropharynx is clear. Eyes:      Conjunctiva/sclera: Conjunctivae normal.   Cardiovascular:      Rate and Rhythm: Normal rate and regular rhythm. Pulses: Normal pulses. Heart sounds: Normal heart sounds. Pulmonary:      Effort: Pulmonary effort is normal.      Breath sounds: Normal breath sounds. Abdominal:      General: Bowel sounds are normal.      Palpations: Abdomen is soft. Musculoskeletal:         General: No deformity. Cervical back: Normal range of motion and neck supple. Right lower leg: No edema. Left lower leg: No edema. Skin:     General: Skin is warm. Capillary Refill: Capillary refill takes less than 2 seconds. Coloration: Skin is not jaundiced. Neurological:      General: No focal deficit present. Mental Status: She is alert.  Mental status is at baseline. Psychiatric:         Mood and Affect: Mood normal.         Behavior: Behavior normal.         Assessment:        Primary Problem  Chest pain     Principal Problem:    Chest pain  Active Problems:    CAD (coronary artery disease)    Gastroesophageal reflux disease without esophagitis    HTN (hypertension)    Mixed hyperlipidemia    Class 3 severe obesity without serious comorbidity with body mass index (BMI) of 45.0 to 49.9 in adult [facility])  Resolved Problems:    * No resolved hospital problems. *      Past Medical History:   Diagnosis Date    Arthritis     Borderline diabetic     controlled with diet    CAD (coronary artery disease)     Constipation     GERD (gastroesophageal reflux disease)     Hyperlipidemia     Hypertension     Incisional hernia     Irregular heart beat     Noted 7-20-21    Mass of soft palate     Noted 7-20-21    Prolonged emergence from general anesthesia     Seasonal allergies     Short of breath on exertion     Wears glasses         Plan:        1. Stress test today  2. Cardiology input noted  3. Cardiac telemetry  4. Aspirin 81 mg daily  5. Continue Imdur metoprolol and Lipitor on current dose  1. DVT Prophylaxis Lovenox subcu  2. EPCs  3. PT/OT to evaluate and treat  4. Pain control  5. Replace electrolytes as per sliding scale  6. Home medications reviewed and appropriate medications continued  7.  Reviewed labs and imaging studies from last 24 hours and results explained to patient      Electronically signed by Francy Lipscomb MD

## 2021-08-09 NOTE — DISCHARGE INSTR - COC
Continuity of Care Form    Patient Name: Isi Meyers   :    MRN:  [id number]    Admit date:  2021  Discharge date:  ***    Code Status Order: Full Code   Advance Directives:     Admitting Physician:  Kristan Pelaez MD  PCP: Lucinda Alvarez MD    Discharging Nurse: [facility] Unit/Room#: 2101/210-01  Discharging Unit Phone Number: ***    Emergency Contact:   Extended Emergency Contact Information  Primary Emergency Contact: Demario Alarcon  Address:  [place], [place]   Home Phone: [phone]  Relation: Legal Guardian  Secondary Emergency Contact: Tran [address] Phone: [phone]  Relation: Child    Past Surgical History:  Past Surgical History:   Procedure Laterality Date    CARPAL TUNNEL RELEASE Bilateral     2 times on the right and 2 times on the left    CHOLECYSTECTOMY      COLONOSCOPY N/A 2021    COLONOSCOPY DIAGNOSTIC performed by Antoinette Hogan MD at [address] [facility], operated by [facility] Right     Procedure done to right eye   [address] [facility]    \"they took part of the uterus.   I still have periods but they were every other month\", doesn't know why- no longer having menses as of 21    KNEE ARTHROSCOPY Left     NECK SURGERY      titanium screw in neck by Dr. Ilana Paredes N/A 2019    OPEN HERNIA INCISIONAL REPAIR W/MESH performed by Saman Kelly MD at [address]       Immunization History:   Immunization History   Administered Date(s) Administered    COVID-19, Pfizer, PF, 30mcg/0.3mL 2021, 2021       Active Problems:  Patient Active Problem List   Diagnosis Code    CAD (coronary artery disease) I25.10    Gastroesophageal reflux disease without esophagitis K21.9    HTN (hypertension) I10    Hypercholesteremia E78.00    Osteoarthritis of right glenohumeral joint M19.011    Chest pain R07.9    Mixed hyperlipidemia E78.2    Class 3 severe obesity without serious comorbidity with body mass index (BMI) of 45.0 to 49.9 in adult ([facility]) E66.01, Z68.42       Isolation/Infection:   Isolation          No Isolation        Patient Infection Status     None to display          Nurse Assessment:  Last Vital Signs: /66   Pulse 76   Temp 97.5 °F (36.4 °C) (Axillary)   Resp 12   Ht 5' 2\" (1.575 m)   Wt 250 lb (113.4 kg)   SpO2 97%   BMI 45.73 kg/m²     Last documented pain score (0-10 scale): Pain Level: 0  Last Weight:   Wt Readings from Last 1 Encounters:   21 250 lb (113.4 kg)     Mental Status:  {IP PT MENTAL STATUS:}    IV Access:  { ELO IV ACCESS:889502275}    Nursing Mobility/ADLs:  Walking   {CHP DME QHCC:353253460}  Transfer  {CHP DME FXJO:370512690}  Bathing  {CHP DME WWQC:846076692}  Dressing  {CHP DME AUOP:106281471}  Toileting  {CHP DME TCVJ:989237720}  Feeding  {P DME OPBP:339477519}  Med Admin  {P DME ZXFQ:694264021}  Med Delivery   { ELO MED Delivery:065050690}    Wound Care Documentation and Therapy:        Elimination:  Continence:   · Bowel: {YES / QO:80394}  · Bladder: {YES / I}  Urinary Catheter: {Urinary Catheter:766505926}   Colostomy/Ileostomy/Ileal Conduit: {YES / YJ:52109}       Date of Last BM: ***    Intake/Output Summary (Last 24 hours) at 2021 1731  Last data filed at 2021 1707  Gross per 24 hour   Intake 905 ml   Output 2350 ml   Net -1445 ml     I/O last 3 completed shifts:   In: 705 [P.O.:705]  Out: 1700 [Urine:1700]    Safety Concerns:     [employer] Safety Concerns:066094368}    Impairments/Disabilities:      [employer] Impairments/Disabilities:794641740}    Nutrition Therapy:  Current Nutrition Therapy:   [employer] Diet List:341937305}    Routes of Feeding: {CHP DME Other Feedings:707032799}  Liquids: {Slp liquid thickness:05726}  Daily Fluid Restriction: {CHP DME Yes amt example:687378193}  Last Modified Barium Swallow with Video (Video Swallowing Test): {Done Not Done [facility]:978508117}    Treatments at the Time of Hospital Discharge:   Respiratory Treatments: ***  Oxygen Therapy:  {Therapy; copd oxygen:37651}  Ventilator:    {[facility] Vent UUNE:993639976}    Rehab Therapies: {THERAPEUTIC INTERVENTION:5765932774}  Weight Bearing Status/Restrictions: {[facility] Weight Bearin}  Other Medical Equipment (for information only, NOT a DME order):  {EQUIPMENT:911356499}  Other Treatments: ***    Patient's personal belongings (please select all that are sent with patient):  {[facility] DME Belongings:498803170}    RN SIGNATURE:  {Esignature:458091938}    CASE MANAGEMENT/SOCIAL WORK SECTION    Inpatient Status Date: ***    Readmission Risk Assessment Score:  Readmission Risk              Risk of Unplanned Readmission:  7           Discharging to Facility/ Agency   · Name:   · Address:  · Phone:  · Fax:    Dialysis Facility (if applicable)   · Name:  · Address:  · Dialysis Schedule:  · Phone:  · Fax:    / signature: {Esignature:260172231}    PHYSICIAN SECTION    Prognosis: {Prognosis:5129153620}    Condition at Discharge: [facility] Patient Condition:792147140}    Rehab Potential (if transferring to Rehab): {Prognosis:9232140750}    Recommended Labs or Other Treatments After Discharge: ***    Physician Certification: I certify the above information and transfer of Chaitanya Hawley  is necessary for the continuing treatment of the diagnosis listed and that she requires {Admit to Appropriate Level of Care:22475} for {GREATER/LESS:880940377} 30 days.      Update Admission H&P: {P DME Changes in BAYEK:254976667}    PHYSICIAN SIGNATURE:  {Esignature:109025158}

## 2021-08-10 ENCOUNTER — CARE COORDINATION (OUTPATIENT)
Dept: CASE MANAGEMENT | Age: 81
End: 2021-08-10

## 2021-08-10 DIAGNOSIS — I25.9 CHEST PAIN DUE TO MYOCARDIAL ISCHEMIA, UNSPECIFIED ISCHEMIC CHEST PAIN TYPE: Primary | ICD-10-CM

## 2021-08-10 LAB
EKG ATRIAL RATE: 65 BPM
EKG ATRIAL RATE: 67 BPM
EKG P AXIS: 15 DEGREES
EKG P AXIS: 25 DEGREES
EKG P-R INTERVAL: 184 MS
EKG P-R INTERVAL: 188 MS
EKG Q-T INTERVAL: 410 MS
EKG Q-T INTERVAL: 416 MS
EKG QRS DURATION: 88 MS
EKG QRS DURATION: 88 MS
EKG QTC CALCULATION (BAZETT): 432 MS
EKG QTC CALCULATION (BAZETT): 433 MS
EKG R AXIS: -23 DEGREES
EKG R AXIS: -24 DEGREES
EKG T AXIS: 14 DEGREES
EKG T AXIS: 17 DEGREES
EKG VENTRICULAR RATE: 65 BPM
EKG VENTRICULAR RATE: 67 BPM

## 2021-08-10 PROCEDURE — 1111F DSCHRG MED/CURRENT MED MERGE: CPT | Performed by: FAMILY MEDICINE

## 2021-08-10 PROCEDURE — 93010 ELECTROCARDIOGRAM REPORT: CPT | Performed by: INTERNAL MEDICINE

## 2021-08-10 NOTE — CARE COORDINATION
Michel 45 Transitions Initial Follow Up Call    Call within 2 business days of discharge: Yes    Patient: Debbie Decker Patient : 1940   MRN: <[id number]>  Reason for Admission: chest pain  Discharge Date: 21 RARS: Readmission Risk Score: 7      Last Discharge [facility]       Complaint Diagnosis Description Type Department Provider    21 Chest Pain Chest pain, unspecified type ED to Hosp-Admission (Discharged) (ADMITTED) JAZZ Valentin MD; María Elena Stuart. .. Spoke with: Lukasz Almanza introduced to role of CTN. Patient states she is doing as well as she can. She proceeded to tell me she lost her son and buried him the day before her 81th birthday last week. Patient denies chest pain, SOB, dizziness, nausea, vomiting and palpitaitons. She is eating ok does not have appetite she does drink 1 boost daily. Drinking fluids well. Denies bowel and bladder problems. Offered LSW which was declined. Medication was reviewed and taking as directed. 1111 f complete. Patient has no concerns at present time. Facility: [facility]    Non-face-to-face services provided:  Obtained and reviewed discharge summary and/or continuity of care documents  Transitions of Care Initial Call    Was this an external facility discharge? No     Challenges to be reviewed by the provider   Additional needs identified to be addressed with provider: No  none             Method of communication with provider : none      Advance Care Planning:   Does patient have an Advance Directive: reviewed and current, reviewed and needs to be updated, not on file; education provided, patient declined education, decision maker updated and referral to internal ACP facilitator. Was this a readmission?  No  Patient stated reason for admission: chest pain  Patients top risk factors for readmission: depression, ineffective coping, medication management and stages of grief    Care Transition Nurse (CTN) contacted the patient by telephone to perform post hospital discharge assessment. Verified name and  with patient as identifiers. Provided introduction to self, and explanation of the CTN role. CTN reviewed discharge instructions, medical action plan and red flags with patient who verbalized understanding. Patient given an opportunity to ask questions and does not have any further questions or concerns at this time. Were discharge instructions available to patient? Yes. Reviewed appropriate site of care based on symptoms and resources available to patient including: PCP, Specialist and When to call 911. The patient agrees to contact the PCP office for questions related to their healthcare. Medication reconciliation was performed with patient, who verbalizes understanding of administration of home medications. Advised obtaining a 90-day supply of all daily and as-needed medications. Covid Risk Education     Educated patient about risk for severe COVID-19 due to risk factors according to CDC guidelines. LPN CC reviewed discharge instructions, medical action plan and red flag symptoms with the patient who verbalized understanding. Discussed COVID vaccination status: Yes. Education provided on COVID-19 vaccination as appropriate. Discussed exposure protocols and quarantine with CDC Guidelines. Patient was given an opportunity to verbalize any questions and concerns and agrees to contact LPN CC or health care provider for questions related to their healthcare. Reviewed and educated patient on any new and changed medications related to discharge diagnosis. Was patient discharged with a pulse oximeter? No Discussed and confirmed pulse oximeter discharge instructions and when to notify provider or seek emergency care. LPN CC provided contact information. Plan for follow-up call in 3-5 days based on severity of symptoms and risk factors.   Plan for next call: follow up appointment-needs 2 made declined [facility]      Care Transitions 24 Hour Call    Schedule Follow Up Appointment with PCP: Declined  Do you have any ongoing symptoms?: No  Do you have a copy of your discharge instructions?: Yes  Do you have all of your prescriptions and are they filled?: Yes  Have you been contacted by a [facility] Pharmacist?: No  Have you scheduled your follow up appointment?: No (Comment: will call today)  Were you discharged with any Home Care or Post Acute Services: No  Do you feel like you have everything you need to keep you well at home?: Yes  Care Transitions Interventions         Follow Up  Future Appointments   Date Time Provider Chelsie Kate   8/19/2021  1:00 PM Angelo Abbott  Doreen Pappas LPN

## 2021-08-17 ENCOUNTER — CARE COORDINATION (OUTPATIENT)
Dept: CASE MANAGEMENT | Age: 81
End: 2021-08-17
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
## 2021-08-19 ENCOUNTER — OFFICE VISIT (OUTPATIENT)
Dept: GASTROENTEROLOGY | Age: 81
End: 2021-08-19
Payer: MEDICARE

## 2021-08-19 VITALS
BODY MASS INDEX: 45.49 KG/M2 | HEART RATE: 80 BPM | SYSTOLIC BLOOD PRESSURE: 147 MMHG | TEMPERATURE: 97.1 F | WEIGHT: 247.2 LBS | OXYGEN SATURATION: 95 % | HEIGHT: 62 IN | DIASTOLIC BLOOD PRESSURE: 77 MMHG

## 2021-08-19 DIAGNOSIS — K21.9 GASTROESOPHAGEAL REFLUX DISEASE WITHOUT ESOPHAGITIS: ICD-10-CM

## 2021-08-19 DIAGNOSIS — K57.90 DIVERTICULOSIS: ICD-10-CM

## 2021-08-19 DIAGNOSIS — K59.00 CONSTIPATION, UNSPECIFIED CONSTIPATION TYPE: Primary | ICD-10-CM

## 2021-08-19 PROCEDURE — 4040F PNEUMOC VAC/ADMIN/RCVD: CPT | Performed by: INTERNAL MEDICINE

## 2021-08-19 PROCEDURE — 1036F TOBACCO NON-USER: CPT | Performed by: INTERNAL MEDICINE

## 2021-08-19 PROCEDURE — G8417 CALC BMI ABV UP PARAM F/U: HCPCS | Performed by: INTERNAL MEDICINE

## 2021-08-19 PROCEDURE — APPSS30 APP SPLIT SHARED TIME 16-30 MINUTES: Performed by: NURSE PRACTITIONER

## 2021-08-19 PROCEDURE — G8399 PT W/DXA RESULTS DOCUMENT: HCPCS | Performed by: INTERNAL MEDICINE

## 2021-08-19 PROCEDURE — 1111F DSCHRG MED/CURRENT MED MERGE: CPT | Performed by: INTERNAL MEDICINE

## 2021-08-19 PROCEDURE — 99213 OFFICE O/P EST LOW 20 MIN: CPT | Performed by: INTERNAL MEDICINE

## 2021-08-19 PROCEDURE — 1090F PRES/ABSN URINE INCON ASSESS: CPT | Performed by: INTERNAL MEDICINE

## 2021-08-19 PROCEDURE — 1123F ACP DISCUSS/DSCN MKR DOCD: CPT | Performed by: INTERNAL MEDICINE

## 2021-08-19 PROCEDURE — G8427 DOCREV CUR MEDS BY ELIG CLIN: HCPCS | Performed by: INTERNAL MEDICINE

## 2021-08-19 ASSESSMENT — ENCOUNTER SYMPTOMS
SHORTNESS OF BREATH: 0
ABDOMINAL DISTENTION: 0
ANAL BLEEDING: 0
RECTAL PAIN: 0
NAUSEA: 0
BLOOD IN STOOL: 0
CONSTIPATION: 0
CHOKING: 0
TROUBLE SWALLOWING: 0
COUGH: 0
SORE THROAT: 0
VOMITING: 0
VOICE CHANGE: 0
DIARRHEA: 0
BACK PAIN: 0
ABDOMINAL PAIN: 0

## 2021-08-19 NOTE — PROGRESS NOTES
GI OFFICE FOLLOW UP    INTERVAL HISTORY:   No referring provider defined for this encounter. Chief Complaint   Patient presents with    Follow-up     Patient is here today to f/u on 7/20/21 colon       HISTORY OF PRESENT ILLNESS:     Patient being seen for follow-up colonoscopy. Fair prep. Noted to have diverticulosis. No lesions seen. No melena, hematochezia. On further discussion she has satisfactory bowel movements. Constipation resolved. Has bowel movements more or less on daily basis. GERD symptoms resolved. No dysphagia. Denies abdominal pain bloating etc.      Past Medical,Family, and Social History reviewed and does contribute to the patient presenting condition. Patient's PMH/PSH,SH,PSYCH Hx, MEDs, ALLERGIES, and ROS were all reviewed and updated in the appropriate sections. Yes      PAST MEDICAL HISTORY:  Past Medical History:   Diagnosis Date    Arthritis     Borderline diabetic     controlled with diet    CAD (coronary artery disease)     Constipation     GERD (gastroesophageal reflux disease)     Hyperlipidemia     Hypertension     Incisional hernia     Irregular heart beat     Noted 7-20-21    Mass of soft palate     Noted 7-20-21    Prolonged emergence from general anesthesia     Seasonal allergies     Short of breath on exertion     Wears glasses        Past Surgical History:   Procedure Laterality Date    CARPAL TUNNEL RELEASE Bilateral     2 times on the right and 2 times on the left    CHOLECYSTECTOMY      COLONOSCOPY N/A 7/20/2021    COLONOSCOPY DIAGNOSTIC performed by Shoaib Hinson MD at [address] [facility] ([place]     Procedure done to right eye   Critical access hospital    \"they took part of the uterus.   I still have periods but they were every other month\", doesn't know why- no longer having menses as of 7-20-21    KNEE ARTHROSCOPY Left     NECK SURGERY  2001    titanium screw in neck by Dr. Any Lynn N/A 04/05/2019    OPEN HERNIA INCISIONAL REPAIR W/MESH performed by Adriana Rivera MD at [address] [facility] [address]:    Current Outpatient Medications:     simvastatin (ZOCOR) 40 MG tablet, TAKE 1 TABLET BY MOUTH EVERY DAY EVERY NIGHT, Disp: 90 tablet, Rfl: 0    metoprolol tartrate (LOPRESSOR) 50 MG tablet, TAKE 1 TABLET BY MOUTH TWICE A DAY, Disp: 180 tablet, Rfl: 0    magnesium hydroxide (MILK OF MAGNESIA) 400 MG/5ML suspension, Please follow instructions provided by physicians office, Disp: 1 Bottle, Rfl: 0    Multiple Vitamins-Minerals (HAIR SKIN AND NAILS FORMULA PO), Take 2 tablets by mouth daily, Disp: , Rfl:     amLODIPine (NORVASC) 5 MG tablet, TAKE 1 TABLET BY MOUTH EVERY DAY, Disp: 90 tablet, Rfl: 0    isosorbide mononitrate (IMDUR) 60 MG extended release tablet, TAKE 1 TABLET BY MOUTH EVERY DAY, Disp: 90 tablet, Rfl: 0    b complex vitamins capsule, Take 1 capsule by mouth daily, Disp: , Rfl:     Glucosamine-Chondroitin (GLUCOSAMINE CHONDR COMPLEX PO), Take 2 capsules by mouth daily , Disp: , Rfl:     lisinopril (PRINIVIL;ZESTRIL) 40 MG tablet, TAKE 1/2 TABLET BY MOUTH DAILY (Patient taking differently: Take 20 mg by mouth nightly TAKE 1/2 TABLET BY MOUTH DAILY), Disp: 45 tablet, Rfl: 0    esomeprazole Magnesium (NEXIUM) 20 MG PACK, Take 20 mg by mouth daily, Disp: , Rfl:     aspirin 325 MG tablet, Take 162.5 mg by mouth daily Takes 1/2 tablet of aspirin, Disp: , Rfl:     ibuprofen (ADVIL;MOTRIN) 800 MG tablet, TAKE 1 TABLET BY MOUTH EVERY 6 HOURS AS NEEDED FOR PAIN (Patient not taking: Reported on 8/10/2021), Disp: 120 tablet, Rfl: 0    ALLERGIES:   Allergies   Allergen Reactions    Medrol [Methylprednisolone] Other (See Comments)     Unknown reaction, \"I don't remember. It just made me feel funny. \"       FAMILY HISTORY:       Problem Relation Age of Onset  Diabetes Mother     Heart Failure Mother     Heart Attack Father          SOCIAL HISTORY:   Social History     Socioeconomic History    Marital status:      Spouse name: Not on file    Number of children: 11    Years of education: Not on file    Highest education level: 12th grade   Occupational History    Not on file   Tobacco Use    Smoking status: Never Smoker    Smokeless tobacco: Never Used   Vaping Use    Vaping Use: Never used   Substance and Sexual Activity    Alcohol use: No     Alcohol/week: 0.0 standard drinks    Drug use: Never    Sexual activity: Not Currently   Other Topics Concern    Not on file   Social History Narrative    Not on file     Social Determinants of Health     Financial Resource Strain:     Difficulty of Paying Living Expenses:    Food Insecurity: No Food Insecurity    Worried About Running Out of Food in the Last Year: Never true    [place] of Food in the Last Year: Never true   Transportation Needs: No Transportation Needs    Lack of Transportation (Medical): No    Lack of Transportation (Non-Medical): No   Physical Activity: Sufficiently Active    Days of Exercise per Week: 6 days    Minutes of Exercise per Session: 30 min   Stress: No Stress Concern Present    Feeling of Stress : Not at all   Social Connections: Moderately Integrated    Frequency of Communication with Friends and Family: More than three times a week    Frequency of Social Gatherings with Friends and Family: Once a week    Attends [employer] Services: 1 to 4 times per year   [facility] or Organizations: Yes    Attends Club or Organization Meetings: 1 to 4 times per year    Marital Status:    Intimate Partner Violence: Not At Risk    Fear of Current or Ex-Partner: No    Emotionally Abused: No    Physically Abused: No    Sexually Abused: No         REVIEW OF SYSTEMS:         Review of Systems   Constitutional: Positive for fatigue.  Negative for appetite change and unexpected weight change. HENT: Negative for sore throat, trouble swallowing and voice change. Eyes: Positive for visual disturbance (glasses). Respiratory: Negative for cough, choking and shortness of breath. Cardiovascular: Negative for chest pain and leg swelling. Gastrointestinal: Negative for abdominal distention, abdominal pain, anal bleeding, blood in stool, constipation (all her life.  ), diarrhea, nausea, rectal pain and vomiting. Genitourinary: Negative for difficulty urinating. Musculoskeletal: Positive for myalgias. Negative for back pain and joint swelling. Neurological: Positive for numbness (in legs). Negative for dizziness, tremors, weakness, light-headedness and headaches. Hematological: Bruises/bleeds easily. Psychiatric/Behavioral: Positive for sleep disturbance. The patient is not nervous/anxious. PHYSICAL EXAMINATION:     Vital signs reviewed per the nursing documentation. BP (!) 147/77   Pulse 80   Temp 97.1 °F (36.2 °C)   Ht 5' 2\" (1.575 m)   Wt 247 lb 3.2 oz (112.1 kg)   SpO2 95%   BMI 45.21 kg/m²   Body mass index is 45.21 kg/m². Physical Exam  Constitutional:       Appearance: Normal appearance. Eyes:      General: No scleral icterus. Pupils: Pupils are equal, round, and reactive to light. Cardiovascular:      Rate and Rhythm: Normal rate and regular rhythm. Heart sounds: Normal heart sounds. Pulmonary:      Effort: Pulmonary effort is normal.      Breath sounds: Normal breath sounds. Abdominal:      General: Bowel sounds are normal. There is no distension. Palpations: Abdomen is soft. There is no mass. Tenderness: There is no abdominal tenderness. There is no guarding. Skin:     General: Skin is warm and dry. Coloration: Skin is not jaundiced. Neurological:      Mental Status: She is alert and oriented to person, place, and time. Mental status is at baseline.            LABORATORY DATA: Reviewed  Lab Results   Component Value Date    WBC 7.1 08/08/2021    HGB 12.9 08/08/2021    HCT 38.7 08/08/2021    MCV 90.4 08/08/2021     08/08/2021     08/08/2021    K 4.8 08/08/2021     08/08/2021    CO2 28 08/08/2021    BUN 15 08/08/2021    CREATININE 0.69 08/08/2021    LABPROT 7.1 02/05/2013    LABALBU 4.2 03/23/2021    BILITOT 0.32 03/23/2021    ALKPHOS 89 03/23/2021    AST 25 03/23/2021    ALT 18 03/23/2021    INR 1.0 08/08/2021         Lab Results   Component Value Date    RBC 4.28 08/08/2021    HGB 12.9 08/08/2021    MCV 90.4 08/08/2021    MCH 30.1 08/08/2021    MCHC 33.3 08/08/2021    RDW 13.8 08/08/2021    MPV 7.3 08/08/2021    BASOPCT 1 08/08/2021    LYMPHSABS 1.85 08/08/2021    MONOSABS 0.85 08/08/2021    NEUTROABS 3.76 08/08/2021    EOSABS 0.50 (H) 08/08/2021    BASOSABS 0.07 08/08/2021         DIAGNOSTIC TESTING:     ECHO Complete 2D W Doppler W Color    Result Date: 8/10/2021  [employer] Transthoracic Echocardiography Report (TTE)  Patient Name Som Sullivan     Date of Study               08/09/2021               Kathlean Cooks   Date of      1940  Gender                      Female  Birth   Age          [de-identified] year(s)  Race                           Room Number  2101        Height:                     62 inch, 157.48 cm   Corporate ID [id number]    Weight:                     250 pounds, 113.4 kg  #   Patient Acct [de-identified]   BSA:          2.1 m^2       BMI:      45.73  #                                                              kg/m^2   MR #         [id number]      Sonographer                 Stephanie Russell   Accession #  [id number]  Interpreting Physician      Bo Marie   Fellow                   Referring Nurse                           Practitioner   Interpreting             Referring Physician         Schuyler Mancilla  Type of Study   TTE procedure:2D Echocardiogram, M-Mode, Doppler, Color Doppler, Contrast  study.   Procedure Date Date: 08/09/2021 Start: 01:12 PM Study Location: [address] [place] [address] Technical Quality: Limited visualization Indications:Chest pain and Congestive heart failure. History / Tech. Comments: CAD, HTN, HLD Patient Status: Inpatient Contrast Medium: Definity. Amount - 2 ml Height: 62 inches Weight: 250.01 pounds BSA: 2.1 m^2 BMI: 45.73 kg/m^2 Rhythm: Within normal limits HR: 72 bpm BP: 141/64 mmHg Allergies   - *No Known Allergies. CONCLUSIONS Summary Contrast was utilized on this technically difficult study. Left ventricle is normal in size and wall thickness. Global left ventricular systolic function is normal. Estimated LV EF 60-65 %. No obvious wall motion abnormality seen. Left atrium is normal in size. Normal RV size and function No significant valvular regurgitation or stenosis seen. No significant pericardial effusion is seen. Normal aortic root dimension. IVC not well visulized Signature ----------------------------------------------------------------------------  Electronically signed by Bo Marie(Interpreting physician) on  08/10/2021 09:07 AM ---------------------------------------------------------------------------- ----------------------------------------------------------------------------  Electronically signed by Stephanie Russell(Sonographer) on 08/09/2021 03:43  PM ---------------------------------------------------------------------------- FINDINGS Left Atrium Left atrium is normal in size. Left Ventricle Left ventricle is normal in size and wall thickness. Global left ventricular systolic function is normal. Estimated LV EF 60-65 %. No obvious wall motion abnormality seen. Right Atrium Right atrium was not well visualized. Right Ventricle Right ventricle normal size and function Mitral Valve No obvious valvular abnormality seen. No evidence of mitral regurgitation. Aortic Valve Aortic valve was not well visualized. No evidence of aortic insufficiency or stenosis. Tricuspid Valve Tricuspid valve was not well visualized. Insignificant tricuspid regurgitation, unable to estimate RVSP. Pulmonic Valve Pulmonic valve was not well visualized. Trivial pulmonic insufficiency. Pericardial Effusion No significant pericardial effusion is seen. Pleural Effusion No pleural effusion seen. Miscellaneous Normal aortic root dimension. M-mode / 2D Measurements & Calculations:   LVIDd:4.42 cm(3.7 - 5.6 cm)      Diastolic YKGCAT:73.4 ml  BITBI:4.38 cm(2.2 - 4.0 cm)      Systolic JCGLRK:32.4 ml  FVRI:3.91 cm(0.6 - 1.1 cm)       Aortic Root:3.4 cm(2.0 - 3.7 cm)  LVPWd:1.05 cm(0.6 - 1.1 cm)      LA Dimension: 3.6 cm(1.9 - 4.0 cm)  Fractional Shortenin.4 %     LA volume/Index: 43.3 ml /21m^2  Calculated LVEF (%): 79.86 %     LVOT:1.8 cm   Mitral:                             Aortic   Peak E-Wave: 0.70 m/s               Peak Velocity: 1.26 m/s  Peak A-Wave: 0.99 m/s               Mean Velocity: 0.83 m/s  E/A Ratio: 0.71                     Peak Gradient: 6.35 mmHg  Peak Gradient: 1.95 mmHg            Mean Gradient: 3 mmHg  Mean Gradient: 2 mmHg  Deceleration Time: 250 msec                                      Area (continuity): 2.18 cm^2                                      AV VTI: 25 cm   Area (continuity): 1.6 cm^2  Mean Velocity: 0.59 m/s  Septal Wall E' velocity:0.06 m/s Lateral Wall E' velocity:0.05 m/s    XR CHEST PORTABLE    Result Date: 2021  EXAMINATION: ONE XRAY VIEW OF THE CHEST 2021 4:32 am COMPARISON: Chest two views 2015. HISTORY: ORDERING SYSTEM PROVIDED HISTORY: pain TECHNOLOGIST PROVIDED HISTORY: pain Reason for Exam: pain Acuity: Acute Type of Exam: Initial FINDINGS: The heart is mildly to moderately enlarged with otherwise unremarkable configuration. The mediastinal contours are within normal limits. The lungs are well aerated. The pleural surfaces are normal and no evidence of a pleural effusion is seen. Bones and soft tissues are unremarkable. Mild-to-moderate cardiomegaly.  Otherwise, unremarkable single upright portable AP view of the chest.     NM Cardiac Stress Test Nuclear Imaging    Result Date: 8/9/2021  EXAMINATION: MYOCARDIAL PERFUSION IMAGING 8/9/2021 7:47 am TECHNIQUE: Rest dose:  10.5 mCi Tc-99m sestamibi intravenously Stress dose:  35 mCi Tc-99m sestamibi intravenously Under cardiology supervision, 0.4 mg Lexiscan was infused intravenously prior to injection of the stress dose. SPECT imaging was acquired following injection of the sestamibi. ECG gating was obtained following the stress acquisition. COMPARISON: 03/02/2015 HISTORY: ORDERING SYSTEM PROVIDED HISTORY: Chest pain TECHNOLOGIST PROVIDED HISTORY: Reason for Exam: Chest pain Procedure Type->Rx Chest pain Reason for Exam: CP Acuity: Unknown Type of Exam: Unknown 67-year-old female with chest pain FINDINGS: The patient achieved a maximum heart rate of 94 beats per minute, 67 % of the maximum age predicted heart rate of 140 beats per minute. Perfusion: Exam is limited as patient refused prone stress imaging. Thinning of the apical wall. There is no scintigraphic evidence for a reversible or fixed perfusion defect to suggest reversible ischemia or infarct. Function: The gated SPECT data demonstrates normal left ventricular size and normal wall motion. Left ventricular ejection fraction:  Greater than 70% TID score:  1.12 (threshold value of 1.39 is used for Lexiscan stress with Tc-99m). There is no stress-induced cavitary dilatation to suggest compensated triple vessel disease. End diastolic volume:  61AU Scores are visually adjusted to account for potential artifact. Summed stress score:  0 Summed rest score:  0 Summed reversibility score:  0     1. Thinning of the apical wall. Limited exam as patient refused prone stress imaging. No definitive scintigraphic evidence for reversible ischemia or infarct. 2. Left ventricular ejection fracture of greater than 70%.  3.  Please see report for EKG portion of the examination which will be performed separately by physician from cardiology. Risk stratification:  Low risk Note:  Risk stratification incorporates both clinical history and test results. Final risk determination is the responsibility of the ordering provider as history and other test results may increase or decrease the risk stratification reported for this examination. Risk stratification criteria are adapted from \"Noninvasive Risk Stratification\" criteria from Adryan Sanabria. Al, ACC/AATS/AHA/ASE/ASNC/SCAI/SCCT/STS 2017 Appropriate Use Criteria For Coronary Revascularization in Patients With Stable Ischemic Heart Disease [facility] Volume 69, Issue 17, May 2017 High risk (>3% annual death or MI) 1. Severe resting LV dysfunction (LVEF >35%) not readily explained by non coronary causes 2. Resting perfusion abnormalities greater than 10% of the myocardium in patients without prior history or evidence of MI 3. Stress-induced perfusion abnormalities encumbering greater than or equal to 10% myocardium or stress segmental scores indicating multiple vascular territories with abnormalities 4. Stress-induced LV dilatation (TID ratio greater than 1.19 for exercise and greater than 1.39 for regadenoson) Intermediate risk (1% to 3% annual death or MI) 1. Mild/moderate resting LV dysfunction (LVEF 35% to 49%) not readily explained by non coronary causes. 2.  Resting perfusion abnormalities in 5%-9.9% of the myocardium in patients without a history or prior evidence of MI 3. Stress-induced perfusion abnormality encumbering 5%-9.9% of the myocardium or stress segmental scores indicating 1 vascular territory with abnormalities but without LV dilation 4. Small wall motion abnormality involving 1-2 segments and only 1 coronary bed. Low Risk (Less than 1% annual death or MI) 1. Normal or small myocardial perfusion defect at rest or with stress encumbering less than 5% of the myocardium. Assessment  1. Constipation, unspecified constipation type    2. Diverticulosis    3. Gastroesophageal reflux disease without esophagitis        Plan  Patient seen along with the APRN. At present she does not have GERD symptoms. Advised antireflux measures. Discussed with the patient regarding colonoscopy findings including diverticulosis. Regarding long-term management of diverticulosis including high-fiber diet, fiber supplements. At present by taking Metamucil patient is having satisfactory bowel movements on a daily basis. Advised to continue Metamucil. If she has constipation advised to take laxatives intermittently. Discussed with the patient regarding diverticulosis and long-term management. We will see her in 1 year. Sooner if she has any issues. Thank you for allowing me to participate in the care of Ms. Diamante Hoffmann. For any further questions please do not hesitate to contact me. I have reviewed and agree with the ROS entered by the MA/LPN. Note is dictated utilizing voice recognition software. Unfortunately this leads to occasional typographical errors.  Please contact our office if you have any questions        Nany Allan MD,FACP, [facility]  Board Certified in Gastroenterology and [address] [place] [address] Gastroenterology  Office #: [phone]

## 2021-08-24 ENCOUNTER — CARE COORDINATION (OUTPATIENT)
Dept: CASE MANAGEMENT | Age: 81
End: 2021-08-24

## 2021-08-24 NOTE — CARE COORDINATION
[facility] Transitions Follow Up Call    2021    Patient: Rakesh Albert  Patient : 1940   MRN: <[id number]>  Reason for Admission: chest pain  Discharge Date: 21 RARS: Readmission Risk Score: 7         Spoke with: Called to speak with patient for update with transition of care. Left HIPPA compliant voice message with contact information [phone] for a call  Back with an update. Care Transitions Subsequent and Final Call    Subsequent and Final Calls  Care Transitions Interventions  Other Interventions:            Follow Up  Future Appointments   Date Time Provider Chelsie Kate   2021  2:45 PM MD Ghada Manzanares MD None       Velasquez Horowitz LPN

## 2021-08-31 ENCOUNTER — CARE COORDINATION (OUTPATIENT)
Dept: CASE MANAGEMENT | Age: 81
End: 2021-08-31

## 2021-08-31 NOTE — CARE COORDINATION
[facility] Transitions Follow Up Call    2021    Patient: Kt Wheeler  Patient : 1940   MRN: <[id number]>  Reason for Admission: chest pain  Discharge Date: 21 RARS: Readmission Risk Score: 7         Spoke with: Called to speak with patient for update with transition of care. Left HIPPA compliant voice message with contact information [phone] for a call  Back with an update. Care Transitions Subsequent and Final Call    Subsequent and Final Calls  Care Transitions Interventions  Other Interventions:            Follow Up  Future Appointments   Date Time Provider Chelsie Kate   2021 10:00 AM MD Ghada Evans MD None       Lilo Nieves LPN

## 2021-09-08 ENCOUNTER — CARE COORDINATION (OUTPATIENT)
Dept: CASE MANAGEMENT | Age: 81
End: 2021-09-08

## 2021-09-08 NOTE — CARE COORDINATION
[facility] Transitions Follow Up Call    2021    Patient: Kt Wheeler  Patient : 1940   MRN: <[id number]>  Reason for Admission: chest pain  Discharge Date: 21 RARS: Readmission Risk Score: 7         Spoke with: [employer] she states she is doing well , denies chest pain and dizziness, has SOB at times no change or increase. Has no concerns and is fine with last call being made today. Care Transitions Follow Up Call    Needs to be reviewed by the provider   Additional needs identified to be addressed with provider: No  none             Method of communication with provider : none      Care Transition Nurse (CTN) contacted the patient by telephone to follow up after admission on 21. Verified name and  with patient as identifiers. Addressed changes since last contact: doing well no concerns  Discussed follow-up appointments. If no appointment was previously scheduled, appointment scheduling offered: Yes. Is follow up appointment scheduled within 7 days of discharge? Yes. Advance Care Planning:   Does patient have an Advance Directive: reviewed and current, reviewed and needs to be updated, not on file; education provided, patient declined education, decision maker updated and referral to internal ACP facilitator. CTN reviewed discharge instructions, medical action plan and red flags with patient and discussed any barriers to care and/or understanding of plan of care after discharge. Discussed appropriate site of care based on symptoms and resources available to patient including: When to call 911. The patient agrees to contact the PCP office for questions related to their healthcare. Patients top risk factors for readmission: lack of knowledge about disease  Interventions to address risk factors: Obtained and reviewed discharge summary and/or continuity of care documents      Non-[facility] follow up appointment(s): na    CTN provided contact information for future needs.  No further follow-up call indicated based on severity of symptoms and risk factors. Care Transitions Subsequent and Final Call    Subsequent and Final Calls  Do you have any ongoing symptoms?: No  Have your medications changed?: No  Do you have any questions related to your medications?: No  Do you currently have any active services?: No  Do you have any needs or concerns that I can assist you with?: No  Identified Barriers: Lack of Education  Care Transitions Interventions  Other Interventions:            Follow Up  Future Appointments   Date Time Provider Chelsie Kate   11/24/2021 10:00 AM MD Ghada Cuellar MD, LPN

## 2022-05-04 ENCOUNTER — CARE COORDINATION (OUTPATIENT)
Dept: CARE COORDINATION | Age: 82
End: 2022-05-04

## 2022-05-04 NOTE — CARE COORDINATION
Attempted to call to discuss care management enrollment, unable to leave a message, will call again next week. No future appointments.

## 2022-05-12 ENCOUNTER — CARE COORDINATION (OUTPATIENT)
Dept: CARE COORDINATION | Age: 82
End: 2022-05-12

## 2022-05-12 NOTE — CARE COORDINATION
Attempted to call for care management enrollment, unable to leave a message as no voicemail. Will attempt again next week.

## 2022-05-19 ENCOUNTER — CARE COORDINATION (OUTPATIENT)
Dept: CARE COORDINATION | Age: 82
End: 2022-05-19

## 2022-05-19 NOTE — CARE COORDINATION
.Attempted to call to discuss care management enrollment, unable to leave a message. This is third attempt to contact, unable to enroll at this time. No needs identified per chart review, has not had any recent ED visits or hospital admits. No future appointments.

## 2022-07-18 PROBLEM — Z90.710 STATUS POST HYSTERECTOMY: Status: ACTIVE | Noted: 2022-07-18

## 2022-07-18 PROBLEM — R07.89 CHEST DISCOMFORT: Status: ACTIVE | Noted: 2021-08-08

## 2022-07-18 PROBLEM — R06.02 SOB (SHORTNESS OF BREATH): Status: ACTIVE | Noted: 2021-08-23

## 2023-06-01 PROBLEM — Z98.890 S/P LATERAL MENISCAL REPAIR: Status: ACTIVE | Noted: 2023-06-01

## 2023-06-01 PROBLEM — Z76.89 ENCOUNTER FOR CHOLECYSTECTOMY: Status: ACTIVE | Noted: 2023-06-01

## 2023-12-12 ENCOUNTER — APPOINTMENT (OUTPATIENT)
Dept: GENERAL RADIOLOGY | Age: 83
End: 2023-12-12
Payer: MEDICARE

## 2023-12-12 ENCOUNTER — HOSPITAL ENCOUNTER (INPATIENT)
Age: 83
LOS: 2 days | Discharge: HOME | End: 2023-12-14
Attending: EMERGENCY MEDICINE | Admitting: FAMILY MEDICINE
Payer: MEDICARE

## 2023-12-12 DIAGNOSIS — R06.02 SOB (SHORTNESS OF BREATH): ICD-10-CM

## 2023-12-12 DIAGNOSIS — R06.09 DYSPNEA ON EXERTION: Primary | ICD-10-CM

## 2023-12-12 DIAGNOSIS — J40 BRONCHITIS: ICD-10-CM

## 2023-12-12 DIAGNOSIS — I25.10 CORONARY ARTERY DISEASE INVOLVING NATIVE CORONARY ARTERY OF NATIVE HEART WITHOUT ANGINA PECTORIS: ICD-10-CM

## 2023-12-12 PROBLEM — J45.20 RAD (REACTIVE AIRWAY DISEASE), MILD INTERMITTENT, UNCOMPLICATED: Status: ACTIVE | Noted: 2023-12-12

## 2023-12-12 LAB
ABSOLUTE BANDS #: 0.12 K/UL (ref 0–1)
ANION GAP SERPL CALCULATED.3IONS-SCNC: 11 MMOL/L (ref 9–17)
BANDS: 2 % (ref 0–10)
BASOPHILS # BLD: 0 K/UL (ref 0–0.2)
BASOPHILS NFR BLD: 0 % (ref 0–2)
BNP SERPL-MCNC: 47 PG/ML
BUN SERPL-MCNC: 21 MG/DL (ref 8–23)
CALCIUM SERPL-MCNC: 9.5 MG/DL (ref 8.6–10.4)
CHLORIDE SERPL-SCNC: 103 MMOL/L (ref 98–107)
CO2 SERPL-SCNC: 27 MMOL/L (ref 20–31)
CREAT SERPL-MCNC: 0.7 MG/DL (ref 0.5–0.9)
D DIMER PPP FEU-MCNC: 0.78 UG/ML FEU (ref 0–0.59)
EKG ATRIAL RATE: 72 BPM
EKG ATRIAL RATE: 74 BPM
EKG P AXIS: 32 DEGREES
EKG P AXIS: 39 DEGREES
EKG P-R INTERVAL: 178 MS
EKG P-R INTERVAL: 184 MS
EKG Q-T INTERVAL: 384 MS
EKG Q-T INTERVAL: 402 MS
EKG QRS DURATION: 88 MS
EKG QRS DURATION: 90 MS
EKG QTC CALCULATION (BAZETT): 426 MS
EKG QTC CALCULATION (BAZETT): 440 MS
EKG R AXIS: -48 DEGREES
EKG R AXIS: -48 DEGREES
EKG T AXIS: 24 DEGREES
EKG T AXIS: 38 DEGREES
EKG VENTRICULAR RATE: 72 BPM
EKG VENTRICULAR RATE: 74 BPM
EOSINOPHIL # BLD: 0.35 K/UL (ref 0–0.4)
EOSINOPHILS RELATIVE PERCENT: 6 % (ref 0–4)
ERYTHROCYTE [DISTWIDTH] IN BLOOD BY AUTOMATED COUNT: 14.4 % (ref 11.5–14.9)
FLUAV RNA RESP QL NAA+PROBE: NOT DETECTED
FLUBV RNA RESP QL NAA+PROBE: NOT DETECTED
GFR SERPL CREATININE-BSD FRML MDRD: >60 ML/MIN/1.73M2
GLUCOSE SERPL-MCNC: 107 MG/DL (ref 70–99)
HCT VFR BLD AUTO: 44.3 % (ref 36–46)
HGB BLD-MCNC: 14.3 G/DL (ref 12–16)
INR PPP: 0.9
LYMPHOCYTES NFR BLD: 2.6 K/UL (ref 1–4.8)
LYMPHOCYTES RELATIVE PERCENT: 45 % (ref 24–44)
MCH RBC QN AUTO: 29.8 PG (ref 26–34)
MCHC RBC AUTO-ENTMCNC: 32.3 G/DL (ref 31–37)
MCV RBC AUTO: 92.2 FL (ref 80–100)
MONOCYTES NFR BLD: 0.87 K/UL (ref 0.1–1.3)
MONOCYTES NFR BLD: 15 % (ref 1–7)
MORPHOLOGY: NORMAL
MYOGLOBIN SERPL-MCNC: 39 NG/ML (ref 25–58)
MYOGLOBIN SERPL-MCNC: 49 NG/ML (ref 25–58)
NEUTROPHILS NFR BLD: 32 % (ref 36–66)
NEUTS SEG NFR BLD: 1.86 K/UL (ref 1.3–9.1)
PARTIAL THROMBOPLASTIN TIME: 33.1 SEC (ref 24–36)
PLATELET # BLD AUTO: 254 K/UL (ref 150–450)
PMV BLD AUTO: 7.7 FL (ref 6–12)
POTASSIUM SERPL-SCNC: 4.6 MMOL/L (ref 3.7–5.3)
PROTHROMBIN TIME: 12.6 SEC (ref 11.8–14.6)
RBC # BLD AUTO: 4.8 M/UL (ref 4–5.2)
RSV ANTIGEN: NEGATIVE
SARS-COV-2 RNA RESP QL NAA+PROBE: NOT DETECTED
SODIUM SERPL-SCNC: 141 MMOL/L (ref 135–144)
SOURCE: NORMAL
SPECIMEN DESCRIPTION: NORMAL
SPECIMEN SOURCE: NORMAL
T4 FREE SERPL-MCNC: 1.2 NG/DL (ref 0.9–1.7)
TROPONIN I SERPL HS-MCNC: 8 NG/L (ref 0–14)
TROPONIN I SERPL HS-MCNC: 8 NG/L (ref 0–14)
TSH SERPL DL<=0.05 MIU/L-ACNC: 4.37 UIU/ML (ref 0.3–5)
WBC OTHER # BLD: 5.8 K/UL (ref 3.5–11)

## 2023-12-12 PROCEDURE — 85610 PROTHROMBIN TIME: CPT

## 2023-12-12 PROCEDURE — 6360000002 HC RX W HCPCS: Performed by: FAMILY MEDICINE

## 2023-12-12 PROCEDURE — 94664 DEMO&/EVAL PT USE INHALER: CPT

## 2023-12-12 PROCEDURE — 80048 BASIC METABOLIC PNL TOTAL CA: CPT

## 2023-12-12 PROCEDURE — 2580000003 HC RX 258: Performed by: FAMILY MEDICINE

## 2023-12-12 PROCEDURE — 71045 X-RAY EXAM CHEST 1 VIEW: CPT

## 2023-12-12 PROCEDURE — 99285 EMERGENCY DEPT VISIT HI MDM: CPT

## 2023-12-12 PROCEDURE — 87636 SARSCOV2 & INF A&B AMP PRB: CPT

## 2023-12-12 PROCEDURE — 2700000000 HC OXYGEN THERAPY PER DAY

## 2023-12-12 PROCEDURE — 85379 FIBRIN DEGRADATION QUANT: CPT

## 2023-12-12 PROCEDURE — 93010 ELECTROCARDIOGRAM REPORT: CPT | Performed by: INTERNAL MEDICINE

## 2023-12-12 PROCEDURE — 94761 N-INVAS EAR/PLS OXIMETRY MLT: CPT

## 2023-12-12 PROCEDURE — 83874 ASSAY OF MYOGLOBIN: CPT

## 2023-12-12 PROCEDURE — 87807 RSV ASSAY W/OPTIC: CPT

## 2023-12-12 PROCEDURE — G0378 HOSPITAL OBSERVATION PER HR: HCPCS

## 2023-12-12 PROCEDURE — 36415 COLL VENOUS BLD VENIPUNCTURE: CPT

## 2023-12-12 PROCEDURE — 84443 ASSAY THYROID STIM HORMONE: CPT

## 2023-12-12 PROCEDURE — 84439 ASSAY OF FREE THYROXINE: CPT

## 2023-12-12 PROCEDURE — 94640 AIRWAY INHALATION TREATMENT: CPT

## 2023-12-12 PROCEDURE — 84484 ASSAY OF TROPONIN QUANT: CPT

## 2023-12-12 PROCEDURE — 6360000002 HC RX W HCPCS: Performed by: EMERGENCY MEDICINE

## 2023-12-12 PROCEDURE — 85730 THROMBOPLASTIN TIME PARTIAL: CPT

## 2023-12-12 PROCEDURE — 93005 ELECTROCARDIOGRAM TRACING: CPT | Performed by: EMERGENCY MEDICINE

## 2023-12-12 PROCEDURE — 83880 ASSAY OF NATRIURETIC PEPTIDE: CPT

## 2023-12-12 PROCEDURE — 6370000000 HC RX 637 (ALT 250 FOR IP): Performed by: FAMILY MEDICINE

## 2023-12-12 PROCEDURE — 85025 COMPLETE CBC W/AUTO DIFF WBC: CPT

## 2023-12-12 RX ORDER — POTASSIUM CHLORIDE 20 MEQ/1
40 TABLET, EXTENDED RELEASE ORAL PRN
Status: DISCONTINUED | OUTPATIENT
Start: 2023-12-12 | End: 2023-12-14 | Stop reason: HOSPADM

## 2023-12-12 RX ORDER — ACETAMINOPHEN 650 MG/1
650 SUPPOSITORY RECTAL EVERY 6 HOURS PRN
Status: DISCONTINUED | OUTPATIENT
Start: 2023-12-12 | End: 2023-12-14 | Stop reason: HOSPADM

## 2023-12-12 RX ORDER — POTASSIUM CHLORIDE 7.45 MG/ML
10 INJECTION INTRAVENOUS PRN
Status: DISCONTINUED | OUTPATIENT
Start: 2023-12-12 | End: 2023-12-14 | Stop reason: HOSPADM

## 2023-12-12 RX ORDER — DEXAMETHASONE 4 MG/1
4 TABLET ORAL EVERY 24 HOURS
Status: DISCONTINUED | OUTPATIENT
Start: 2023-12-13 | End: 2023-12-14 | Stop reason: HOSPADM

## 2023-12-12 RX ORDER — ACETAMINOPHEN 325 MG/1
650 TABLET ORAL EVERY 6 HOURS PRN
Status: DISCONTINUED | OUTPATIENT
Start: 2023-12-12 | End: 2023-12-14 | Stop reason: HOSPADM

## 2023-12-12 RX ORDER — VITAMIN B COMPLEX
1 CAPSULE ORAL DAILY
Status: DISCONTINUED | OUTPATIENT
Start: 2023-12-12 | End: 2023-12-12

## 2023-12-12 RX ORDER — POLYETHYLENE GLYCOL 3350 17 G/17G
17 POWDER, FOR SOLUTION ORAL DAILY PRN
Status: DISCONTINUED | OUTPATIENT
Start: 2023-12-12 | End: 2023-12-14 | Stop reason: HOSPADM

## 2023-12-12 RX ORDER — ENOXAPARIN SODIUM 100 MG/ML
40 INJECTION SUBCUTANEOUS DAILY
Status: DISCONTINUED | OUTPATIENT
Start: 2023-12-12 | End: 2023-12-12

## 2023-12-12 RX ORDER — NITROGLYCERIN 0.4 MG/1
0.4 TABLET SUBLINGUAL EVERY 5 MIN PRN
Status: DISCONTINUED | OUTPATIENT
Start: 2023-12-12 | End: 2023-12-14 | Stop reason: HOSPADM

## 2023-12-12 RX ORDER — IPRATROPIUM BROMIDE AND ALBUTEROL SULFATE 2.5; .5 MG/3ML; MG/3ML
1 SOLUTION RESPIRATORY (INHALATION)
Status: DISCONTINUED | OUTPATIENT
Start: 2023-12-12 | End: 2023-12-14 | Stop reason: HOSPADM

## 2023-12-12 RX ORDER — PANTOPRAZOLE SODIUM 40 MG/1
40 TABLET, DELAYED RELEASE ORAL
Status: DISCONTINUED | OUTPATIENT
Start: 2023-12-13 | End: 2023-12-14 | Stop reason: HOSPADM

## 2023-12-12 RX ORDER — MAGNESIUM SULFATE HEPTAHYDRATE 40 MG/ML
2000 INJECTION, SOLUTION INTRAVENOUS PRN
Status: DISCONTINUED | OUTPATIENT
Start: 2023-12-12 | End: 2023-12-14 | Stop reason: HOSPADM

## 2023-12-12 RX ORDER — AMLODIPINE BESYLATE 5 MG/1
5 TABLET ORAL DAILY
Status: DISCONTINUED | OUTPATIENT
Start: 2023-12-12 | End: 2023-12-14 | Stop reason: HOSPADM

## 2023-12-12 RX ORDER — SODIUM CHLORIDE 9 MG/ML
INJECTION, SOLUTION INTRAVENOUS CONTINUOUS
Status: ACTIVE | OUTPATIENT
Start: 2023-12-12 | End: 2023-12-14

## 2023-12-12 RX ORDER — ONDANSETRON 4 MG/1
4 TABLET, ORALLY DISINTEGRATING ORAL EVERY 8 HOURS PRN
Status: DISCONTINUED | OUTPATIENT
Start: 2023-12-12 | End: 2023-12-14 | Stop reason: HOSPADM

## 2023-12-12 RX ORDER — DEXAMETHASONE SODIUM PHOSPHATE 4 MG/ML
4 INJECTION, SOLUTION INTRA-ARTICULAR; INTRALESIONAL; INTRAMUSCULAR; INTRAVENOUS; SOFT TISSUE ONCE
Status: COMPLETED | OUTPATIENT
Start: 2023-12-12 | End: 2023-12-12

## 2023-12-12 RX ORDER — NITROGLYCERIN 0.4 MG/1
0.4 TABLET SUBLINGUAL EVERY 5 MIN PRN
COMMUNITY

## 2023-12-12 RX ORDER — SIMVASTATIN 40 MG
40 TABLET ORAL NIGHTLY
Status: DISCONTINUED | OUTPATIENT
Start: 2023-12-12 | End: 2023-12-14 | Stop reason: HOSPADM

## 2023-12-12 RX ORDER — ENOXAPARIN SODIUM 100 MG/ML
40 INJECTION SUBCUTANEOUS DAILY
Status: DISCONTINUED | OUTPATIENT
Start: 2023-12-12 | End: 2023-12-14 | Stop reason: HOSPADM

## 2023-12-12 RX ORDER — ONDANSETRON 2 MG/ML
4 INJECTION INTRAMUSCULAR; INTRAVENOUS EVERY 6 HOURS PRN
Status: DISCONTINUED | OUTPATIENT
Start: 2023-12-12 | End: 2023-12-14 | Stop reason: HOSPADM

## 2023-12-12 RX ORDER — METOPROLOL TARTRATE 50 MG/1
50 TABLET, FILM COATED ORAL 2 TIMES DAILY
Status: DISCONTINUED | OUTPATIENT
Start: 2023-12-12 | End: 2023-12-14 | Stop reason: HOSPADM

## 2023-12-12 RX ORDER — ISOSORBIDE MONONITRATE 60 MG/1
60 TABLET, EXTENDED RELEASE ORAL DAILY
Status: DISCONTINUED | OUTPATIENT
Start: 2023-12-12 | End: 2023-12-14 | Stop reason: HOSPADM

## 2023-12-12 RX ORDER — ENOXAPARIN SODIUM 100 MG/ML
40 INJECTION SUBCUTANEOUS 2 TIMES DAILY
Status: DISCONTINUED | OUTPATIENT
Start: 2023-12-12 | End: 2023-12-12

## 2023-12-12 RX ORDER — ATORVASTATIN CALCIUM 20 MG/1
20 TABLET, FILM COATED ORAL DAILY
Status: DISCONTINUED | OUTPATIENT
Start: 2023-12-12 | End: 2023-12-12 | Stop reason: CLARIF

## 2023-12-12 RX ADMIN — DEXAMETHASONE SODIUM PHOSPHATE 4 MG: 4 INJECTION INTRA-ARTICULAR; INTRALESIONAL; INTRAMUSCULAR; INTRAVENOUS; SOFT TISSUE at 10:37

## 2023-12-12 RX ADMIN — METOPROLOL TARTRATE 50 MG: 50 TABLET, FILM COATED ORAL at 15:49

## 2023-12-12 RX ADMIN — ISOSORBIDE MONONITRATE 60 MG: 60 TABLET, EXTENDED RELEASE ORAL at 15:48

## 2023-12-12 RX ADMIN — AMLODIPINE BESYLATE 5 MG: 5 TABLET ORAL at 15:49

## 2023-12-12 RX ADMIN — METOPROLOL TARTRATE 50 MG: 50 TABLET, FILM COATED ORAL at 20:17

## 2023-12-12 RX ADMIN — IPRATROPIUM BROMIDE AND ALBUTEROL SULFATE 1 DOSE: 2.5; .5 SOLUTION RESPIRATORY (INHALATION) at 19:20

## 2023-12-12 RX ADMIN — ENOXAPARIN SODIUM 40 MG: 100 INJECTION SUBCUTANEOUS at 14:19

## 2023-12-12 RX ADMIN — IPRATROPIUM BROMIDE AND ALBUTEROL SULFATE 1 DOSE: 2.5; .5 SOLUTION RESPIRATORY (INHALATION) at 14:37

## 2023-12-12 RX ADMIN — CEFTRIAXONE SODIUM 1000 MG: 1 INJECTION, POWDER, FOR SOLUTION INTRAMUSCULAR; INTRAVENOUS at 13:37

## 2023-12-12 RX ADMIN — SODIUM CHLORIDE: 9 INJECTION, SOLUTION INTRAVENOUS at 13:32

## 2023-12-12 RX ADMIN — Medication 40 MG: at 20:18

## 2023-12-12 RX ADMIN — AZITHROMYCIN MONOHYDRATE 500 MG: 500 INJECTION, POWDER, LYOPHILIZED, FOR SOLUTION INTRAVENOUS at 14:18

## 2023-12-12 ASSESSMENT — PAIN SCALES - GENERAL
PAINLEVEL_OUTOF10: 3
PAINLEVEL_OUTOF10: 0

## 2023-12-12 ASSESSMENT — LIFESTYLE VARIABLES
HOW MANY STANDARD DRINKS CONTAINING ALCOHOL DO YOU HAVE ON A TYPICAL DAY: PATIENT DOES NOT DRINK
HOW OFTEN DO YOU HAVE A DRINK CONTAINING ALCOHOL: NEVER

## 2023-12-12 ASSESSMENT — PAIN DESCRIPTION - LOCATION: LOCATION: HEAD

## 2023-12-12 ASSESSMENT — PAIN - FUNCTIONAL ASSESSMENT: PAIN_FUNCTIONAL_ASSESSMENT: 0-10

## 2023-12-12 ASSESSMENT — PAIN DESCRIPTION - DESCRIPTORS: DESCRIPTORS: ACHING

## 2023-12-12 ASSESSMENT — PAIN DESCRIPTION - PAIN TYPE: TYPE: ACUTE PAIN

## 2023-12-12 NOTE — ED PROVIDER NOTES
[address] [facility]  eMERGENCY dEPARTMENT eNCOUnter      Pt Name: Myrna Matthew  MRN: [id number]  [facility] 1940  Date of evaluation: 12/12/23      CHIEF COMPLAINT       Chief Complaint   Patient presents with    Shortness of Breath    Headache    Cough    Nasal Congestion     Pt c/o symptoms since last Wednesday. HISTORY OF PRESENT ILLNESS    Myrna Matthew is a 81 y.o. female who presents complaining of shortness of breath. Patient is here stating for the last week she has been ill with a cough productive of some sputum occasionally and shortness of breath. Patient states shortness of breath been getting worse. Patient states she does sometimes get a little bit of chest pain worse when she coughs. Patient has had no fevers no vomiting no diarrhea. Has been had a similar illness before she did. REVIEW OF SYSTEMS       Review of Systems   Constitutional:  Negative for activity change, appetite change, chills, diaphoresis and fever. HENT:  Positive for congestion. Negative for ear pain, facial swelling, nosebleeds, rhinorrhea, sinus pressure, sore throat and trouble swallowing. Eyes:  Negative for pain, discharge and redness. Respiratory:  Positive for cough and shortness of breath. Negative for chest tightness and wheezing. Cardiovascular:  Positive for chest pain and leg swelling. Negative for palpitations. Gastrointestinal:  Negative for abdominal pain, blood in stool, constipation, diarrhea, nausea and vomiting. Genitourinary:  Negative for difficulty urinating, dysuria, flank pain, frequency, genital sores and hematuria. Musculoskeletal:  Negative for arthralgias, back pain, gait problem, joint swelling, myalgias and neck pain. Skin:  Negative for color change, pallor, rash and wound. Neurological:  Negative for dizziness, tremors, seizures, syncope, speech difficulty, weakness, numbness and headaches.    Psychiatric/Behavioral:  Negative for confusion, decreased concentration,

## 2023-12-12 NOTE — ED TRIAGE NOTES
Mode of arrival (squad #, walk in, police, etc) : walk in        Chief complaint(s): Pt c/o SOB, cough, runny nose and HA        Arrival Note (brief scenario, treatment PTA, etc). : Pt states symptoms since last Wednesday        C= \"Have you ever felt that you should Cut down on your drinking? \"  No  A= \"Have people Annoyed you by criticizing your drinking? \"  No  G= \"Have you ever felt bad or Guilty about your drinking? \"  No  E= \"Have you ever had a drink as an Eye-opener first thing in the morning to steady your nerves or to help a hangover? \"  No      Deferred []      Reason for deferring: N/A    *If yes to two or more: probable alcohol abuse. *

## 2023-12-12 NOTE — PLAN OF CARE
Problem: Discharge Planning  Goal: Discharge to home or other facility with appropriate resources  Outcome: Progressing  Flowsheets (Taken 12/12/2023 1158)  Discharge to home or other facility with appropriate resources: Identify barriers to discharge with patient and caregiver     Problem: Pain  Goal: Verbalizes/displays adequate comfort level or baseline comfort level  Outcome: Progressing     Problem: Safety - Adult  Goal: Free from fall injury  Outcome: Progressing     Problem: ABCDS Injury Assessment  Goal: Absence of physical injury  Outcome: Progressing

## 2023-12-12 NOTE — PROGRESS NOTES
12/12/23 2245   Encounter Summary   Encounter Overview/Reason  Spiritual/Emotional Needs   Service Provided For: Patient   Referral/Consult From: Rounding   Complexity of Encounter Low   Spiritual/Emotional needs   Type Spiritual Support   Assessment/Intervention/Outcome   Assessment Unable to assess  (patient sleeping)   Intervention Prayer (assurance of)/[place]

## 2023-12-12 NOTE — ED NOTES
Writer attempted to call report to PCU. Writer placed on hold for 13 mins.      Americo Jameson, MAR  12/12/23 2012

## 2023-12-12 NOTE — PROGRESS NOTES
Pharmacy Medication History Note      List of current medications patient is taking is complete. Source of information: patient, dispense report, OARRS    Changes made to medication list:  Medications flagged for removal (include reason, ex. noncompliance):  None     Medications removed (include reason, ex. therapy complete or physician discontinued):  Glucosamine-chondroitin - patient reports no longer taking   Zinc sulfate - patient is using zinc gluconate    Medications added/doses adjusted:  Cholecalciferol 1000 units daily     Other notes (ex. Recent course of antibiotics, Coumadin dosing):  OARRS negative   Patient reports she is adherent to metoprolol. Last fill was in July for a 90 day supply. Denies use of other OTC or herbal medications.       Allergies clarified    Medication list provided to the patient: no   Medication education provided to the patient: none      Electronically signed by Kyleigh Camacho, [address] [place] [address] on 12/12/2023 at 11:20 AM

## 2023-12-13 ENCOUNTER — APPOINTMENT (OUTPATIENT)
Age: 83
End: 2023-12-13
Attending: FAMILY MEDICINE
Payer: MEDICARE

## 2023-12-13 ENCOUNTER — APPOINTMENT (OUTPATIENT)
Dept: GENERAL RADIOLOGY | Age: 83
End: 2023-12-13
Payer: MEDICARE

## 2023-12-13 LAB
ANION GAP SERPL CALCULATED.3IONS-SCNC: 12 MMOL/L (ref 9–17)
BASOPHILS # BLD: 0 K/UL (ref 0–0.2)
BASOPHILS NFR BLD: 0 % (ref 0–2)
BUN SERPL-MCNC: 18 MG/DL (ref 8–23)
CALCIUM SERPL-MCNC: 8.8 MG/DL (ref 8.6–10.4)
CHLORIDE SERPL-SCNC: 102 MMOL/L (ref 98–107)
CO2 SERPL-SCNC: 24 MMOL/L (ref 20–31)
CREAT SERPL-MCNC: 0.5 MG/DL (ref 0.5–0.9)
ECHO AO ASC DIAM: 3.4 CM
ECHO AO ASCENDING AORTA INDEX: 1.77 CM/M2
ECHO AO ROOT DIAM: 3.3 CM
ECHO AO ROOT INDEX: 1.72 CM/M2
ECHO AV AREA PEAK VELOCITY: 3.3 CM2
ECHO AV AREA/BSA PEAK VELOCITY: 1.7 CM2/M2
ECHO AV PEAK GRADIENT: 7 MMHG
ECHO AV PEAK VELOCITY: 1.3 M/S
ECHO AV VELOCITY RATIO: 0.92
ECHO BSA: 2.04 M2
ECHO IVC PROX: 1.9 CM
ECHO LA AREA 2C: 16.8 CM2
ECHO LA AREA 4C: 21.3 CM2
ECHO LA DIAMETER INDEX: 2.03 CM/M2
ECHO LA DIAMETER: 3.9 CM
ECHO LA MAJOR AXIS: 5.9 CM
ECHO LA MINOR AXIS: 5.2 CM
ECHO LA TO AORTIC ROOT RATIO: 1.18
ECHO LA VOL BP: 55 ML (ref 22–52)
ECHO LA VOL MOD A2C: 44 ML (ref 22–52)
ECHO LA VOL MOD A4C: 62 ML (ref 22–52)
ECHO LA VOL/BSA BIPLANE: 29 ML/M2 (ref 16–34)
ECHO LA VOLUME INDEX MOD A2C: 23 ML/M2 (ref 16–34)
ECHO LA VOLUME INDEX MOD A4C: 32 ML/M2 (ref 16–34)
ECHO LV E' LATERAL VELOCITY: 6 CM/S
ECHO LV E' SEPTAL VELOCITY: 5 CM/S
ECHO LV FRACTIONAL SHORTENING: 48 % (ref 28–44)
ECHO LV INTERNAL DIMENSION DIASTOLE INDEX: 2.6 CM/M2
ECHO LV INTERNAL DIMENSION DIASTOLIC: 5 CM (ref 3.9–5.3)
ECHO LV INTERNAL DIMENSION SYSTOLIC INDEX: 1.35 CM/M2
ECHO LV INTERNAL DIMENSION SYSTOLIC: 2.6 CM
ECHO LV IVSD: 1 CM (ref 0.6–0.9)
ECHO LV MASS 2D: 194.4 G (ref 67–162)
ECHO LV MASS INDEX 2D: 101.2 G/M2 (ref 43–95)
ECHO LV POSTERIOR WALL DIASTOLIC: 1.1 CM (ref 0.6–0.9)
ECHO LV RELATIVE WALL THICKNESS RATIO: 0.44
ECHO LVOT AREA: 3.8 CM2
ECHO LVOT DIAM: 2.2 CM
ECHO LVOT MEAN GRADIENT: 3 MMHG
ECHO LVOT PEAK GRADIENT: 5 MMHG
ECHO LVOT PEAK VELOCITY: 1.2 M/S
ECHO LVOT STROKE VOLUME INDEX: 52.8 ML/M2
ECHO LVOT SV: 101.4 ML
ECHO LVOT VTI: 26.7 CM
ECHO MV A VELOCITY: 1.27 M/S
ECHO MV E DECELERATION TIME (DT): 180 MS
ECHO MV E VELOCITY: 0.9 M/S
ECHO MV E/A RATIO: 0.71
ECHO MV E/E' LATERAL: 15
ECHO MV E/E' RATIO (AVERAGED): 16.5
ECHO PV MAX VELOCITY: 1 M/S
ECHO PV PEAK GRADIENT: 4 MMHG
ECHO RV BASAL DIMENSION: 4.4 CM
ECHO RV FREE WALL PEAK S': 21 CM/S
ECHO RV TAPSE: 2.6 CM (ref 1.7–?)
EOSINOPHIL # BLD: 0 K/UL (ref 0–0.4)
EOSINOPHILS RELATIVE PERCENT: 0 % (ref 0–4)
ERYTHROCYTE [DISTWIDTH] IN BLOOD BY AUTOMATED COUNT: 13.8 % (ref 11.5–14.9)
GFR SERPL CREATININE-BSD FRML MDRD: >60 ML/MIN/1.73M2
GLUCOSE SERPL-MCNC: 151 MG/DL (ref 70–99)
HCT VFR BLD AUTO: 37.7 % (ref 36–46)
HGB BLD-MCNC: 12.5 G/DL (ref 12–16)
LYMPHOCYTES NFR BLD: 1.2 K/UL (ref 1–4.8)
LYMPHOCYTES RELATIVE PERCENT: 26 % (ref 24–44)
MCH RBC QN AUTO: 29.7 PG (ref 26–34)
MCHC RBC AUTO-ENTMCNC: 33.2 G/DL (ref 31–37)
MCV RBC AUTO: 89.6 FL (ref 80–100)
MONOCYTES NFR BLD: 0.7 K/UL (ref 0.1–1.3)
MONOCYTES NFR BLD: 14 % (ref 1–7)
NEUTROPHILS NFR BLD: 60 % (ref 36–66)
NEUTS SEG NFR BLD: 2.7 K/UL (ref 1.3–9.1)
PLATELET # BLD AUTO: 237 K/UL (ref 150–450)
PMV BLD AUTO: 7.7 FL (ref 6–12)
POTASSIUM SERPL-SCNC: 4 MMOL/L (ref 3.7–5.3)
RBC # BLD AUTO: 4.21 M/UL (ref 4–5.2)
SODIUM SERPL-SCNC: 138 MMOL/L (ref 135–144)
WBC OTHER # BLD: 4.6 K/UL (ref 3.5–11)

## 2023-12-13 PROCEDURE — 6370000000 HC RX 637 (ALT 250 FOR IP): Performed by: FAMILY MEDICINE

## 2023-12-13 PROCEDURE — 85025 COMPLETE CBC W/AUTO DIFF WBC: CPT

## 2023-12-13 PROCEDURE — 93306 TTE W/DOPPLER COMPLETE: CPT

## 2023-12-13 PROCEDURE — 6360000002 HC RX W HCPCS: Performed by: FAMILY MEDICINE

## 2023-12-13 PROCEDURE — 94640 AIRWAY INHALATION TREATMENT: CPT

## 2023-12-13 PROCEDURE — 2580000003 HC RX 258: Performed by: FAMILY MEDICINE

## 2023-12-13 PROCEDURE — 1200000000 HC SEMI PRIVATE

## 2023-12-13 PROCEDURE — 80048 BASIC METABOLIC PNL TOTAL CA: CPT

## 2023-12-13 PROCEDURE — 94761 N-INVAS EAR/PLS OXIMETRY MLT: CPT

## 2023-12-13 PROCEDURE — 2700000000 HC OXYGEN THERAPY PER DAY

## 2023-12-13 PROCEDURE — 36415 COLL VENOUS BLD VENIPUNCTURE: CPT

## 2023-12-13 PROCEDURE — 71046 X-RAY EXAM CHEST 2 VIEWS: CPT

## 2023-12-13 RX ORDER — BENZONATATE 100 MG/1
100 CAPSULE ORAL 3 TIMES DAILY PRN
Status: DISCONTINUED | OUTPATIENT
Start: 2023-12-13 | End: 2023-12-14 | Stop reason: HOSPADM

## 2023-12-13 RX ADMIN — METOPROLOL TARTRATE 50 MG: 50 TABLET, FILM COATED ORAL at 09:02

## 2023-12-13 RX ADMIN — AMLODIPINE BESYLATE 5 MG: 5 TABLET ORAL at 09:02

## 2023-12-13 RX ADMIN — AZITHROMYCIN MONOHYDRATE 500 MG: 500 INJECTION, POWDER, LYOPHILIZED, FOR SOLUTION INTRAVENOUS at 15:50

## 2023-12-13 RX ADMIN — ENOXAPARIN SODIUM 40 MG: 100 INJECTION SUBCUTANEOUS at 13:51

## 2023-12-13 RX ADMIN — DEXAMETHASONE 4 MG: 4 TABLET ORAL at 09:05

## 2023-12-13 RX ADMIN — CEFTRIAXONE SODIUM 1000 MG: 1 INJECTION, POWDER, FOR SOLUTION INTRAMUSCULAR; INTRAVENOUS at 13:52

## 2023-12-13 RX ADMIN — SODIUM CHLORIDE: 9 INJECTION, SOLUTION INTRAVENOUS at 04:31

## 2023-12-13 RX ADMIN — IPRATROPIUM BROMIDE AND ALBUTEROL SULFATE 1 DOSE: 2.5; .5 SOLUTION RESPIRATORY (INHALATION) at 19:28

## 2023-12-13 RX ADMIN — Medication 40 MG: at 21:15

## 2023-12-13 RX ADMIN — BENZONATATE 100 MG: 100 CAPSULE ORAL at 17:46

## 2023-12-13 RX ADMIN — IPRATROPIUM BROMIDE AND ALBUTEROL SULFATE 1 DOSE: 2.5; .5 SOLUTION RESPIRATORY (INHALATION) at 15:00

## 2023-12-13 RX ADMIN — IPRATROPIUM BROMIDE AND ALBUTEROL SULFATE 1 DOSE: 2.5; .5 SOLUTION RESPIRATORY (INHALATION) at 08:16

## 2023-12-13 RX ADMIN — METOPROLOL TARTRATE 50 MG: 50 TABLET, FILM COATED ORAL at 21:14

## 2023-12-13 RX ADMIN — SODIUM CHLORIDE: 9 INJECTION, SOLUTION INTRAVENOUS at 17:35

## 2023-12-13 RX ADMIN — ISOSORBIDE MONONITRATE 60 MG: 60 TABLET, EXTENDED RELEASE ORAL at 09:02

## 2023-12-13 RX ADMIN — PANTOPRAZOLE SODIUM 40 MG: 40 TABLET, DELAYED RELEASE ORAL at 06:10

## 2023-12-13 NOTE — PROGRESS NOTES
Nutrition Assessment     Type and Reason for Visit: Positive Nutrition Screen (wt loss)    Nutrition Recommendations/Plan:   Will provide 4 carbohydrate choices per tray with No Added Salt restriction. Malnutrition Assessment:  Malnutrition Status:  None    Nutrition Assessment:  Pt was admitted due to dyspnea. She has been advanced to solid food and consumed 100% so far. Review of wt history shows no significant wt loss based on bed scale wt. Pt states she has never been tod she has DM and eats a regular diet at home. Glu 151         Current Nutrition Therapies:    ADULT ORAL NUTRITION SUPPLEMENT; Breakfast, Lunch; Standard High Calorie/High Protein Oral Supplement  ADULT DIET; Regular; 4 carb choices (60 gm/meal);  Low Fat/Low Chol/High Fiber/ANNE    Anthropometric Measures:  Height: 149.9 cm (4' 11\")  Current Body Wt: 101 kg (222 lb 10.6 oz) (obtained by RD)   BMI: 44.9    Nutrition Diagnosis:   No nutrition diagnosis at this time     Nutrition Interventions:   Food and/or Nutrient Delivery: Modify Current Diet                      Nutrition Monitoring and Evaluation:      Food/Nutrient Intake Outcomes: Food and Nutrient Intake       Discharge Planning:          Amanda Craig [address] [facility],   Contact: [phone]

## 2023-12-13 NOTE — PROGRESS NOTES
Hospitalist Progress Note  12/13/2023 5:38 PM  Subjective:   Admit Date: 12/12/2023  PCP: Masha Doe MD     Full Code      C/c:  Chief Complaint   Patient presents with    Shortness of Breath    Headache    Cough    Nasal Congestion     Pt c/o symptoms since last Wednesday. Interval History: doing slightly better, less cough, xray showed no infiltrate    Diet: ADULT ORAL NUTRITION SUPPLEMENT; Breakfast, Lunch; Standard High Calorie/High Protein Oral Supplement  ADULT DIET; Regular; 4 carb choices (60 gm/meal); Low Fat/Low Chol/High Fiber/ANNE                                ip days:1  Medications:   Scheduled Meds:   amLODIPine  5 mg Oral Daily    pantoprazole  40 mg Oral QAM AC    isosorbide mononitrate  60 mg Oral Daily    metoprolol tartrate  50 mg Oral BID    enoxaparin  40 mg SubCUTAneous Daily    ipratropium 0.5 mg-albuterol 2.5 mg  1 Dose Inhalation Q6H WA RT    cefTRIAXone (ROCEPHIN) IV  1,000 mg IntraVENous Q24H    azithromycin  500 mg IntraVENous Q24H    dexamethasone  4 mg Oral Q24H    simvastatin  40 mg Oral Nightly     Continuous Infusions:   sodium chloride 75 mL/hr at 12/13/23 1735     PRN Meds:.benzonatate, nitroGLYCERIN, potassium chloride **OR** potassium alternative oral replacement **OR** potassium chloride, magnesium sulfate, ondansetron **OR** ondansetron, polyethylene glycol, acetaminophen **OR** acetaminophen     CBC:   Recent Labs     12/12/23  0830 12/13/23  0521   WBC 5.8 4.6   HGB 14.3 12.5    237     BMP:    Recent Labs     12/12/23  0908 12/13/23  0521    138   K 4.6 4.0    102   CO2 27 24   BUN 21 18   CREATININE 0.7 0.5   GLUCOSE 107* 151*     Hepatic: No results for input(s): \"AST\", \"ALT\", \"ALB\", \"BILITOT\", \"ALKPHOS\" in the last 72 hours. Troponin: No results for input(s): \"TROPONINI\" in the last 72 hours. BNP: No results for input(s): \"BNP\" in the last 72 hours. Lipids: No results for input(s): \"CHOL\", \"HDL\" in the last 72 hours.     Invalid The cardiomediastinal silhouette is unchanged in appearance. Calcified granuloma in the left lower lung field. There is no consolidation, pneumothorax, or evidence of edema. No effusion is appreciated. The osseous structures are unchanged in appearance. No acute airspace disease identified. XR CHEST PORTABLE    Result Date: 12/12/2023  EXAMINATION: ONE XRAY VIEW OF THE CHEST 12/12/2023 8:18 am COMPARISON: 08/08/2021 HISTORY: ORDERING SYSTEM PROVIDED HISTORY: Chest Pain TECHNOLOGIST PROVIDED HISTORY: Chest Pain Reason for Exam: sob, chest pain, cough FINDINGS: Mild cardiomegaly. Normal pulmonary vasculature. No focal consolidation, pleural effusion, or pneumothorax. Degenerative changes in both shoulders. No evidence of acute process. Severe degenerative changes in both shoulders. Assessment : Ac bronchitis/aerosol/antibiotics/decadro  Cad/stable     Plan:   1. Continue present plan  2.   See order    Patient Active Problem List:     CAD (coronary artery disease)     Gastroesophageal reflux disease     Hypertension     Hypercholesterolemia     Osteoarthritis of right glenohumeral joint     Chest discomfort     Mixed hyperlipidemia     Class 3 severe obesity without serious comorbidity with body mass index (BMI) of 45.0 to 49.9 in adult (HCC)     SOB (shortness of breath)     Status post hysterectomy     S/P lateral meniscal repair     Encounter for cholecystectomy     Bronchitis     RAD (reactive airway disease), mild intermittent, uncomplicated      Anticipated Disposition upon discharge: [] Home                                                                         [] Home with Home Health                                                                         [] [address]                                                                         [] [address]      Patient is admitted as inpatient status because of co-morbidities listed above, severity of

## 2023-12-13 NOTE — PLAN OF CARE
Problem: Discharge Planning  Goal: Discharge to home or other facility with appropriate resources  12/13/2023 0354 by Dalia Sahu RN  Outcome: Progressing       Problem: Pain  Goal: Verbalizes/displays adequate comfort level or baseline comfort level  12/13/2023 0354 by Dalia Sahu RN  Outcome: Progressing       Problem: Safety - Adult  Goal: Free from fall injury  12/13/2023 0354 by Dalia Sahu RN  Outcome: Progressing    Problem: ABCDS Injury Assessment  Goal: Absence of physical injury  12/13/2023 0354 by Dalia Sahu RN  Outcome: Progressing

## 2023-12-13 NOTE — CARE COORDINATION
Case Management Assessment  Initial Evaluation    Date/Time of Evaluation: 12/13/2023 4:42 PM  Assessment Completed by: Marquez Renee RN    If patient is discharged prior to next notation, then this note serves as note for discharge by case management. Patient Name: Isatu Clark                   YOB: 1940  Diagnosis: Dyspnea on exertion [R06.09]  Bronchitis [J40]  RAD (reactive airway disease), mild intermittent, uncomplicated [W54.40]                   Date / Time: 12/12/2023  8:06 AM    Patient Admission Status: Observation   Readmission Risk (Low < 19, Mod (19-27), High > 27): Readmission Risk Score: 9.4    Current PCP: Dakota Rico MD  PCP verified by CM? Yes    Chart Reviewed: Yes      History Provided by: Patient  Patient Orientation: Alert and Oriented    Patient Cognition: Alert    Hospitalization in the last 30 days (Readmission):  No    If yes, Readmission Assessment in CM Navigator will be completed. Advance Directives:      Code Status: Full Code   Patient's Primary Decision Maker is: Legal Next of Kin      Discharge Planning:    Patient lives with: Alone Type of Home: House  Primary Care Giver: Self  Patient Support Systems include: Children   Current Financial resources: Medicare  Current community resources: None  Current services prior to admission: Other (Comment) (outpatient therapy)            Current DME:              Type of Home Care services:  None    ADLS  Prior functional level: Independent in ADLs/IADLs  Current functional level:      PT AM-PAC:   /24  OT AM-PAC:   /24    Family can provide assistance at DC: Yes  Would you like Case Management to discuss the discharge plan with any other family members/significant others, and if so, who? Yes  Plans to Return to Present Housing: Yes  Other Identified Issues/Barriers to RETURNING to current housing: no  Potential Assistance needed at discharge:  Outpatient PT/OT            Potential DME:    Patient expects

## 2023-12-14 VITALS
OXYGEN SATURATION: 95 % | WEIGHT: 220.9 LBS | DIASTOLIC BLOOD PRESSURE: 61 MMHG | BODY MASS INDEX: 44.53 KG/M2 | RESPIRATION RATE: 16 BRPM | TEMPERATURE: 97.5 F | HEIGHT: 59 IN | HEART RATE: 70 BPM | SYSTOLIC BLOOD PRESSURE: 145 MMHG

## 2023-12-14 PROCEDURE — 6370000000 HC RX 637 (ALT 250 FOR IP): Performed by: FAMILY MEDICINE

## 2023-12-14 PROCEDURE — 94761 N-INVAS EAR/PLS OXIMETRY MLT: CPT

## 2023-12-14 PROCEDURE — 2580000003 HC RX 258: Performed by: FAMILY MEDICINE

## 2023-12-14 PROCEDURE — 6360000002 HC RX W HCPCS: Performed by: FAMILY MEDICINE

## 2023-12-14 PROCEDURE — 94640 AIRWAY INHALATION TREATMENT: CPT

## 2023-12-14 RX ORDER — BENZONATATE 100 MG/1
100 CAPSULE ORAL 3 TIMES DAILY PRN
Qty: 30 CAPSULE | Refills: 0 | Status: SHIPPED | OUTPATIENT
Start: 2023-12-14 | End: 2023-12-24

## 2023-12-14 RX ORDER — DEXAMETHASONE 4 MG/1
4 TABLET ORAL EVERY 24 HOURS
Qty: 3 TABLET | Refills: 0 | Status: SHIPPED | OUTPATIENT
Start: 2023-12-15 | End: 2023-12-18

## 2023-12-14 RX ORDER — AZITHROMYCIN 250 MG/1
TABLET, FILM COATED ORAL
Qty: 2 TABLET | Refills: 0 | Status: SHIPPED | OUTPATIENT
Start: 2023-12-14

## 2023-12-14 RX ORDER — ASPIRIN 81 MG/1
81 TABLET ORAL DAILY
Qty: 30 TABLET | Refills: 5 | Status: SHIPPED | OUTPATIENT
Start: 2023-12-14

## 2023-12-14 RX ORDER — CEFUROXIME AXETIL 250 MG/1
250 TABLET ORAL 2 TIMES DAILY
Qty: 14 TABLET | Refills: 0 | Status: SHIPPED | OUTPATIENT
Start: 2023-12-14 | End: 2023-12-21

## 2023-12-14 RX ADMIN — IPRATROPIUM BROMIDE AND ALBUTEROL SULFATE 1 DOSE: 2.5; .5 SOLUTION RESPIRATORY (INHALATION) at 13:05

## 2023-12-14 RX ADMIN — SODIUM CHLORIDE: 9 INJECTION, SOLUTION INTRAVENOUS at 05:34

## 2023-12-14 RX ADMIN — IPRATROPIUM BROMIDE AND ALBUTEROL SULFATE 1 DOSE: 2.5; .5 SOLUTION RESPIRATORY (INHALATION) at 06:52

## 2023-12-14 RX ADMIN — PANTOPRAZOLE SODIUM 40 MG: 40 TABLET, DELAYED RELEASE ORAL at 08:25

## 2023-12-14 RX ADMIN — ENOXAPARIN SODIUM 40 MG: 100 INJECTION SUBCUTANEOUS at 08:25

## 2023-12-14 RX ADMIN — CEFTRIAXONE SODIUM 1000 MG: 1 INJECTION, POWDER, FOR SOLUTION INTRAMUSCULAR; INTRAVENOUS at 12:45

## 2023-12-14 RX ADMIN — AZITHROMYCIN MONOHYDRATE 500 MG: 500 INJECTION, POWDER, LYOPHILIZED, FOR SOLUTION INTRAVENOUS at 13:46

## 2023-12-14 RX ADMIN — AMLODIPINE BESYLATE 5 MG: 5 TABLET ORAL at 08:27

## 2023-12-14 RX ADMIN — ISOSORBIDE MONONITRATE 60 MG: 60 TABLET, EXTENDED RELEASE ORAL at 08:27

## 2023-12-14 RX ADMIN — METOPROLOL TARTRATE 50 MG: 50 TABLET, FILM COATED ORAL at 08:28

## 2023-12-14 RX ADMIN — BENZONATATE 100 MG: 100 CAPSULE ORAL at 13:46

## 2023-12-14 RX ADMIN — DEXAMETHASONE 4 MG: 4 TABLET ORAL at 10:06

## 2023-12-14 NOTE — CARE COORDINATION
ONGOING DISCHARGE PLAN:    Patient is alert and oriented x4. Spoke with patient regarding discharge plan and patient confirms that plan is still home without needs. Has supportive family and neighbor. Active order for IV Zithro and IV Rocephin. PO Decadron. Pt is hoping to discharge home soon. Will continue to follow for additional discharge needs. If patient is discharged prior to next notation, then this note serves as note for discharge by case management.     Electronically signed by Dieter Calhoun RN on 12/14/2023 at 1:30 PM

## 2023-12-14 NOTE — DISCHARGE SUMMARY
Hospitalist Discharge Summary    Hilario Alatorre  :  1940  MRN:  [id number]    Admit date:  2023  Discharge date:  23    Admitting Physician:  Michelle Perez MD    Discharge Diagnoses:   Patient Active Problem List   Diagnosis    CAD (coronary artery disease)    Gastroesophageal reflux disease    Hypertension    Hypercholesterolemia    Osteoarthritis of right glenohumeral joint    Chest discomfort    Mixed hyperlipidemia    Class 3 severe obesity without serious comorbidity with body mass index (BMI) of 45.0 to 49.9 in adult ([address] )    SOB (shortness of breath)    Status post hysterectomy    S/P lateral meniscal repair    Encounter for cholecystectomy    Bronchitis    RAD (reactive airway disease), mild intermittent, uncomplicated        Admission Condition:  fair      Discharged Condition:  good    Hospital Course/Treatments   admitted with reactive airway disease that flared up few days back, pt non smoker was seen in ER and admitted for hypoxia, pt was started on oxygen and decadron and antibiotics, pt covid was negative, pt did well, pt will be d/c with following meds    Discharge Medications:         Medication List        START taking these medications      aspirin 81 MG EC tablet  Take 1 tablet by mouth daily  Replaces: aspirin 325 MG tablet     azithromycin 250 MG tablet  Commonly known as: Zithromax  Take one tab daily for 2 days     benzonatate 100 MG capsule  Commonly known as: TESSALON  Take 1 capsule by mouth 3 times daily as needed for Cough     cefUROXime 250 MG tablet  Commonly known as: CEFTIN  Take 1 tablet by mouth 2 times daily for 7 days     dexamethasone 4 MG tablet  Commonly known as: DECADRON  Take 1 tablet by mouth every 24 hours for 3 days  Start taking on: December 15, 2023            CONTINUE taking these medications      amLODIPine 5 MG tablet  Commonly known as: NORVASC  Take one tablet daily     b complex vitamins capsule     [employer] Purelax 17 GM/SCOOP powder  Generic

## 2023-12-14 NOTE — PROGRESS NOTES
BRONCHOSPASM/BRONCHOCONSTRICTION     [x]         IMPROVE AERATION/BREATH SOUNDS  [x]   ADMINISTER BRONCHODILATOR THERAPY AS APPROPRIATE  []   ASSESS BREATH SOUNDS  [x]   IMPLEMENT AEROSOL/MDI PROTOCOL  [x]   PATIENT EDUCATION AS NEEDED

## 2023-12-14 NOTE — PLAN OF CARE
Problem: Discharge Planning  Goal: Discharge to home or other facility with appropriate resources  12/14/2023 1454 by Priyank Barnett RN  Outcome: Completed  Flowsheets (Taken 12/14/2023 1027)  Discharge to home or other facility with appropriate resources:   Identify barriers to discharge with patient and caregiver   Arrange for needed discharge resources and transportation as appropriate   Identify discharge learning needs (meds, wound care, etc)   Refer to discharge planning if patient needs post-hospital services based on physician order or complex needs related to functional status, cognitive ability or social support system  12/14/2023 0400 by Andrew Felder RN  Outcome: Progressing  Flowsheets (Taken 12/13/2023 2000)  Discharge to home or other facility with appropriate resources:   Identify barriers to discharge with patient and caregiver   Arrange for needed discharge resources and transportation as appropriate   Identify discharge learning needs (meds, wound care, etc)   Arrange for interpreters to assist at discharge as needed   Refer to discharge planning if patient needs post-hospital services based on physician order or complex needs related to functional status, cognitive ability or social support system     Problem: Pain  Goal: Verbalizes/displays adequate comfort level or baseline comfort level  12/14/2023 1454 by Priyank Barnett RN  Outcome: Completed  12/14/2023 0400 by Andrew Felder RN  Outcome: Progressing     Problem: Safety - Adult  Goal: Free from fall injury  12/14/2023 1454 by Priyank Barnett RN  Outcome: Completed  12/14/2023 0400 by Andrew Felder RN  Outcome: Progressing  Flowsheets (Taken 12/13/2023 2158)  Free From Fall Injury: Instruct family/caregiver on patient safety     Problem: ABCDS Injury Assessment  Goal: Absence of physical injury  12/14/2023 1454 by Priyank Barnett RN  Outcome: Completed  12/14/2023 0400 by Andrew Felder RN  Outcome: Progressing  Flowsheets (Taken 12/13/2023 2158)  Absence of Physical Injury: Implement safety measures based on patient assessment     Problem: Respiratory - Adult  Goal: Achieves optimal ventilation and oxygenation  12/14/2023 1454 by Ariadna Canada RN  Outcome: Completed  12/14/2023 0400 by Joni Ramos RN  Reactivated     Problem: Cardiovascular - Adult  Goal: Maintains optimal cardiac output and hemodynamic stability  12/14/2023 1454 by Ariadna Canada RN  Outcome: Completed  12/14/2023 0400 by Joni Ramos RN  Reactivated     Problem: Hematologic - Adult  Goal: Maintains hematologic stability  12/14/2023 1454 by Ariadna Canada RN  Outcome: Completed  12/14/2023 0400 by Joni Ramos RN  Reactivated

## 2023-12-14 NOTE — PLAN OF CARE
Problem: Discharge Planning  Goal: Discharge to home or other facility with appropriate resources  Outcome: Progressing  Flowsheets (Taken 12/13/2023 2000)  Discharge to home or other facility with appropriate resources:   Identify barriers to discharge with patient and caregiver   Arrange for needed discharge resources and transportation as appropriate   Identify discharge learning needs (meds, wound care, etc)   Arrange for interpreters to assist at discharge as needed   Refer to discharge planning if patient needs post-hospital services based on physician order or complex needs related to functional status, cognitive ability or social support system     Problem: Pain  Goal: Verbalizes/displays adequate comfort level or baseline comfort level  Outcome: Progressing     Problem: Safety - Adult  Goal: Free from fall injury  Outcome: Progressing  Flowsheets (Taken 12/13/2023 2158)  Free From Fall Injury: Instruct family/caregiver on patient safety     Problem: ABCDS Injury Assessment  Goal: Absence of physical injury  Outcome: Progressing  Flowsheets (Taken 12/13/2023 2158)  Absence of Physical Injury: Implement safety measures based on patient assessment    Problem: Cardiovascular - Adult  Goal: Maintains optimal cardiac output and hemodynamic stability  Reactivated     Problem: Hematologic - Adult  Goal: Maintains hematologic stability  Reactivated

## 2023-12-15 ENCOUNTER — CARE COORDINATION (OUTPATIENT)
Dept: CASE MANAGEMENT | Age: 83
End: 2023-12-15

## 2023-12-15 NOTE — CARE COORDINATION
Care Transitions Outreach Attempt #1  Initial 24 hour call. Call within 2 business days of discharge: Yes   Attempted to reach patient for transitions of care follow up. Unable to reach patient. Unable to leave message. HIPAA compliant message left on VM of EC/son requesting a return call. Patient: Allison Crowell Patient : 1940 MRN: [id number]    Last Discharge Facility       Date Complaint Diagnosis Description Type Department Provider    23 Shortness of Breath; Headache; Cough; Nasal Congestion Dyspnea on exertion . .. ED to Hosp-Admission (Discharged) (ADMITTED) [facility] CECILIA Sotero Maharaj MD; Feliciano Ng,... Was this an external facility discharge?  No Discharge Facility: [facility]    Noted following upcoming appointments from discharge chart review:   [address] [address] Cintron [facility],[address] follow up appointment(s):   Future Appointments   Date Time Provider [address] [place] [address]   2024 10:00 AM Preeti Centeno, APRN - NP [address]  Care Transition Nurse

## 2024-04-15 ENCOUNTER — APPOINTMENT (OUTPATIENT)
Dept: GENERAL RADIOLOGY | Age: 84
DRG: 069 | End: 2024-04-15
Payer: MEDICARE

## 2024-04-15 ENCOUNTER — APPOINTMENT (OUTPATIENT)
Dept: CT IMAGING | Age: 84
DRG: 069 | End: 2024-04-15
Payer: MEDICARE

## 2024-04-15 ENCOUNTER — HOSPITAL ENCOUNTER (INPATIENT)
Age: 84
LOS: 3 days | Discharge: HOME | DRG: 069 | End: 2024-04-18
Attending: EMERGENCY MEDICINE | Admitting: FAMILY MEDICINE
Payer: MEDICARE

## 2024-04-15 DIAGNOSIS — G45.9 TIA (TRANSIENT ISCHEMIC ATTACK): Primary | ICD-10-CM

## 2024-04-15 LAB
ALBUMIN SERPL-MCNC: 3.9 G/DL (ref 3.5–5.2)
ALP SERPL-CCNC: 96 U/L (ref 35–104)
ALT SERPL-CCNC: 10 U/L (ref 5–33)
ANION GAP SERPL CALCULATED.3IONS-SCNC: 13 MMOL/L (ref 9–17)
AST SERPL-CCNC: 16 U/L
BACTERIA URNS QL MICRO: ABNORMAL
BASOPHILS # BLD: 0 K/UL (ref 0–0.2)
BASOPHILS NFR BLD: 0 % (ref 0–2)
BILIRUB SERPL-MCNC: 0.3 MG/DL (ref 0.3–1.2)
BILIRUB UR QL STRIP: NEGATIVE
BUN SERPL-MCNC: 19 MG/DL (ref 8–23)
CALCIUM SERPL-MCNC: 9.5 MG/DL (ref 8.6–10.4)
CASTS #/AREA URNS LPF: ABNORMAL /LPF
CHLORIDE SERPL-SCNC: 102 MMOL/L (ref 98–107)
CLARITY UR: CLEAR
CO2 SERPL-SCNC: 26 MMOL/L (ref 20–31)
COLOR UR: ABNORMAL
CREAT SERPL-MCNC: 0.7 MG/DL (ref 0.5–0.9)
EOSINOPHIL # BLD: 0.2 K/UL (ref 0–0.4)
EOSINOPHILS RELATIVE PERCENT: 2 % (ref 0–4)
EPI CELLS #/AREA URNS HPF: ABNORMAL /HPF
ERYTHROCYTE [DISTWIDTH] IN BLOOD BY AUTOMATED COUNT: 13.3 % (ref 11.5–14.9)
FLUAV RNA RESP QL NAA+PROBE: NOT DETECTED
FLUBV RNA RESP QL NAA+PROBE: NOT DETECTED
GFR SERPL CREATININE-BSD FRML MDRD: 86 ML/MIN/1.73M2
GLUCOSE BLD-MCNC: 97 MG/DL (ref 65–105)
GLUCOSE SERPL-MCNC: 108 MG/DL (ref 70–99)
GLUCOSE UR STRIP-MCNC: NEGATIVE MG/DL
HCT VFR BLD AUTO: 41.1 % (ref 36–46)
HGB BLD-MCNC: 13.6 G/DL (ref 12–16)
HGB UR QL STRIP.AUTO: NEGATIVE
INR PPP: 1
KETONES UR STRIP-MCNC: NEGATIVE MG/DL
LEUKOCYTE ESTERASE UR QL STRIP: ABNORMAL
LYMPHOCYTES NFR BLD: 2 K/UL (ref 1–4.8)
LYMPHOCYTES RELATIVE PERCENT: 20 % (ref 24–44)
MAGNESIUM SERPL-MCNC: 2.1 MG/DL (ref 1.6–2.6)
MCH RBC QN AUTO: 30 PG (ref 26–34)
MCHC RBC AUTO-ENTMCNC: 33.1 G/DL (ref 31–37)
MCV RBC AUTO: 90.6 FL (ref 80–100)
MONOCYTES NFR BLD: 1.5 K/UL (ref 0.1–1.3)
MONOCYTES NFR BLD: 15 % (ref 1–7)
NEUTROPHILS NFR BLD: 63 % (ref 36–66)
NEUTS SEG NFR BLD: 6.1 K/UL (ref 1.3–9.1)
NITRITE UR QL STRIP: NEGATIVE
PH UR STRIP: 5 [PH] (ref 5–8)
PLATELET # BLD AUTO: 306 K/UL (ref 150–450)
PMV BLD AUTO: 8.3 FL (ref 6–12)
POTASSIUM SERPL-SCNC: 4.5 MMOL/L (ref 3.7–5.3)
PROT SERPL-MCNC: 8 G/DL (ref 6.4–8.3)
PROT UR STRIP-MCNC: NEGATIVE MG/DL
PROTHROMBIN TIME: 13.4 SEC (ref 11.8–14.6)
RBC # BLD AUTO: 4.54 M/UL (ref 4–5.2)
RBC #/AREA URNS HPF: ABNORMAL /HPF
SARS-COV-2 RNA RESP QL NAA+PROBE: NOT DETECTED
SODIUM SERPL-SCNC: 141 MMOL/L (ref 135–144)
SOURCE: NORMAL
SP GR UR STRIP: 1.02 (ref 1–1.03)
SPECIMEN DESCRIPTION: NORMAL
TROPONIN I SERPL HS-MCNC: 7 NG/L (ref 0–14)
TROPONIN I SERPL HS-MCNC: 9 NG/L (ref 0–14)
UROBILINOGEN UR STRIP-ACNC: NORMAL EU/DL (ref 0–1)
WBC #/AREA URNS HPF: ABNORMAL /HPF
WBC OTHER # BLD: 9.8 K/UL (ref 3.5–11)

## 2024-04-15 PROCEDURE — 70498 CT ANGIOGRAPHY NECK: CPT

## 2024-04-15 PROCEDURE — 87636 SARSCOV2 & INF A&B AMP PRB: CPT

## 2024-04-15 PROCEDURE — 80053 COMPREHEN METABOLIC PANEL: CPT

## 2024-04-15 PROCEDURE — 6360000004 HC RX CONTRAST MEDICATION: Performed by: EMERGENCY MEDICINE

## 2024-04-15 PROCEDURE — 70450 CT HEAD/BRAIN W/O DYE: CPT

## 2024-04-15 PROCEDURE — 99285 EMERGENCY DEPT VISIT HI MDM: CPT

## 2024-04-15 PROCEDURE — 82947 ASSAY GLUCOSE BLOOD QUANT: CPT

## 2024-04-15 PROCEDURE — 2060000000 HC ICU INTERMEDIATE R&B

## 2024-04-15 PROCEDURE — 36415 COLL VENOUS BLD VENIPUNCTURE: CPT

## 2024-04-15 PROCEDURE — 93005 ELECTROCARDIOGRAM TRACING: CPT | Performed by: EMERGENCY MEDICINE

## 2024-04-15 PROCEDURE — 71045 X-RAY EXAM CHEST 1 VIEW: CPT

## 2024-04-15 PROCEDURE — 6370000000 HC RX 637 (ALT 250 FOR IP): Performed by: EMERGENCY MEDICINE

## 2024-04-15 PROCEDURE — 2580000003 HC RX 258: Performed by: FAMILY MEDICINE

## 2024-04-15 PROCEDURE — 85610 PROTHROMBIN TIME: CPT

## 2024-04-15 PROCEDURE — 87086 URINE CULTURE/COLONY COUNT: CPT

## 2024-04-15 PROCEDURE — 84484 ASSAY OF TROPONIN QUANT: CPT

## 2024-04-15 PROCEDURE — 2580000003 HC RX 258: Performed by: EMERGENCY MEDICINE

## 2024-04-15 PROCEDURE — 81001 URINALYSIS AUTO W/SCOPE: CPT

## 2024-04-15 PROCEDURE — 83735 ASSAY OF MAGNESIUM: CPT

## 2024-04-15 PROCEDURE — 85025 COMPLETE CBC W/AUTO DIFF WBC: CPT

## 2024-04-15 RX ORDER — ASPIRIN 81 MG/1
81 TABLET ORAL DAILY
Status: DISCONTINUED | OUTPATIENT
Start: 2024-04-16 | End: 2024-04-18 | Stop reason: HOSPADM

## 2024-04-15 RX ORDER — AMLODIPINE BESYLATE 5 MG/1
5 TABLET ORAL DAILY
Status: DISCONTINUED | OUTPATIENT
Start: 2024-04-16 | End: 2024-04-18 | Stop reason: HOSPADM

## 2024-04-15 RX ORDER — PANTOPRAZOLE SODIUM 40 MG/1
40 TABLET, DELAYED RELEASE ORAL
Status: DISCONTINUED | OUTPATIENT
Start: 2024-04-16 | End: 2024-04-18 | Stop reason: HOSPADM

## 2024-04-15 RX ORDER — NITROGLYCERIN 0.4 MG/1
0.4 TABLET SUBLINGUAL EVERY 5 MIN PRN
Status: DISCONTINUED | OUTPATIENT
Start: 2024-04-15 | End: 2024-04-18 | Stop reason: HOSPADM

## 2024-04-15 RX ORDER — SODIUM CHLORIDE 9 MG/ML
INJECTION, SOLUTION INTRAVENOUS CONTINUOUS
Status: ACTIVE | OUTPATIENT
Start: 2024-04-15 | End: 2024-04-17

## 2024-04-15 RX ORDER — ONDANSETRON 4 MG/1
4 TABLET, ORALLY DISINTEGRATING ORAL EVERY 8 HOURS PRN
Status: DISCONTINUED | OUTPATIENT
Start: 2024-04-15 | End: 2024-04-18 | Stop reason: HOSPADM

## 2024-04-15 RX ORDER — 0.9 % SODIUM CHLORIDE 0.9 %
100 INTRAVENOUS SOLUTION INTRAVENOUS ONCE
Status: COMPLETED | OUTPATIENT
Start: 2024-04-15 | End: 2024-04-15

## 2024-04-15 RX ORDER — ACETAMINOPHEN 325 MG/1
650 TABLET ORAL EVERY 6 HOURS PRN
Status: DISCONTINUED | OUTPATIENT
Start: 2024-04-15 | End: 2024-04-18 | Stop reason: HOSPADM

## 2024-04-15 RX ORDER — MAGNESIUM SULFATE HEPTAHYDRATE 40 MG/ML
2000 INJECTION, SOLUTION INTRAVENOUS PRN
Status: DISCONTINUED | OUTPATIENT
Start: 2024-04-15 | End: 2024-04-18 | Stop reason: HOSPADM

## 2024-04-15 RX ORDER — VITAMIN C
1 TAB ORAL DAILY
Status: DISCONTINUED | OUTPATIENT
Start: 2024-04-16 | End: 2024-04-18 | Stop reason: HOSPADM

## 2024-04-15 RX ORDER — SODIUM CHLORIDE 0.9 % (FLUSH) 0.9 %
10 SYRINGE (ML) INJECTION PRN
Status: DISCONTINUED | OUTPATIENT
Start: 2024-04-15 | End: 2024-04-18 | Stop reason: HOSPADM

## 2024-04-15 RX ORDER — POLYETHYLENE GLYCOL 3350 17 G/17G
17 POWDER, FOR SOLUTION ORAL DAILY PRN
Status: DISCONTINUED | OUTPATIENT
Start: 2024-04-15 | End: 2024-04-18 | Stop reason: HOSPADM

## 2024-04-15 RX ORDER — ATORVASTATIN CALCIUM 20 MG/1
20 TABLET, FILM COATED ORAL DAILY
Status: DISCONTINUED | OUTPATIENT
Start: 2024-04-16 | End: 2024-04-18 | Stop reason: HOSPADM

## 2024-04-15 RX ORDER — ACETAMINOPHEN 650 MG/1
650 SUPPOSITORY RECTAL EVERY 6 HOURS PRN
Status: DISCONTINUED | OUTPATIENT
Start: 2024-04-15 | End: 2024-04-18 | Stop reason: HOSPADM

## 2024-04-15 RX ORDER — ONDANSETRON 2 MG/ML
4 INJECTION INTRAMUSCULAR; INTRAVENOUS EVERY 6 HOURS PRN
Status: DISCONTINUED | OUTPATIENT
Start: 2024-04-15 | End: 2024-04-18 | Stop reason: HOSPADM

## 2024-04-15 RX ORDER — ENOXAPARIN SODIUM 100 MG/ML
40 INJECTION SUBCUTANEOUS DAILY
Status: DISCONTINUED | OUTPATIENT
Start: 2024-04-16 | End: 2024-04-18 | Stop reason: HOSPADM

## 2024-04-15 RX ORDER — POTASSIUM CHLORIDE 20 MEQ/1
40 TABLET, EXTENDED RELEASE ORAL PRN
Status: DISCONTINUED | OUTPATIENT
Start: 2024-04-15 | End: 2024-04-18 | Stop reason: HOSPADM

## 2024-04-15 RX ORDER — ASPIRIN 325 MG
325 TABLET ORAL ONCE
Status: COMPLETED | OUTPATIENT
Start: 2024-04-15 | End: 2024-04-15

## 2024-04-15 RX ORDER — ISOSORBIDE MONONITRATE 60 MG/1
60 TABLET, EXTENDED RELEASE ORAL DAILY
Status: DISCONTINUED | OUTPATIENT
Start: 2024-04-16 | End: 2024-04-18 | Stop reason: HOSPADM

## 2024-04-15 RX ORDER — POTASSIUM CHLORIDE 7.45 MG/ML
10 INJECTION INTRAVENOUS PRN
Status: DISCONTINUED | OUTPATIENT
Start: 2024-04-15 | End: 2024-04-18 | Stop reason: HOSPADM

## 2024-04-15 RX ADMIN — ASPIRIN 325 MG: 325 TABLET ORAL at 17:13

## 2024-04-15 RX ADMIN — IOPAMIDOL 75 ML: 755 INJECTION, SOLUTION INTRAVENOUS at 16:39

## 2024-04-15 RX ADMIN — SODIUM CHLORIDE: 9 INJECTION, SOLUTION INTRAVENOUS at 22:36

## 2024-04-15 RX ADMIN — SODIUM CHLORIDE, PRESERVATIVE FREE 10 ML: 5 INJECTION INTRAVENOUS at 16:39

## 2024-04-15 RX ADMIN — SODIUM CHLORIDE 100 ML: 9 INJECTION, SOLUTION INTRAVENOUS at 16:40

## 2024-04-15 ASSESSMENT — PAIN - FUNCTIONAL ASSESSMENT: PAIN_FUNCTIONAL_ASSESSMENT: NONE - DENIES PAIN

## 2024-04-15 NOTE — ED TRIAGE NOTES
Mode of arrival (squad #, walk in, police, etc) : walk in        Chief complaint(s): Facial droop, delayed responses, fatigue, AMS        Arrival Note (brief scenario, treatment PTA, etc).: Pt reports AMS, fatigue, delayed responses and facial droop started about 30 min. Pt is A&OX4.        C= \"Have you ever felt that you should Cut down on your drinking?\"  No  A= \"Have people Annoyed you by criticizing your drinking?\"  No  G= \"Have you ever felt bad or Guilty about your drinking?\"  No  E= \"Have you ever had a drink as an Eye-opener first thing in the morning to steady your nerves or to help a hangover?\"  No      Deferred []      Reason for deferring: N/A    *If yes to two or more: probable alcohol abuse.*

## 2024-04-15 NOTE — ED NOTES
Report given to Karen MANUEL from PCU.   Report method by phone   The following was reviewed with receiving RN:   BP (!) 114/59   Pulse 70   Temp 97.9 °F (36.6 °C) (Oral)   Resp 17   Ht 1.499 m (4' 11\")   Wt 99.8 kg (220 lb)   SpO2 97%   BMI 44.43 kg/m²     Any medication or safety alerts were reviewed. Any pending diagnostics and notifications were also reviewed, as well as any safety concerns or issues, abnormal labs, abnormal imaging, and abnormal assessment findings. Questions were answered.

## 2024-04-15 NOTE — ED PROVIDER NOTES
EMERGENCY DEPARTMENT ENCOUNTER    Pt Name: Tracey Alarcon  MRN: [id number]  Birthdate 1940  Date of evaluation: 4/15/24  CHIEF COMPLAINT       Chief Complaint   Patient presents with    Facial Droop    Fatigue    Altered Mental Status     At times     HISTORY OF PRESENT ILLNESS     Altered Mental Status  Presenting symptoms: confusion and unresponsiveness    Severity:  Moderate  Most recent episode:  Today  Episode history:  Multiple  Timing:  Intermittent  Progression:  Waxing and waning  Chronicity:  New    URI over the past 1 week, coughing, hard to bring up any phlegm.  She had limited phlegm production today.  Daughter went to check on her because she got a confusing phone call from her at 4 AM.  She went to check on her and she is having some intermittent episodes of unresponsiveness, dazed, staring.  She has been taking over-the-counter cough medicine for this cough.  She has had this cough for about a week.  No falls traumas or injuries.  She takes a baby aspirin.  Daughter was concerned that there might be some facial droop because there was a small amount of brown saliva coming down the left side of her chin.      REVIEW OF SYSTEMS     Review of Systems   Psychiatric/Behavioral:  Positive for confusion.    All other systems reviewed and are negative.    PASTMEDICAL HISTORY     Past Medical History:   Diagnosis Date    Arthritis     Borderline diabetic     controlled with diet    CAD (coronary artery disease)     Constipation     GERD (gastroesophageal reflux disease)     Hyperlipidemia     Hypertension     Incisional hernia     Irregular heart beat     Noted 7-20-21    Mass of soft palate     Noted 7-20-21    Prolonged emergence from general anesthesia     Seasonal allergies     Short of breath on exertion     Wears glasses      Past Problem List  Patient Active Problem List   Diagnosis Code    CAD (coronary artery disease) I25.10    Gastroesophageal reflux disease K21.9    Hypertension I10

## 2024-04-15 NOTE — ED NOTES
Pt having difficulty following commands intermittently, some concerns about swallowing, passed swallow screen, but when med placed in mouth, pt once again had a moment where she was not responding appropriately, not answering questions correctly and pt chewed medication, RN concerned and finger swept medication as it was clearly visible in between lips and teeth

## 2024-04-16 ENCOUNTER — APPOINTMENT (OUTPATIENT)
Dept: NEUROLOGY | Age: 84
DRG: 069 | End: 2024-04-16
Payer: MEDICARE

## 2024-04-16 ENCOUNTER — APPOINTMENT (OUTPATIENT)
Dept: MRI IMAGING | Age: 84
DRG: 069 | End: 2024-04-16
Payer: MEDICARE

## 2024-04-16 LAB
ANION GAP SERPL CALCULATED.3IONS-SCNC: 10 MMOL/L (ref 9–17)
BASOPHILS # BLD: 0 K/UL (ref 0–0.2)
BASOPHILS NFR BLD: 0 % (ref 0–2)
BUN SERPL-MCNC: 14 MG/DL (ref 8–23)
CALCIUM SERPL-MCNC: 8.6 MG/DL (ref 8.6–10.4)
CHLORIDE SERPL-SCNC: 104 MMOL/L (ref 98–107)
CO2 SERPL-SCNC: 25 MMOL/L (ref 20–31)
CREAT SERPL-MCNC: 0.6 MG/DL (ref 0.5–0.9)
EKG ATRIAL RATE: 73 BPM
EKG P AXIS: 15 DEGREES
EKG P-R INTERVAL: 206 MS
EKG Q-T INTERVAL: 388 MS
EKG QRS DURATION: 86 MS
EKG QTC CALCULATION (BAZETT): 427 MS
EKG R AXIS: -42 DEGREES
EKG T AXIS: 10 DEGREES
EKG VENTRICULAR RATE: 73 BPM
EOSINOPHIL # BLD: 0.5 K/UL (ref 0–0.4)
EOSINOPHILS RELATIVE PERCENT: 6 % (ref 0–4)
ERYTHROCYTE [DISTWIDTH] IN BLOOD BY AUTOMATED COUNT: 13.2 % (ref 11.5–14.9)
GFR SERPL CREATININE-BSD FRML MDRD: 90 ML/MIN/1.73M2
GLUCOSE SERPL-MCNC: 104 MG/DL (ref 70–99)
HCT VFR BLD AUTO: 39.1 % (ref 36–46)
HGB BLD-MCNC: 12.6 G/DL (ref 12–16)
LYMPHOCYTES NFR BLD: 1.66 K/UL (ref 1–4.8)
LYMPHOCYTES RELATIVE PERCENT: 20 % (ref 24–44)
MCH RBC QN AUTO: 29.4 PG (ref 26–34)
MCHC RBC AUTO-ENTMCNC: 32.4 G/DL (ref 31–37)
MCV RBC AUTO: 90.9 FL (ref 80–100)
MICROORGANISM SPEC CULT: NORMAL
MONOCYTES NFR BLD: 1.41 K/UL (ref 0.1–1.3)
MONOCYTES NFR BLD: 17 % (ref 1–7)
MORPHOLOGY: NORMAL
NEUTROPHILS NFR BLD: 57 % (ref 36–66)
NEUTS SEG NFR BLD: 4.73 K/UL (ref 1.3–9.1)
PLATELET # BLD AUTO: 277 K/UL (ref 150–450)
PMV BLD AUTO: 7.9 FL (ref 6–12)
POTASSIUM SERPL-SCNC: 4.2 MMOL/L (ref 3.7–5.3)
RBC # BLD AUTO: 4.3 M/UL (ref 4–5.2)
SODIUM SERPL-SCNC: 139 MMOL/L (ref 135–144)
SPECIMEN DESCRIPTION: NORMAL
WBC OTHER # BLD: 8.3 K/UL (ref 3.5–11)

## 2024-04-16 PROCEDURE — 70553 MRI BRAIN STEM W/O & W/DYE: CPT

## 2024-04-16 PROCEDURE — 95819 EEG AWAKE AND ASLEEP: CPT

## 2024-04-16 PROCEDURE — 95819 EEG AWAKE AND ASLEEP: CPT | Performed by: PSYCHIATRY & NEUROLOGY

## 2024-04-16 PROCEDURE — 6370000000 HC RX 637 (ALT 250 FOR IP): Performed by: FAMILY MEDICINE

## 2024-04-16 PROCEDURE — 2580000003 HC RX 258: Performed by: FAMILY MEDICINE

## 2024-04-16 PROCEDURE — 80048 BASIC METABOLIC PNL TOTAL CA: CPT

## 2024-04-16 PROCEDURE — 93010 ELECTROCARDIOGRAM REPORT: CPT | Performed by: INTERNAL MEDICINE

## 2024-04-16 PROCEDURE — 6360000002 HC RX W HCPCS: Performed by: FAMILY MEDICINE

## 2024-04-16 PROCEDURE — 2060000000 HC ICU INTERMEDIATE R&B

## 2024-04-16 PROCEDURE — 36415 COLL VENOUS BLD VENIPUNCTURE: CPT

## 2024-04-16 PROCEDURE — 85025 COMPLETE CBC W/AUTO DIFF WBC: CPT

## 2024-04-16 RX ADMIN — ASPIRIN 81 MG: 81 TABLET, COATED ORAL at 10:39

## 2024-04-16 RX ADMIN — ENOXAPARIN SODIUM 40 MG: 100 INJECTION SUBCUTANEOUS at 10:39

## 2024-04-16 RX ADMIN — ISOSORBIDE MONONITRATE 60 MG: 60 TABLET, EXTENDED RELEASE ORAL at 11:45

## 2024-04-16 RX ADMIN — PANTOPRAZOLE SODIUM 40 MG: 40 TABLET, DELAYED RELEASE ORAL at 05:53

## 2024-04-16 RX ADMIN — SODIUM CHLORIDE: 9 INJECTION, SOLUTION INTRAVENOUS at 22:37

## 2024-04-16 RX ADMIN — ATORVASTATIN CALCIUM 20 MG: 20 TABLET, FILM COATED ORAL at 10:39

## 2024-04-16 RX ADMIN — Medication 1 TABLET: at 10:39

## 2024-04-16 NOTE — ACP (ADVANCE CARE PLANNING)
Advance Care Planning     Advance Care Planning Activator (Inpatient)  Conversation Note      Date of ACP Conversation: 4/16/2024     Conversation Conducted with: Patient with Decision Making Capacity    ACP Activator: Karina Obrien RN    Health Care Decision Maker:     Current Designated Health Care Decision Maker:     Click here to complete Healthcare Decision Makers including section of the Healthcare Decision Maker Relationship (ie \"Primary\")  Today we documented Decision Maker(s) consistent with Legal Next of Kin hierarchy. Patient states it is both children, however son Demario is listed as \"legal guardian.\" Requested paperwork.    Care Preferences    Ventilation:  \"If you were in your present state of health and suddenly became very ill and were unable to breathe on your own, what would your preference be about the use of a ventilator (breathing machine) if it were available to you?\"      Would the patient desire the use of ventilator (breathing machine)?: yes    \"If your health worsens and it becomes clear that your chance of recovery is unlikely, what would your preference be about the use of a ventilator (breathing machine) if it were available to you?\"     Would the patient desire the use of ventilator (breathing machine)?: No      Resuscitation  \"CPR works best to restart the heart when there is a sudden event, like a heart attack, in someone who is otherwise healthy. Unfortunately, CPR does not typically restart the heart for people who have serious health conditions or who are very sick.\"    \"In the event your heart stopped as a result of an underlying serious health condition, would you want attempts to be made to restart your heart (answer \"yes\" for attempt to resuscitate) or would you prefer a natural death (answer \"no\" for do not attempt to resuscitate)?\" yes       [] Yes   [] No   Educated Patient / Decision Maker regarding differences between Advance Directives and portable DNR

## 2024-04-16 NOTE — PLAN OF CARE
Problem: Safety - Adult  Goal: Free from fall injury  4/16/2024 1729 by Pretty Godwin, RN  Outcome: Progressing     Problem: ABCDS Injury Assessment  Goal: Absence of physical injury  4/16/2024 1729 by Pretty Godwin, RN  Outcome: Progressing     Problem: Skin/Tissue Integrity  Goal: Absence of new skin breakdown  Description: 1.  Monitor for areas of redness and/or skin breakdown  2.  Assess vascular access sites hourly  3.  Every 4-6 hours minimum:  Change oxygen saturation probe site  4.  Every 4-6 hours:  If on nasal continuous positive airway pressure, respiratory therapy assess nares and determine need for appliance change or resting period.  Outcome: Progressing

## 2024-04-16 NOTE — VIRTUAL HEALTH
Neuroendovascular brief note    84 yo woman with workup for fatigue and facial droop - cta head obtained with incidental left cavenrous 3x5mm aneurysm. Being admitted to [facility] for further medical and neuro workup.    Followup outpatient with Dr. Braden for the incidental asymptomatic left cavernous ica aneurysm to discuss any further imaging such as DSA                Americo Braden MD

## 2024-04-16 NOTE — H&P
Hospital Medicine  History and Physical                                                                                                                                                 Full Code    Patient:  Tracey Alarcon  MRN: [id number]                                                                                                                                                                     CHIEF COMPLAINT:  tia    History Obtained From:  patient  PCP: Sergei Montelongo MD    HISTORY OF PRESENT ILLNESS:   The patient is a 83 y.o. female who presents with h/o of tia symptoms, pt has h/o of htn and has been having altered mental status, pt was seen in ER with with some confusion, pt has htn and cad, pt recently had cold symptoms  Family noted some facial droop and was seen in ER      Past Medical History:        Diagnosis Date    Arthritis     Borderline diabetic     controlled with diet    CAD (coronary artery disease)     Constipation     GERD (gastroesophageal reflux disease)     Hyperlipidemia     Hypertension     Incisional hernia     Irregular heart beat     Noted 7-20-21    Mass of soft palate     Noted 7-20-21    Prolonged emergence from general anesthesia     Seasonal allergies     Short of breath on exertion     Wears glasses        Past Surgical History:        Procedure Laterality Date    CARPAL TUNNEL RELEASE Bilateral     2 times on the right and 2 times on the left    CHOLECYSTECTOMY      COLONOSCOPY N/A 7/20/2021    COLONOSCOPY DIAGNOSTIC performed by Rajan Mckeon MD at [facility] ENDO    EYE SURGERY Right     Procedure done to right eye    HYSTERECTOMY (CERVIX STATUS UNKNOWN)  1979    \"they took part of the uterus.  I still have periods but they were every other month\", doesn't know why- no longer having menses as of 7-20-21    KNEE ARTHROSCOPY Left     NECK SURGERY  2001    titanium screw in neck by Dr. Herndon    ROTATOR CUFF REPAIR Right     VENTRAL HERNIA REPAIR N/A 04/05/2019

## 2024-04-16 NOTE — CONSULTS
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  She has had upper respiratory tract infection symptoms for the past 1 week and when daughter went to check on her after a confusing phone call around 4 AM she had an episode of confusion and staring.  She has been taking over-the-counter cough medication for the cough.  Daughter was concerned that there might be some facial droop because there was a small amount of brown saliva coming down the left side of her chin.  ECG showed sinus rhythm, left axis, no obvious ischemic  CTA head and neck was unremarkable except incidental finding of left cavernous ICA 3 x 5 mm aneurysm    On admission blood pressure 134/64, heart rate 74, temperature 97.9, oxygen saturation 98  High-sensitivity troponin 7, albumin 3.9  WBC 9.8, hemoglobin 13.6, neutrophil 63, platelets 276  Sodium 141, potassium 4.5, BUN 19, creatinine 0.7, magnesium 2 point    Current evaluation  Patient seen and examined  Morbidly obese female    Investigation workup    ECG 4/15/2024  Normal sinus rhythm, heart rate 73, left axis deviation, no significant change from previous ECG    2D echo

## 2024-04-16 NOTE — PROCEDURES
Q6H PRN Sergei Montelongo MD        polyethylene glycol (GLYCOLAX) packet 17 g  17 g Oral Daily PRN Sergei Montelongo MD        acetaminophen (TYLENOL) tablet 650 mg  650 mg Oral Q6H PRN Sergei Montelongo MD        Or    acetaminophen (TYLENOL) suppository 650 mg  650 mg Rectal Q6H PRN Sergei Montelongo MD        0.9 % sodium chloride infusion   IntraVENous Continuous Sergei Montelongo MD 75 mL/hr at 04/15/24 2236 New Bag at 04/15/24 2236    enoxaparin (LOVENOX) injection 40 mg  40 mg SubCUTAneous Daily Sergei Montelongo MD   40 mg at 04/16/24 1039     Technical Description: This is a 21 channel digital EEG recording with time-locked video. Electrodes were placed in accordance with the 10-20 International System of Electrode Placement. Single lead EKG monitoring as well as temporal electrodes were included.    The patient was not sleep deprived. This recording was obtained during wakefulness.  EEG Description: The dominant background activity during maximal recorded wakefulness consisted of 4-5 Hz of polymorphic delta and theta activity of 25 to 35 µV.  The background was symmetric and continuous. There was poor anterior posterior amplitude gradient reaching up to 7 Hz frequencies.  Sleep architecture was seen with poorly formed vertex waves and sleep spindles.    Activation procedures were  performed.     The EKG channel demonstrated a normal sinus rhythm.    Interpretation  This EEG was abnormal due to disorganized and slow background in polymorphic delta and theta frequency.      Clinical correlation  This EEG was abnormal. Disorganized and slow background suggested mild to moderate encephalopathy of non specific etiology.     Humphrey Johnson MD  [facility] [place]  Neurology

## 2024-04-16 NOTE — CARE COORDINATION
Case Management Assessment  Initial Evaluation    Date/Time of Evaluation: 4/16/2024 9:25 AM  Assessment Completed by: Karina Obrien RN    If patient is discharged prior to next notation, then this note serves as note for discharge by case management.    Patient Name: Tracey Alarcon                   YOB: 1940  Diagnosis: TIA (transient ischemic attack) [G45.9]                   Date / Time: 4/15/2024  2:26 PM    Patient Admission Status: Inpatient   Readmission Risk (Low < 19, Mod (19-27), High > 27): Readmission Risk Score: 10.1    Current PCP: Sergei Montelongo MD  PCP verified by CM? Yes    Chart Reviewed: Yes      History Provided by: Patient  Patient Orientation: Alert and Oriented    Patient Cognition: Alert    Hospitalization in the last 30 days (Readmission):  No    If yes, Readmission Assessment in  Navigator will be completed.    Advance Directives:      Code Status: Full Code   Patient's Primary Decision Maker is: Legal Next of Kin      Discharge Planning:    Patient lives with: Alone Type of Home: House  Primary Care Giver: Self  Patient Support Systems include: Children, Friends/Neighbors   Current Financial resources: Medicare  Current community resources: None  Current services prior to admission: Durable Medical Equipment            Current DME: Cane, Walker            Type of Home Care services:  None    ADLS  Prior functional level: Assistance with the following:, Shopping, Mobility, Housework, Bathing  Current functional level: Assistance with the following:, Housework, Shopping, Bathing, Mobility    PT AM-PAC:   /24  OT AM-PAC:   /24    Family can provide assistance at DC: Yes  Would you like Case Management to discuss the discharge plan with any other family members/significant others, and if so, who? Yes (Son, Demario)  Plans to Return to Present Housing: Unknown at present  Other Identified Issues/Barriers to RETURNING to current housing: Mentation.  Potential

## 2024-04-16 NOTE — CONSULTS
[facility] Neurology   IN-PATIENT SERVICE      NEUROLOGY CONSULT  NOTE            Date:   4/16/2024  Patient name:  Tracey Alarcon  Date of admission:  4/15/2024  YOB: 1940      Chief Complaint:     Chief Complaint   Patient presents with    Facial Droop    Fatigue    Altered Mental Status     At times       Reason for Consult:      TIA    History of Present Illness:     The patient is a 83 y.o. female who presents with Facial Droop, Fatigue, and Altered Mental Status (At times)  . The patient was seen and examined and the chart was reviewed.     This is a 83 year old female with L cavernous aneurysm found incidentally, CAD, HLD, HTN who presented to the ED with confusion. She has had URI symptoms for the past week and when daughter went to check on her after a confusing phone call around 4 am. She had an episode of confusion and staring. he has been taking over-the-counter cough medicine for this cough. Daughter was concerned that there might be some facial droop because there was a small amount of brown saliva coming down the left side of her chin.     Past Medical History:     Past Medical History:   Diagnosis Date    Arthritis     Borderline diabetic     controlled with diet    CAD (coronary artery disease)     Constipation     GERD (gastroesophageal reflux disease)     Hyperlipidemia     Hypertension     Incisional hernia     Irregular heart beat     Noted 7-20-21    Mass of soft palate     Noted 7-20-21    Prolonged emergence from general anesthesia     Seasonal allergies     Short of breath on exertion     Wears glasses         Past Surgical History:     Past Surgical History:   Procedure Laterality Date    CARPAL TUNNEL RELEASE Bilateral     2 times on the right and 2 times on the left    CHOLECYSTECTOMY      COLONOSCOPY N/A 7/20/2021    COLONOSCOPY DIAGNOSTIC performed by Rajan Mckeon MD at [facility] ENDO    EYE SURGERY Right     Procedure done to right eye    HYSTERECTOMY (CERVIX STATUS

## 2024-04-17 LAB
CHOLEST SERPL-MCNC: 136 MG/DL
CHOLESTEROL/HDL RATIO: 2.9
HDLC SERPL-MCNC: 47 MG/DL
LDLC SERPL CALC-MCNC: 73 MG/DL (ref 0–130)
TRIGL SERPL-MCNC: 82 MG/DL

## 2024-04-17 PROCEDURE — 2060000000 HC ICU INTERMEDIATE R&B

## 2024-04-17 PROCEDURE — 6370000000 HC RX 637 (ALT 250 FOR IP): Performed by: FAMILY MEDICINE

## 2024-04-17 PROCEDURE — 6360000002 HC RX W HCPCS: Performed by: FAMILY MEDICINE

## 2024-04-17 PROCEDURE — 97116 GAIT TRAINING THERAPY: CPT

## 2024-04-17 PROCEDURE — 36415 COLL VENOUS BLD VENIPUNCTURE: CPT

## 2024-04-17 PROCEDURE — 97162 PT EVAL MOD COMPLEX 30 MIN: CPT

## 2024-04-17 PROCEDURE — 97166 OT EVAL MOD COMPLEX 45 MIN: CPT

## 2024-04-17 PROCEDURE — 97530 THERAPEUTIC ACTIVITIES: CPT

## 2024-04-17 PROCEDURE — 80061 LIPID PANEL: CPT

## 2024-04-17 RX ORDER — AMLODIPINE BESYLATE 5 MG/1
5 TABLET ORAL NIGHTLY
Qty: 90 TABLET | Refills: 0 | Status: SHIPPED | OUTPATIENT
Start: 2024-04-17

## 2024-04-17 RX ADMIN — ISOSORBIDE MONONITRATE 60 MG: 60 TABLET, EXTENDED RELEASE ORAL at 09:06

## 2024-04-17 RX ADMIN — ENOXAPARIN SODIUM 40 MG: 100 INJECTION SUBCUTANEOUS at 09:08

## 2024-04-17 RX ADMIN — ASPIRIN 81 MG: 81 TABLET, COATED ORAL at 09:06

## 2024-04-17 RX ADMIN — Medication 1 TABLET: at 09:06

## 2024-04-17 RX ADMIN — ATORVASTATIN CALCIUM 20 MG: 20 TABLET, FILM COATED ORAL at 21:03

## 2024-04-17 RX ADMIN — PANTOPRAZOLE SODIUM 40 MG: 40 TABLET, DELAYED RELEASE ORAL at 06:09

## 2024-04-17 RX ADMIN — AMLODIPINE BESYLATE 5 MG: 5 TABLET ORAL at 09:06

## 2024-04-17 NOTE — DISCHARGE SUMMARY
XR CHEST PORTABLE    Result Date: 4/15/2024  EXAMINATION: ONE XRAY VIEW OF THE CHEST 4/15/2024 2:37 pm COMPARISON: 12/13/2023 HISTORY: ORDERING SYSTEM PROVIDED HISTORY: cough 1 week TECHNOLOGIST PROVIDED HISTORY: cough 1 week Reason for Exam: cough X 1 week, altered mental status FINDINGS: There is a calcified granuloma in the the left lung base.  There is some nonspecific increased markings in the the right lung, unchanged.  There is no confluent infiltrate.  The heart appears unremarkable.  There is osteoarthritis of the shoulders.     Nonspecific increased markings in the right lung, unchanged. No confluent infiltrate.       Disposition:   home    Discharge Instructions:  Activity: activity as tolerated  Diet:  regular diet    Follow up with Sergei Montelongo MD in 1 weeks.    Signed:  Sergei Montelongo MD  4/18/2024, 4:42 PM    Time spent in discharge of this pt is more than 30 minutes in examination,evaluvation,  counseling and review of medication and discharge plan

## 2024-04-17 NOTE — PLAN OF CARE
Problem: Discharge Planning  Goal: Discharge to home or other facility with appropriate resources  Outcome: Progressing     Problem: Safety - Adult  Goal: Free from fall injury  4/17/2024 0417 by Katie Boone RN  Outcome: Progressing       Problem: ABCDS Injury Assessment  Goal: Absence of physical injury  4/17/2024 0417 by Katie Boone RN  Outcome: Progressing    Problem: Skin/Tissue Integrity  Goal: Absence of new skin breakdown  Description: 1.  Monitor for areas of redness and/or skin breakdown  2.  Assess vascular access sites hourly  3.  Every 4-6 hours minimum:  Change oxygen saturation probe site  4.  Every 4-6 hours:  If on nasal continuous positive airway pressure, respiratory therapy assess nares and determine need for appliance change or resting period.  4/17/2024 0417 by Katie Boone RN  Outcome: Progressing

## 2024-04-17 NOTE — CARE COORDINATION
ONGOING DISCHARGE PLAN:    Patient is alert and oriented x4.    Spoke with patient regarding discharge plan and patient confirms that plan is still to return home. Explained that the patient would benefit from continued therapy. FOC list provided for [facility], however the patient states she is current with [facility] and would like to continue with them.    Daughter is able to assist in the home, as well as neighbor.     Neurology on board-MRI and EEG unrevealing.     PT/OT recommend home w/assist.                        Post Acute Facility/Agency List     Provided patient with the following list, the list includes the overall star ratings obtained from CMS per the Medicare Web site (www.Medicare.gov):     [] Long Term Acute Care Facilities  [] Acute Inpatient Rehabilitation Facilities  [] Skilled Nursing Facilities  [x] Home Care    Provided verbal instructions on how to utilize the QR Code to obtain additional detailed star ratings from www.Medicare.gov     offered to print and provide the detailed list:    [x]Accepted   []Declined    The following printed detailed lists were provided:     Home Care    Will continue to follow for additional discharge needs.    If patient is discharged prior to next notation, then this note serves as note for discharge by case management.    Electronically signed by Karina Obrien RN on 4/17/2024 at 12:44 PM

## 2024-04-18 VITALS
HEART RATE: 88 BPM | OXYGEN SATURATION: 98 % | WEIGHT: 220 LBS | RESPIRATION RATE: 18 BRPM | DIASTOLIC BLOOD PRESSURE: 84 MMHG | BODY MASS INDEX: 44.35 KG/M2 | HEIGHT: 59 IN | TEMPERATURE: 98 F | SYSTOLIC BLOOD PRESSURE: 177 MMHG

## 2024-04-18 LAB
ANION GAP SERPL CALCULATED.3IONS-SCNC: 10 MMOL/L (ref 9–17)
BNP SERPL-MCNC: 278 PG/ML
BUN SERPL-MCNC: 18 MG/DL (ref 8–23)
CALCIUM SERPL-MCNC: 9.7 MG/DL (ref 8.6–10.4)
CHLORIDE SERPL-SCNC: 100 MMOL/L (ref 98–107)
CO2 SERPL-SCNC: 28 MMOL/L (ref 20–31)
CREAT SERPL-MCNC: 0.8 MG/DL (ref 0.5–0.9)
GFR SERPL CREATININE-BSD FRML MDRD: 74 ML/MIN/1.73M2
GLUCOSE SERPL-MCNC: 151 MG/DL (ref 70–99)
POTASSIUM SERPL-SCNC: 5 MMOL/L (ref 3.7–5.3)
SODIUM SERPL-SCNC: 138 MMOL/L (ref 135–144)

## 2024-04-18 PROCEDURE — 83880 ASSAY OF NATRIURETIC PEPTIDE: CPT

## 2024-04-18 PROCEDURE — 80048 BASIC METABOLIC PNL TOTAL CA: CPT

## 2024-04-18 PROCEDURE — 97535 SELF CARE MNGMENT TRAINING: CPT

## 2024-04-18 PROCEDURE — 6370000000 HC RX 637 (ALT 250 FOR IP): Performed by: FAMILY MEDICINE

## 2024-04-18 PROCEDURE — 36415 COLL VENOUS BLD VENIPUNCTURE: CPT

## 2024-04-18 PROCEDURE — 6360000002 HC RX W HCPCS: Performed by: FAMILY MEDICINE

## 2024-04-18 RX ORDER — CARVEDILOL 6.25 MG/1
6.25 TABLET ORAL 2 TIMES DAILY
Qty: 60 TABLET | Refills: 3 | Status: SHIPPED | OUTPATIENT
Start: 2024-04-18

## 2024-04-18 RX ORDER — CARVEDILOL 6.25 MG/1
6.25 TABLET ORAL 2 TIMES DAILY WITH MEALS
Status: DISCONTINUED | OUTPATIENT
Start: 2024-04-18 | End: 2024-04-18 | Stop reason: HOSPADM

## 2024-04-18 RX ADMIN — PANTOPRAZOLE SODIUM 40 MG: 40 TABLET, DELAYED RELEASE ORAL at 06:36

## 2024-04-18 RX ADMIN — ATORVASTATIN CALCIUM 20 MG: 20 TABLET, FILM COATED ORAL at 08:07

## 2024-04-18 RX ADMIN — ASPIRIN 81 MG: 81 TABLET, COATED ORAL at 08:07

## 2024-04-18 RX ADMIN — ENOXAPARIN SODIUM 40 MG: 100 INJECTION SUBCUTANEOUS at 08:07

## 2024-04-18 RX ADMIN — AMLODIPINE BESYLATE 5 MG: 5 TABLET ORAL at 08:07

## 2024-04-18 RX ADMIN — ISOSORBIDE MONONITRATE 60 MG: 60 TABLET, EXTENDED RELEASE ORAL at 08:07

## 2024-04-18 RX ADMIN — Medication 1 TABLET: at 08:07

## 2024-04-18 NOTE — CARE COORDINATION
ONGOING DISCHARGE PLAN:    Patient is alert and oriented x4.    Spoke with patient regarding discharge plan and patient confirms that plan is still to return home. Patient states her neighbor and her daughter assist her with any needs. Patient states that she is current with [facility] therapy for her arm and does not wish to have visiting nurse services.    DME: Cane x2, walker, rollator, GB outside shower.     VNS: Denies.     Will continue to follow for additional discharge needs.    If patient is discharged prior to next notation, then this note serves as note for discharge by case management.    Electronically signed by Karina Obrien RN on 4/18/2024 at 9:10 AM

## 2024-04-18 NOTE — DISCHARGE INSTR - COC
Hypercholesterolemia E78.00    Osteoarthritis of right glenohumeral joint M19.011    Chest discomfort R07.89    Mixed hyperlipidemia E78.2    Class 3 severe obesity without serious comorbidity with body mass index (BMI) of 45.0 to 49.9 in adult (HCC) E66.01, Z68.42    SOB (shortness of breath) R06.02    Status post hysterectomy Z90.710    S/P lateral meniscal repair Z98.890    Encounter for cholecystectomy Z76.89    Bronchitis J40    RAD (reactive airway disease), mild intermittent, uncomplicated J45.20    TIA (transient ischemic attack) G45.9       Isolation/Infection:   Isolation            No Isolation          Patient Infection Status       None to display                     Nurse Assessment:  Last Vital Signs: BP (!) 149/89   Pulse 84   Temp 98.1 °F (36.7 °C) (Oral)   Resp 18   Ht 1.499 m (4' 11\")   Wt 99.8 kg (220 lb)   SpO2 98%   BMI 44.43 kg/m²     Last documented pain score (0-10 scale):    Last Weight:   Wt Readings from Last 1 Encounters:   04/15/24 99.8 kg (220 lb)     Mental Status:  {IP PT MENTAL STATUS:20030}    IV Access:  { ELO IV ACCESS:648688268}    Nursing Mobility/ADLs:  Walking   {CHP DME ADLs:232812018}  Transfer  {CHP DME ADLs:262371635}  Bathing  {CHP DME ADLs:669169521}  Dressing  {CHP DME ADLs:562439633}  Toileting  {CHP DME ADLs:466170720}  Feeding  {P DME ADLs:275320886}  Med Admin  {P DME ADLs:152737405}  Med Delivery   { ELO MED Delivery:769388445}    Wound Care Documentation and Therapy:  Incision 04/05/19 Abdomen (Active)   Number of days: 1840        Elimination:  Continence:   Bowel: {YES / NO:19727}  Bladder: {YES / NO:19727}  Urinary Catheter: {Urinary Catheter:300122806}   Colostomy/Ileostomy/Ileal Conduit: {YES / NO:19727}       Date of Last BM: ***    Intake/Output Summary (Last 24 hours) at 4/18/2024 1657  Last data filed at 4/18/2024 1131  Gross per 24 hour   Intake 240 ml   Output 1400 ml   Net -1160 ml     I/O last 3 completed shifts:  In: 240

## 2024-04-18 NOTE — PROGRESS NOTES
/okay to give Norvasc per neurology   
[facility]   Occupational Therapy Evaluation  Date: 24  Patient Name: Tracey Alarcon       Room:   MRN: [id number]  Account: [id number]   : 1940  (83 y.o.) Gender: female     Discharge Recommendations:  Further Occupational Therapy is recommended upon facility discharge.    OT Equipment Recommendations  Other: TBD    Referring Practitioner: Sergei Montelongo MD  Diagnosis: TIA        Treatment Diagnosis: Impaired self care status    Past Medical History:  has a past medical history of Arthritis, Borderline diabetic, CAD (coronary artery disease), Constipation, GERD (gastroesophageal reflux disease), Hyperlipidemia, Hypertension, Incisional hernia, Irregular heart beat, Mass of soft palate, Prolonged emergence from general anesthesia, Seasonal allergies, Short of breath on exertion, and Wears glasses.    Past Surgical History:   has a past surgical history that includes Knee arthroscopy (Left); Neck surgery (); Rotator cuff repair (Right); Cholecystectomy; Carpal tunnel release (Bilateral); Hysterectomy (); ventral hernia repair (N/A, 2019); Eye surgery (Right); and Colonoscopy (N/A, 2021).    Restrictions  Restrictions/Precautions  Restrictions/Precautions: Up as Tolerated, Fall Risk (IV left antecubital, external urinary catheter)  Required Braces or Orthoses?: No  Implants present? : Metal implants (titanium screw in cervical neck)  Position Activity Restriction  Other position/activity restrictions: up as tolerated- use lift equipment      Vitals  Vitals  O2 Device: None (Room air)     Subjective  Subjective: \"I would just stare off into space and not remember things that I do everyday.\" pt was pleasant and agreeable to OT/PT eval  Comments: OK per RN for OT/PT eval  Subjective  Pain: right shoulder- chronic pain since - did not rate pain level even when asked several times      Social/Functional History  Social/Functional History  Lives 
Deepali Marlow DO (neuro) notified of consult.   
Dr Kashmir skelton served MRI results.  
Dr Montelongo paged and notified via phone call of new patient and no orders.   
EEG is completed.  Results to follow.  
Neuro consult received from Dr. Montelongo. See orders for details   
PT/OT ordered. See orders for details   
Patient family told writer that patient has \"episodes\" of \"spacing out\". During this time, patient stares off, no blinking, and trouble forming words.Writer informed family to call writer or other nurse in to witness.     UPDATE: Patient has had 4 \"episodes\" by 1300, one witness by charge RN and other witnessed by nursing aid. NIH score remains a ZERO. \"Episode\" lasted less than one minute. Dr. Marlow made aware. New orders for EEG and MRI in place.   
Patient in chair , chair alarm on  
Per Dr. Montelongo, pt. Ok for discharge despite blood pressure.   
Pt arrived to floor from ED and was transfered to bed.  Vitals taken, telemetry applied. Assessment complete. No distress noted. Call light within reach, and pt educated on its use. Bed in lowest position, and locked. Side rails up x 2. Denied further questions or needs at this time. Will continue to monitor.  
Told Primary about patient \"episode\" at bathroom. Patient had orders for discharge. Patient became very confused in bathroom. Primary is holding discharge and wanted neurology aware. Writer notified Dr. Marlow.  
  simvastatin (ZOCOR) 40 MG tablet   No No   Sig: TAKE 1 TABLET BY MOUTH EVERY DAY   vitamin D (CHOLECALCIFEROL) 25 MCG (1000 UT) TABS tablet   Yes No   Sig: Take 1 tablet by mouth daily   zinc gluconate 50 MG tablet   Yes No   Sig: Take 1 tablet by mouth daily      Facility-Administered Medications: None           Electronically signed by Zarina Barajas RPH on 4/15/2024 at 5:15 PM                                                     
IMPRESSION:  1.  No acute intracranial bleed.  2. Mild white matter disease described is typical of microvascular ischemic  disease or as sequela of dysmyelinating/demyelinating processes.   MRI of the brain shows IMPRESSION:  1.  No acute intracranial abnormality.  2. Mild chronic white matter microvascular ischemic changes.  Subjective  Subjective: Pt reports that  she   was  having trouble talking and remembering things.  Pt called her dtr at 400 am thinking it was the afternoon.  Dtr had discribed it as \"spacing out.\"         Social/Functional History  Social/Functional History  Lives With: Alone  Type of Home: House (modular home)  Home Layout: One level  Home Access: Stairs to enter with rails  Entrance Stairs - Number of Steps: 5 ELROY w/ left rail in front, 5 ELROY in back w/ right rail  Entrance Stairs - Rails: Right  Bathroom Shower/Tub: Walk-in shower, Curtain  Bathroom Toilet: Standard (has grab bar on the wall; has BSC in bedroom next to the bed)  Bathroom Equipment: Hand-held shower  Bathroom Accessibility: Not accessible (23\" doorway)  Home Equipment: Cane, Walker, rolling, Rollator, Grab bars (rollator is her son's but she is able to use it)  Has the patient had two or more falls in the past year or any fall with injury in the past year?: Yes (reports multiple falls prior to admission)  Receives Help From: Family, Neighbor  ADL Assistance: Independent  Homemaking Assistance: Needs assistance (dtr orders groceries from [employer], states she does her own cooking, cleaning and laundry)  Meal Prep: Minimal  Laundry: Moderate  Vacuuming: Total  Cleaning: Total  Gardening: Total  Yard Work: Total  Driving: Total  Shopping: Total  : Other (comment) (NA)  Other (Comment): Other (comment) (NA)  Homemaking Responsibilities: Yes (dtr orders groceries from [employer], states she does her own cooking, cleaning and laundry)  Ambulation Assistance: Independent (uses wheeled walker or cane)  Transfer Assistance: 
Devices: All fall risk precautions in place, Call light within reach, Chair alarm in place, Patient at risk for falls, Left in bed    AM-PAC Daily Activities Inpatient  AM-PAC Daily Activity - Inpatient   How much help is needed for putting on and taking off regular lower body clothing?: A Little  How much help is needed for bathing (which includes washing, rinsing, drying)?: A Little  How much help is needed for toileting (which includes using toilet, bedpan, or urinal)?: A Little  How much help is needed for putting on and taking off regular upper body clothing?: A Little  How much help is needed for taking care of personal grooming?: A Little  How much help for eating meals?: A Little  AM-PAC Inpatient Daily Activity Raw Score: 18  AM-PAC Inpatient ADL T-Scale Score : 38.66  ADL Inpatient CMS 0-100% Score: 46.65  ADL Inpatient CMS G-Code Modifier : CK     04/18/24 1500   OT Individual Minutes   Time In 1406   Time Out 1501   Minutes 55       Electronically signed by JULIO ZAVALETA on 4/18/24 at 3:34 PM EDT     This patient  Tracey Alarcon  was seen by the Occupational Therapy Student identified above, including any treatment and documentation activity.  The above documentation completed by SHAY Student  was reviewed and accepted by the Supervising Clinical Instructor   Electronically signed by KRYSTIAN Gutiérrez on 4/18/24 at 3:53 PM EDT  
headache  Decision Support Exception - unselect if not a suspected or confirmed  emergency medical condition->Emergency Medical Condition (MA)  Reason for Exam: Facial Droop; Fatigue; Altered Mental Status. TIA, headache    FINDINGS:  BRAIN/VENTRICLES: There is no acute intracranial hemorrhage, mass effect or  midline shift.  No abnormal extra-axial fluid collection.  The gray-white  differentiation is maintained without evidence of an acute infarct.  There is  no evidence of hydrocephalus. Mild bifrontal and biparietal periventricular  and subcortical white matter hypoattenuation is noted.    ORBITS: The visualized portion of the orbits demonstrate no acute abnormality.    SINUSES: The visualized paranasal sinuses and mastoid air cells demonstrate  no acute abnormality.    SOFT TISSUES/SKULL:  No acute abnormality of the visualized skull or soft  tissues.    Impression  1.  No acute intracranial bleed.  2. Mild white matter disease described is typical of microvascular ischemic  disease or as sequela of dysmyelinating/demyelinating processes.    I personally reviewed all of the above medications, clinical laboratory, imaging and other diagnostic tests.     Impression:       This is an 83 year old female who presented to the ED with staring off episode, confusion, trouble speaking, noted by her daughter. She has had URI symptoms this past week.  Nursing and family noted episode of staring/ confusion spell while admitted. No overt LOC or jerking movements noted. Currently exam is non-focal and spells not witnessed.      Plan:      EEG did not show any epileptiform activity   MRI Brain with and without contrast WNL  Hold on AED given no further spells and the above is unrevealing  Plan for follow-up with NEV for incidental left cavenrous 3x5mm aneurysm   Discussed with patient and nursing staff at bedside  Neuro-checks  Will sign off. Optimize stroke risk factors, continue ASA and Lipitor, ECHO, lipid panel, hgba1c. 
headache  TECHNOLOGIST PROVIDED HISTORY:    TIA, headache  Decision Support Exception - unselect if not a suspected or confirmed  emergency medical condition->Emergency Medical Condition (MA)  Reason for Exam: Facial Droop; Fatigue; Altered Mental Status. TIA, headache    FINDINGS:  BRAIN/VENTRICLES: There is no acute intracranial hemorrhage, mass effect or  midline shift.  No abnormal extra-axial fluid collection.  The gray-white  differentiation is maintained without evidence of an acute infarct.  There is  no evidence of hydrocephalus. Mild bifrontal and biparietal periventricular  and subcortical white matter hypoattenuation is noted.    ORBITS: The visualized portion of the orbits demonstrate no acute abnormality.    SINUSES: The visualized paranasal sinuses and mastoid air cells demonstrate  no acute abnormality.    SOFT TISSUES/SKULL:  No acute abnormality of the visualized skull or soft  tissues.    Impression  1.  No acute intracranial bleed.  2. Mild white matter disease described is typical of microvascular ischemic  disease or as sequela of dysmyelinating/demyelinating processes.    I personally reviewed all of the above medications, clinical laboratory, imaging and other diagnostic tests.     Impression:       This is an 83 year old female who presented to the ED with staring off episode, confusion, trouble speaking, noted by her daughter. She has had URI symptoms this past week.  Nursing and family noted episodes of staring/ confusion spells while admitted. No overt LOC or jerking movements noted. Currently exam is non-focal and spells not witnessed. Question underlying neurocognitive decline/ condition that is undiagnosed.      Plan:      EEG did not show any epileptiform activity   MRI Brain with and without contrast WNL  Hold on AED given the above is unrevealing and patient is not exhibiting signs of epileptiform activity and more neurocognitive decline    Plan for follow-up with NEV for incidental 
hypotension  Case discussed with the patient's nurse     Neurology notes reviewed patient is going to have MRI brain    Electronically signed by Bo Marie MD on 4/17/2024 at 6:23 PM  
lateral meniscal repair     Encounter for cholecystectomy     Bronchitis     RAD (reactive airway disease), mild intermittent, uncomplicated     TIA (transient ischemic attack)      Plan of Treatment:   Medications reviewed  1: Hypertension continue current dose of amlodipine 5 mg, add carvedilol 6.25 mg twice daily  2: Nonobstructive coronary artery disease continue current dose of aspirin 81 mg, Lipitor 20 mg, Imdur 60 mg  3: Continue current dose of aspirin 81 mg,  Check blood pressure for postural hypotension  BMP and BNP stat  If BNP significantly elevated patient going to need low-dose diuretic  Case discussed with the patient's nurse    Electronically signed by Bo Marie MD on 4/18/2024 at 11:52 AM

## 2024-04-18 NOTE — PLAN OF CARE
Problem: Chronic Conditions and Co-morbidities  Goal: Patient's chronic conditions and co-morbidity symptoms are monitored and maintained or improved  4/18/2024 0024 by Ashanti Eckert RN  Outcome: Progressing

## 2024-04-19 ENCOUNTER — CARE COORDINATION (OUTPATIENT)
Dept: CASE MANAGEMENT | Age: 84
End: 2024-04-19

## 2024-04-19 DIAGNOSIS — G45.9 TIA (TRANSIENT ISCHEMIC ATTACK): Primary | ICD-10-CM

## 2024-04-19 PROCEDURE — 1111F DSCHRG MED/CURRENT MED MERGE: CPT | Performed by: FAMILY MEDICINE

## 2024-04-19 NOTE — CARE COORDINATION
PCP  Specialist  When to call 911  [employer]. The patient agrees to contact the PCP office for questions related to their healthcare.       Medication reconciliation was performed with patient, who verbalizes understanding of administration of home medications. Medications reviewed, 1111F entered: yes    Was patient discharged with a pulse oximeter? no    Non-face-to-face services provided:  Obtained and reviewed discharge summary and/or continuity of care documents  Assessment and support for treatment adherence and medication management-1111f    Offered patient enrollment in the Remote Patient Monitoring (RPM) program for in-home monitoring: Patient is not eligible for RPM program.    Care Transitions 24 Hour Call    Do you have a copy of your discharge instructions?: Yes  Do you have all of your prescriptions and are they filled?: Yes  Have you been contacted by a [facility] Pharmacist?: No  Have you scheduled your follow up appointment?: Yes  How are you going to get to your appointment?: Car - family or friend to transport  Do you feel like you have everything you need to keep you well at home?: Yes  Care Transitions Interventions         Discussed follow-up appointments. If no appointment was previously scheduled, appointment scheduling offered: Yes.   Is follow up appointment scheduled within 7 days of discharge? Yes.    Follow Up  Future Appointments   Date Time Provider Department Center   4/22/2024 12:15 PM Sergei Montelongo MD [facility] None   5/20/2024 11:00 AM Sergei Montelongo MD [facility] None       Care Transition Nurse provided contact information.  Plan for follow-up call in 5-7 days based on severity of symptoms and risk factors.  Plan for next call: symptom management-review PCP appt, changes?     Kimberly Grayson RN

## 2024-04-22 ENCOUNTER — HOSPITAL ENCOUNTER (OUTPATIENT)
Age: 84
Discharge: HOME | End: 2024-04-22
Payer: MEDICARE

## 2024-04-22 DIAGNOSIS — R40.4 ALTERED SENSORIUM: ICD-10-CM

## 2024-04-22 LAB
ALBUMIN SERPL-MCNC: 3.9 G/DL (ref 3.5–5.2)
ALP SERPL-CCNC: 91 U/L (ref 35–104)
ALT SERPL-CCNC: 12 U/L (ref 5–33)
AMMONIA PLAS-SCNC: 21 UMOL/L (ref 11–51)
ANION GAP SERPL CALCULATED.3IONS-SCNC: 10 MMOL/L (ref 9–17)
AST SERPL-CCNC: 37 U/L
BILIRUB SERPL-MCNC: 0.2 MG/DL (ref 0.3–1.2)
BUN SERPL-MCNC: 15 MG/DL (ref 8–23)
CALCIUM SERPL-MCNC: 9.4 MG/DL (ref 8.6–10.4)
CHLORIDE SERPL-SCNC: 99 MMOL/L (ref 98–107)
CO2 SERPL-SCNC: 28 MMOL/L (ref 20–31)
CREAT SERPL-MCNC: 0.7 MG/DL (ref 0.5–0.9)
GFR SERPL CREATININE-BSD FRML MDRD: 86 ML/MIN/1.73M2
GLUCOSE SERPL-MCNC: 104 MG/DL (ref 70–99)
POTASSIUM SERPL-SCNC: 4.6 MMOL/L (ref 3.7–5.3)
PROT SERPL-MCNC: 7.8 G/DL (ref 6.4–8.3)
SODIUM SERPL-SCNC: 137 MMOL/L (ref 135–144)

## 2024-04-22 PROCEDURE — 82140 ASSAY OF AMMONIA: CPT

## 2024-04-22 PROCEDURE — 36415 COLL VENOUS BLD VENIPUNCTURE: CPT

## 2024-04-22 PROCEDURE — 80053 COMPREHEN METABOLIC PANEL: CPT

## 2024-04-23 ENCOUNTER — CARE COORDINATION (OUTPATIENT)
Dept: CASE MANAGEMENT | Age: 84
End: 2024-04-23

## 2024-04-23 NOTE — CARE COORDINATION
Care Transitions Follow Up Call    Patient Current Location:  Home: [address]  [place] [place] [facility] [address]    Care Transition Nurse contacted the family by telephone to follow up after admission on 4/15/24.  Verified name and  with family as identifiers.    Patient: Tracey Alarcon  Patient : 1940   MRN: [id number]  Reason for Admission: TIA  Discharge Date: 24 RARS: Readmission Risk Score: 9      Needs to be reviewed by the provider   Additional needs identified to be addressed with provider: No  none             Method of communication with provider: none.    Spoke to pt's daughter Dodie for transitions follow up call. Stated pt is doing OK, her son will take her to Neuro appt tomorrow. No changes noted at PCP follow up yesterday. Reviewed labs, ammonia wnl, CMP wnl. Encouraged call back with questions or concerns.     Addressed changes since last contact:  labs-reviewed  labs, wnl  Discussed follow-up appointments.    Follow Up  Future Appointments   Date Time Provider Department Center   2024  3:30 PM Joan Elizondo MD Neuro [facility] Neurology -   2024  2:45 PM Sergei Montelongo MD [facility] None   2024 11:00 AM Sergei Montelongo MD [facility] None       Care Transition Nurse reviewed discharge instructions with family and discussed any barriers to care and/or understanding of plan of care after discharge. Discussed appropriate site of care based on symptoms and resources available to patient including: PCP  Specialist  When to call Artoo[employer]  [employer]. The family agrees to contact the PCP office for questions related to their healthcare.     Advance Care Planning:   reviewed and current.        Care Transitions Subsequent and Final Call    Subsequent and Final Calls  Do you have any ongoing symptoms?: No  Have your medications changed?: No  Do you have any questions related to your medications?: No  Do you currently have any active services?: No  Do

## 2024-04-24 ENCOUNTER — OFFICE VISIT (OUTPATIENT)
Dept: NEUROLOGY | Age: 84
End: 2024-04-24
Payer: MEDICARE

## 2024-04-24 VITALS
HEART RATE: 66 BPM | BODY MASS INDEX: 42.78 KG/M2 | DIASTOLIC BLOOD PRESSURE: 80 MMHG | WEIGHT: 226.6 LBS | SYSTOLIC BLOOD PRESSURE: 144 MMHG | HEIGHT: 61 IN

## 2024-04-24 DIAGNOSIS — R41.3 DECLINE IN VERBAL MEMORY: ICD-10-CM

## 2024-04-24 DIAGNOSIS — N39.0 URINARY TRACT INFECTION WITHOUT HEMATURIA, SITE UNSPECIFIED: Primary | ICD-10-CM

## 2024-04-24 PROCEDURE — 1090F PRES/ABSN URINE INCON ASSESS: CPT | Performed by: STUDENT IN AN ORGANIZED HEALTH CARE EDUCATION/TRAINING PROGRAM

## 2024-04-24 PROCEDURE — G8417 CALC BMI ABV UP PARAM F/U: HCPCS | Performed by: STUDENT IN AN ORGANIZED HEALTH CARE EDUCATION/TRAINING PROGRAM

## 2024-04-24 PROCEDURE — 3079F DIAST BP 80-89 MM HG: CPT | Performed by: STUDENT IN AN ORGANIZED HEALTH CARE EDUCATION/TRAINING PROGRAM

## 2024-04-24 PROCEDURE — 1123F ACP DISCUSS/DSCN MKR DOCD: CPT | Performed by: STUDENT IN AN ORGANIZED HEALTH CARE EDUCATION/TRAINING PROGRAM

## 2024-04-24 PROCEDURE — G8427 DOCREV CUR MEDS BY ELIG CLIN: HCPCS | Performed by: STUDENT IN AN ORGANIZED HEALTH CARE EDUCATION/TRAINING PROGRAM

## 2024-04-24 PROCEDURE — 99204 OFFICE O/P NEW MOD 45 MIN: CPT | Performed by: STUDENT IN AN ORGANIZED HEALTH CARE EDUCATION/TRAINING PROGRAM

## 2024-04-24 PROCEDURE — G8399 PT W/DXA RESULTS DOCUMENT: HCPCS | Performed by: STUDENT IN AN ORGANIZED HEALTH CARE EDUCATION/TRAINING PROGRAM

## 2024-04-24 PROCEDURE — 1036F TOBACCO NON-USER: CPT | Performed by: STUDENT IN AN ORGANIZED HEALTH CARE EDUCATION/TRAINING PROGRAM

## 2024-04-24 PROCEDURE — 1111F DSCHRG MED/CURRENT MED MERGE: CPT | Performed by: STUDENT IN AN ORGANIZED HEALTH CARE EDUCATION/TRAINING PROGRAM

## 2024-04-24 PROCEDURE — 3077F SYST BP >= 140 MM HG: CPT | Performed by: STUDENT IN AN ORGANIZED HEALTH CARE EDUCATION/TRAINING PROGRAM

## 2024-04-24 RX ORDER — CEPHALEXIN 250 MG/1
250 CAPSULE ORAL 4 TIMES DAILY
Qty: 28 CAPSULE | Refills: 0 | Status: SHIPPED | OUTPATIENT
Start: 2024-04-24 | End: 2024-05-01

## 2024-04-24 NOTE — PROGRESS NOTES
[address]  [place] [address]  Dept: [phone]  Dept Fax: [phone]     NEUROLOGY CLINIC NOTE       PATIENT NAME: Tracey Alarcon  PATIENT MRN: [id number]  PRIMARY CARE PHYSICIAN: Sergei Montelongo MD    Today's clinic visit updates:   04/24/24 look below     Initial clinic encounter note:   Tracey Alarcon is a 83 y.o. female with hx of CAD, HLD, HTN following in the neurology clinic for staring off episode and confusion with trouble speaking and maybe a right facial droop noted by her daughter in 4/18/2024 following an URTI a week after. She was seen by our neurology service back then. She had MRI brain with no acute pathologies, she had EEG which was negative for seizures. She was found to have 3x5 mm cavernous aneursym incidentally for which she is following with NEV. No AEDs were started. The family were present in the office today and expressed concerns for dementia. Going over the hx, the patient seems to have some cognitive impairment with recent memories over the past few months. However, it seems that over the past 2 weeks when the patient started to act confused with stuttering and was having difficulty in navigating her way around and the family reported that the patient would have hard time processing how to walk through obstacles such as the walls of the rooms. No falling down, no tremors. The patient doesn't drink a lot of fluids to avoid frequent trips to the restroom. Going over the labs, it seems the patient did have a positive UTI around the timeline she started to develop those symptoms. viewing the MRI of the brain, it seems that the patient does have concerns for vascular dementia.part of the MMSE was obtained during this visit, she has good recall of the 3 objects. Was unable to perform serial 7s or spell the word \"WORLD\" backwards. She as not oriented to place or to time. The patient uses a walker/ cane

## 2024-04-26 ENCOUNTER — HOSPITAL ENCOUNTER (OUTPATIENT)
Age: 84
Discharge: HOME | End: 2024-04-26
Payer: MEDICARE

## 2024-04-26 LAB
25(OH)D3 SERPL-MCNC: 75 NG/ML (ref 30–100)
EST. AVERAGE GLUCOSE BLD GHB EST-MCNC: 131 MG/DL
FOLATE SERPL-MCNC: >20 NG/ML (ref 4.8–24.2)
HBA1C MFR BLD: 6.2 % (ref 4–6)
VIT B12 SERPL-MCNC: 685 PG/ML (ref 232–1245)

## 2024-04-26 PROCEDURE — 82746 ASSAY OF FOLIC ACID SERUM: CPT

## 2024-04-26 PROCEDURE — 82306 VITAMIN D 25 HYDROXY: CPT

## 2024-04-26 PROCEDURE — 36415 COLL VENOUS BLD VENIPUNCTURE: CPT

## 2024-04-26 PROCEDURE — 83036 HEMOGLOBIN GLYCOSYLATED A1C: CPT

## 2024-04-26 PROCEDURE — 82607 VITAMIN B-12: CPT

## 2024-04-30 ENCOUNTER — CARE COORDINATION (OUTPATIENT)
Dept: CASE MANAGEMENT | Age: 84
End: 2024-04-30

## 2024-04-30 NOTE — CARE COORDINATION
Care Transitions Outreach Attempt #1    Call within 2 business days of discharge: Yes   Attempt #1 to reach patient for transitions of care follow up. Unable to reach patient. Left message requesting call back.    Patient: Tracey Alarcon Patient : 1940 MRN: [id number]    Last Discharge Facility       Date Complaint Diagnosis Description Type Department Provider    4/15/24 Facial Droop; Fatigue; Altered Mental Status TIA (transient ischemic attack) ED to Hosp-Admission (Discharged) (ADMITTED) [facility] [place] [place] Sergei Montelongo MD; Mart...              Was this an external facility discharge? No Discharge Facility Name: [facility]    Noted following upcoming appointments from discharge chart review:   [facility] follow up appointment(s):   Future Appointments   Date Time Provider Department Center   2024  2:45 PM Sergei Montelongo MD [facility] None   2024 11:00 AM Sergei Montelongo MD [facility] None   2024 12:30 PM Holland Wang MD Neuro [facility] Neurology -     Plan for next call: symptom management-review Neuro appt from . Changes? Review labs. A1c increased to 6.2         Kimberly Grayson RN BSN [facility]  Care Transitions Nurse  [phone]   [email or web address]

## 2024-05-03 ENCOUNTER — CARE COORDINATION (OUTPATIENT)
Dept: CASE MANAGEMENT | Age: 84
End: 2024-05-03

## 2024-05-03 NOTE — CARE COORDINATION
Care Transitions Outreach Attempt #2    Attempted to reach patient for transitions of care follow up. Unable to reach patient. HIPAA compliant message left on  requesting a return call. Will end care transitions if no return call received.     Patient: Tracey Alarcon Patient : 1940 MRN: [id number]    Last Discharge Facility       Date Complaint Diagnosis Description Type Department Provider    4/15/24 Facial Droop; Fatigue; Altered Mental Status TIA (transient ischemic attack) ED to Hosp-Admission (Discharged) (ADMITTED) Sergei Skelton MD; Martus...              Was this an external facility discharge? No Discharge Facility: [facility]    Noted following upcoming appointments from discharge chart review:   [facility] follow up appointment(s):   Future Appointments   Date Time Provider Department Center   2024 12:30 PM Holland Wang MD Neuro [facility] Neurology -   2024 11:30 AM Sergei Montelongo MD [facility] None Catrina Weickert LPN  Care Transition Nurse

## 2024-06-18 ENCOUNTER — OFFICE VISIT (OUTPATIENT)
Dept: NEUROLOGY | Age: 84
End: 2024-06-18
Payer: MEDICARE

## 2024-06-18 VITALS
WEIGHT: 224.9 LBS | HEIGHT: 61 IN | SYSTOLIC BLOOD PRESSURE: 125 MMHG | DIASTOLIC BLOOD PRESSURE: 71 MMHG | BODY MASS INDEX: 42.46 KG/M2 | HEART RATE: 81 BPM

## 2024-06-18 DIAGNOSIS — G93.41 METABOLIC ENCEPHALOPATHY: Primary | ICD-10-CM

## 2024-06-18 DIAGNOSIS — N39.0 URINARY TRACT INFECTION WITHOUT HEMATURIA, SITE UNSPECIFIED: ICD-10-CM

## 2024-06-18 PROCEDURE — G8399 PT W/DXA RESULTS DOCUMENT: HCPCS | Performed by: STUDENT IN AN ORGANIZED HEALTH CARE EDUCATION/TRAINING PROGRAM

## 2024-06-18 PROCEDURE — G8417 CALC BMI ABV UP PARAM F/U: HCPCS | Performed by: STUDENT IN AN ORGANIZED HEALTH CARE EDUCATION/TRAINING PROGRAM

## 2024-06-18 PROCEDURE — G8427 DOCREV CUR MEDS BY ELIG CLIN: HCPCS | Performed by: STUDENT IN AN ORGANIZED HEALTH CARE EDUCATION/TRAINING PROGRAM

## 2024-06-18 PROCEDURE — 1090F PRES/ABSN URINE INCON ASSESS: CPT | Performed by: STUDENT IN AN ORGANIZED HEALTH CARE EDUCATION/TRAINING PROGRAM

## 2024-06-18 PROCEDURE — 99214 OFFICE O/P EST MOD 30 MIN: CPT | Performed by: STUDENT IN AN ORGANIZED HEALTH CARE EDUCATION/TRAINING PROGRAM

## 2024-06-18 PROCEDURE — 1036F TOBACCO NON-USER: CPT | Performed by: STUDENT IN AN ORGANIZED HEALTH CARE EDUCATION/TRAINING PROGRAM

## 2024-06-18 PROCEDURE — 1123F ACP DISCUSS/DSCN MKR DOCD: CPT | Performed by: STUDENT IN AN ORGANIZED HEALTH CARE EDUCATION/TRAINING PROGRAM

## 2024-06-18 PROCEDURE — 3074F SYST BP LT 130 MM HG: CPT | Performed by: STUDENT IN AN ORGANIZED HEALTH CARE EDUCATION/TRAINING PROGRAM

## 2024-06-18 PROCEDURE — 3078F DIAST BP <80 MM HG: CPT | Performed by: STUDENT IN AN ORGANIZED HEALTH CARE EDUCATION/TRAINING PROGRAM

## 2024-06-18 NOTE — PROGRESS NOTES
Year: 1     Unstable Housing in the Last Year: No        Current Outpatient Medications   Medication Sig Dispense Refill    isosorbide mononitrate (IMDUR) 60 MG extended release tablet TAKE 1 TABLET BY MOUTH EVERY DAY 90 tablet 0    simvastatin (ZOCOR) 40 MG tablet TAKE 1 TABLET BY MOUTH EVERY DAY 90 tablet 0    carvedilol (COREG) 6.25 MG tablet Take 1 tablet by mouth 2 times daily 60 tablet 3    amLODIPine (NORVASC) 5 MG tablet Take 1 tablet by mouth nightly 90 tablet 0    aspirin 81 MG EC tablet Take 1 tablet by mouth daily 30 tablet 5    vitamin D (CHOLECALCIFEROL) 25 MCG (1000 UT) TABS tablet Take 1 tablet by mouth daily      nitroGLYCERIN (NITROSTAT) 0.4 MG SL tablet Place 1 tablet under the tongue every 5 minutes as needed for Chest pain up to max of 3 total doses. If no relief after 1 dose, call 911.      esomeprazole (NEXIUM 24HR) 20 MG delayed release capsule Take 1 capsule by mouth every morning (before breakfast)      [employer] PURELAX 17 GM/SCOOP powder Take 17 g by mouth daily as needed (Patient not taking: Reported on 5/1/2024)      fexofenadine (ALLEGRA) 180 MG tablet TAKE 1 TABLET BY MOUTH EVERY DAY 30 tablet 2    Multiple Vitamins-Minerals (HAIR SKIN AND NAILS FORMULA PO) Take 2 tablets by mouth daily (Patient not taking: Reported on 4/24/2024)      b complex vitamins capsule Take 1 capsule by mouth daily       No current facility-administered medications for this visit.        Allergies   Allergen Reactions    Medrol [Methylprednisolone] Other (See Comments)     Unknown reaction, \"I don't remember. It just made me feel funny.\"        REVIEW OF SYSTEMS:     Review of Systems   Constitutional:  Positive for fatigue. Negative for chills and diaphoresis.   Neurological:  Negative for dizziness, seizures, facial asymmetry, speech difficulty, weakness, light-headedness and headaches.   Psychiatric/Behavioral:  Negative for behavioral problems, confusion, decreased concentration and hallucinations.     
Negative Babinski     Gait                  Patient uses wheelchair at baseline.          PRIOR TESTS AND IMAGING: Following images and Labs were reviewed by the examiner       MRI Brain w / wo contrast: Reviewed      Routine EEG: WNL      Neuropsychological testing:     TSH 4.37   Vit B12 685   Folate >20           ASSESSMENT / PLAN:     Cognitive impairment- sig improved after treatment of underlying UTI.  No more evidence of changes in personality or memory issues other than baseline.  Patient able to do all ADL.      PLAN:   MRI Brain with and without contrast completed  EEG completed  Vit B12, folate, TSH, reviewed  Follow-up with Dr. Braden outpatient for underlying aneurysm.  Follow up in the clinic after above work up   Can go back to PT  Instructed patient to call the clinic if symptoms worsen or develop any new symptoms.         Electronically signed by CEDRIC Wang MD on 6/18/2024 at 3:49 PM

## 2024-07-01 ENCOUNTER — TELEPHONE (OUTPATIENT)
Dept: NEUROLOGY | Age: 84
End: 2024-07-01

## 2024-07-01 NOTE — TELEPHONE ENCOUNTER
Per Dr. Wang she needs to follow up with Dr. Braden for an aneurysm. Discussed with Dr. Braden and he states \"She should have been booked from when she saw me in April 2024. Just the usual double book when I'm not on telestroke next month\" Received telestroke schedule and can get pt scheduled for a double book between July 15-19 and July 29-31.    Called pt and LM for her with dates to get her double booked for an appt.

## 2024-09-16 SDOH — HEALTH STABILITY: PHYSICAL HEALTH: ON AVERAGE, HOW MANY MINUTES DO YOU ENGAGE IN EXERCISE AT THIS LEVEL?: 90 MIN

## 2024-09-16 SDOH — HEALTH STABILITY: PHYSICAL HEALTH: ON AVERAGE, HOW MANY DAYS PER WEEK DO YOU ENGAGE IN MODERATE TO STRENUOUS EXERCISE (LIKE A BRISK WALK)?: 3 DAYS

## 2024-09-19 ENCOUNTER — OFFICE VISIT (OUTPATIENT)
Dept: INTERNAL MEDICINE CLINIC | Age: 84
End: 2024-09-19

## 2024-09-19 VITALS
WEIGHT: 222 LBS | HEART RATE: 73 BPM | BODY MASS INDEX: 41.91 KG/M2 | SYSTOLIC BLOOD PRESSURE: 110 MMHG | OXYGEN SATURATION: 97 % | HEIGHT: 61 IN | DIASTOLIC BLOOD PRESSURE: 68 MMHG

## 2024-09-19 DIAGNOSIS — Z76.89 ENCOUNTER TO ESTABLISH CARE: Primary | ICD-10-CM

## 2024-09-19 DIAGNOSIS — R73.03 PREDIABETES: ICD-10-CM

## 2024-09-19 DIAGNOSIS — Z86.73 HISTORY OF STROKE: ICD-10-CM

## 2024-09-19 DIAGNOSIS — E66.01 CLASS 3 SEVERE OBESITY DUE TO EXCESS CALORIES WITH SERIOUS COMORBIDITY AND BODY MASS INDEX (BMI) OF 40.0 TO 44.9 IN ADULT (HCC): ICD-10-CM

## 2024-09-19 DIAGNOSIS — G47.30 SLEEP APNEA, UNSPECIFIED TYPE: ICD-10-CM

## 2024-09-19 DIAGNOSIS — R06.81 APNEA: ICD-10-CM

## 2024-09-19 DIAGNOSIS — I10 PRIMARY HYPERTENSION: ICD-10-CM

## 2024-09-19 DIAGNOSIS — R05.2 SUBACUTE COUGH: ICD-10-CM

## 2024-09-19 DIAGNOSIS — E78.2 MIXED HYPERLIPIDEMIA: ICD-10-CM

## 2024-09-19 DIAGNOSIS — I67.1 LEFT CAVERNOUS CAROTID ANEURYSM: ICD-10-CM

## 2024-09-19 DIAGNOSIS — J40 BRONCHITIS: ICD-10-CM

## 2024-09-19 PROBLEM — R06.02 SOB (SHORTNESS OF BREATH): Status: RESOLVED | Noted: 2021-08-23 | Resolved: 2024-09-19

## 2024-09-19 PROBLEM — E66.813 CLASS 3 SEVERE OBESITY WITHOUT SERIOUS COMORBIDITY WITH BODY MASS INDEX (BMI) OF 45.0 TO 49.9 IN ADULT: Status: RESOLVED | Noted: 2021-08-08 | Resolved: 2024-09-19

## 2024-09-19 RX ORDER — BENZONATATE 100 MG
200 CAPSULE ORAL
COMMUNITY
Start: 2024-09-14 | End: 2024-09-19

## 2024-09-19 RX ORDER — AZITHROMYCIN 250 MG/1
TABLET, FILM COATED ORAL
COMMUNITY
Start: 2024-09-14

## 2024-09-19 ASSESSMENT — ENCOUNTER SYMPTOMS
ABDOMINAL PAIN: 0
VOMITING: 0
BACK PAIN: 0
WHEEZING: 0
STRIDOR: 0
CONSTIPATION: 0
DIARRHEA: 0
COUGH: 1
SHORTNESS OF BREATH: 0

## 2024-09-19 ASSESSMENT — PATIENT HEALTH QUESTIONNAIRE - PHQ9
SUM OF ALL RESPONSES TO PHQ QUESTIONS 1-9: 0
SUM OF ALL RESPONSES TO PHQ QUESTIONS 1-9: 0
SUM OF ALL RESPONSES TO PHQ9 QUESTIONS 1 & 2: 0
1. LITTLE INTEREST OR PLEASURE IN DOING THINGS: NOT AT ALL
SUM OF ALL RESPONSES TO PHQ QUESTIONS 1-9: 0
2. FEELING DOWN, DEPRESSED OR HOPELESS: NOT AT ALL
SUM OF ALL RESPONSES TO PHQ QUESTIONS 1-9: 0

## 2024-09-20 ENCOUNTER — HOSPITAL ENCOUNTER (OUTPATIENT)
Dept: SLEEP CENTER | Age: 84
Discharge: HOME | End: 2024-09-22
Payer: MEDICARE

## 2024-09-20 ENCOUNTER — HOSPITAL ENCOUNTER (OUTPATIENT)
Facility: CLINIC | Age: 84
Discharge: HOME | End: 2024-09-22
Payer: MEDICARE

## 2024-09-20 ENCOUNTER — HOSPITAL ENCOUNTER (OUTPATIENT)
Dept: GENERAL RADIOLOGY | Facility: CLINIC | Age: 84
Discharge: HOME | End: 2024-09-22
Payer: MEDICARE

## 2024-09-20 VITALS
BODY MASS INDEX: 41.91 KG/M2 | HEIGHT: 61 IN | HEART RATE: 70 BPM | OXYGEN SATURATION: 92 % | RESPIRATION RATE: 20 BRPM | WEIGHT: 222 LBS

## 2024-09-20 DIAGNOSIS — R06.81 APNEA: ICD-10-CM

## 2024-09-20 DIAGNOSIS — R05.2 SUBACUTE COUGH: ICD-10-CM

## 2024-09-20 DIAGNOSIS — G47.30 SLEEP APNEA, UNSPECIFIED TYPE: ICD-10-CM

## 2024-09-20 DIAGNOSIS — E66.01 CLASS 3 SEVERE OBESITY DUE TO EXCESS CALORIES WITH SERIOUS COMORBIDITY AND BODY MASS INDEX (BMI) OF 40.0 TO 44.9 IN ADULT: ICD-10-CM

## 2024-09-20 PROCEDURE — 95810 POLYSOM 6/> YRS 4/> PARAM: CPT

## 2024-09-20 PROCEDURE — 71046 X-RAY EXAM CHEST 2 VIEWS: CPT

## 2024-09-20 ASSESSMENT — SLEEP AND FATIGUE QUESTIONNAIRES
HOW LIKELY ARE YOU TO NOD OFF OR FALL ASLEEP WHILE SITTING AND TALKING TO SOMEONE: WOULD NEVER DOZE
HOW LIKELY ARE YOU TO NOD OFF OR FALL ASLEEP WHILE LYING DOWN TO REST IN THE AFTERNOON WHEN CIRCUMSTANCES PERMIT: HIGH CHANCE OF DOZING
HOW LIKELY ARE YOU TO NOD OFF OR FALL ASLEEP WHILE WATCHING TV: HIGH CHANCE OF DOZING
HOW LIKELY ARE YOU TO NOD OFF OR FALL ASLEEP IN A CAR, WHILE STOPPED FOR A FEW MINUTES IN TRAFFIC: WOULD NEVER DOZE
HOW LIKELY ARE YOU TO NOD OFF OR FALL ASLEEP WHEN YOU ARE A PASSENGER IN A CAR FOR AN HOUR WITHOUT A BREAK: SLIGHT CHANCE OF DOZING
HOW LIKELY ARE YOU TO NOD OFF OR FALL ASLEEP WHILE SITTING INACTIVE IN A PUBLIC PLACE: WOULD NEVER DOZE
HOW LIKELY ARE YOU TO NOD OFF OR FALL ASLEEP WHILE SITTING AND READING: MODERATE CHANCE OF DOZING
ESS TOTAL SCORE: 9
HOW LIKELY ARE YOU TO NOD OFF OR FALL ASLEEP WHILE SITTING QUIETLY AFTER LUNCH WITHOUT ALCOHOL: WOULD NEVER DOZE

## 2024-09-23 DIAGNOSIS — I25.10 CORONARY ARTERY DISEASE INVOLVING NATIVE CORONARY ARTERY OF NATIVE HEART WITHOUT ANGINA PECTORIS: ICD-10-CM

## 2024-09-23 DIAGNOSIS — I10 ESSENTIAL HYPERTENSION: Primary | ICD-10-CM

## 2024-09-23 DIAGNOSIS — E78.2 MIXED HYPERLIPIDEMIA: ICD-10-CM

## 2024-09-23 RX ORDER — SIMVASTATIN 40 MG
TABLET ORAL
Qty: 90 TABLET | Refills: 1 | Status: SHIPPED | OUTPATIENT
Start: 2024-09-23

## 2024-09-23 RX ORDER — AMLODIPINE BESYLATE 5 MG/1
5 TABLET ORAL NIGHTLY
Qty: 30 TABLET | Refills: 1 | Status: SHIPPED | OUTPATIENT
Start: 2024-09-23 | End: 2024-11-22

## 2024-09-23 RX ORDER — CARVEDILOL 6.25 MG/1
6.25 TABLET ORAL 2 TIMES DAILY
Qty: 60 TABLET | Refills: 1 | Status: SHIPPED | OUTPATIENT
Start: 2024-09-23 | End: 2024-11-22

## 2024-09-23 RX ORDER — ISOSORBIDE MONONITRATE 60 MG/1
TABLET, EXTENDED RELEASE ORAL
Qty: 90 TABLET | Refills: 0 | Status: SHIPPED | OUTPATIENT
Start: 2024-09-23

## 2024-10-06 LAB — STATUS: NORMAL

## 2024-10-16 ENCOUNTER — HOSPITAL ENCOUNTER (OUTPATIENT)
Dept: SLEEP CENTER | Age: 84
Discharge: HOME | End: 2024-10-18
Payer: MEDICARE

## 2024-10-16 DIAGNOSIS — E66.813 CLASS 3 SEVERE OBESITY DUE TO EXCESS CALORIES WITH SERIOUS COMORBIDITY AND BODY MASS INDEX (BMI) OF 40.0 TO 44.9 IN ADULT: ICD-10-CM

## 2024-10-16 DIAGNOSIS — R06.81 APNEA: ICD-10-CM

## 2024-10-16 DIAGNOSIS — E66.01 CLASS 3 SEVERE OBESITY DUE TO EXCESS CALORIES WITH SERIOUS COMORBIDITY AND BODY MASS INDEX (BMI) OF 40.0 TO 44.9 IN ADULT: ICD-10-CM

## 2024-10-16 DIAGNOSIS — G47.30 SLEEP APNEA, UNSPECIFIED TYPE: ICD-10-CM

## 2024-10-16 PROCEDURE — 95810 POLYSOM 6/> YRS 4/> PARAM: CPT

## 2024-10-17 VITALS
WEIGHT: 222 LBS | HEART RATE: 70 BPM | RESPIRATION RATE: 20 BRPM | BODY MASS INDEX: 41.91 KG/M2 | OXYGEN SATURATION: 92 % | HEIGHT: 61 IN

## 2024-10-17 ASSESSMENT — SLEEP AND FATIGUE QUESTIONNAIRES
HOW LIKELY ARE YOU TO NOD OFF OR FALL ASLEEP WHILE SITTING QUIETLY AFTER LUNCH WITHOUT ALCOHOL: WOULD NEVER DOZE
ESS TOTAL SCORE: 9
HOW LIKELY ARE YOU TO NOD OFF OR FALL ASLEEP WHEN YOU ARE A PASSENGER IN A CAR FOR AN HOUR WITHOUT A BREAK: SLIGHT CHANCE OF DOZING
HOW LIKELY ARE YOU TO NOD OFF OR FALL ASLEEP WHILE SITTING AND TALKING TO SOMEONE: WOULD NEVER DOZE
HOW LIKELY ARE YOU TO NOD OFF OR FALL ASLEEP WHILE LYING DOWN TO REST IN THE AFTERNOON WHEN CIRCUMSTANCES PERMIT: HIGH CHANCE OF DOZING
HOW LIKELY ARE YOU TO NOD OFF OR FALL ASLEEP WHILE SITTING INACTIVE IN A PUBLIC PLACE: WOULD NEVER DOZE
HOW LIKELY ARE YOU TO NOD OFF OR FALL ASLEEP WHILE WATCHING TV: HIGH CHANCE OF DOZING
HOW LIKELY ARE YOU TO NOD OFF OR FALL ASLEEP WHILE SITTING AND READING: MODERATE CHANCE OF DOZING
HOW LIKELY ARE YOU TO NOD OFF OR FALL ASLEEP IN A CAR, WHILE STOPPED FOR A FEW MINUTES IN TRAFFIC: WOULD NEVER DOZE

## 2024-10-19 LAB — STATUS: NORMAL

## 2024-11-04 DIAGNOSIS — I10 ESSENTIAL HYPERTENSION: ICD-10-CM

## 2024-11-04 RX ORDER — AMLODIPINE BESYLATE 5 MG/1
5 TABLET ORAL NIGHTLY
Qty: 90 TABLET | Refills: 1 | Status: SHIPPED | OUTPATIENT
Start: 2024-11-04 | End: 2025-05-03

## 2024-11-19 ENCOUNTER — OFFICE VISIT (OUTPATIENT)
Dept: INTERNAL MEDICINE CLINIC | Age: 84
End: 2024-11-19

## 2024-11-19 VITALS
BODY MASS INDEX: 40.3 KG/M2 | HEART RATE: 77 BPM | HEIGHT: 62 IN | OXYGEN SATURATION: 96 % | DIASTOLIC BLOOD PRESSURE: 68 MMHG | SYSTOLIC BLOOD PRESSURE: 118 MMHG | WEIGHT: 219 LBS

## 2024-11-19 DIAGNOSIS — G47.33 OSA (OBSTRUCTIVE SLEEP APNEA): Primary | ICD-10-CM

## 2024-11-19 DIAGNOSIS — I67.1 LEFT CAVERNOUS CAROTID ANEURYSM: ICD-10-CM

## 2024-11-19 DIAGNOSIS — I25.10 CORONARY ARTERY DISEASE INVOLVING NATIVE CORONARY ARTERY OF NATIVE HEART WITHOUT ANGINA PECTORIS: ICD-10-CM

## 2024-11-19 DIAGNOSIS — R73.03 PREDIABETES: ICD-10-CM

## 2024-11-19 PROBLEM — G45.9 TIA (TRANSIENT ISCHEMIC ATTACK): Status: RESOLVED | Noted: 2024-04-15 | Resolved: 2024-11-19

## 2024-11-19 ASSESSMENT — ENCOUNTER SYMPTOMS
ALLERGIC/IMMUNOLOGIC NEGATIVE: 1
COUGH: 0
ABDOMINAL PAIN: 0
VOMITING: 0
BACK PAIN: 0
NAUSEA: 0
CONSTIPATION: 0
WHEEZING: 0
SHORTNESS OF BREATH: 0
DIARRHEA: 0

## 2024-11-19 NOTE — PROGRESS NOTES
[facility]  [address] [address]  Dept: [phone]  Dept Fax: [phone]    OFFICE VISIT NOTE  Date of patient's visit: 11/19/2024  Patient's Name:  Tracey Alarcon YOB: 1940            Patient Care Team:  Aura Rosado MD as PCP - General (Internal Medicine)  Aura Rosado MD as PCP - Empaneled Provider  Rajan Mckeon MD as Consulting Physician (Gastroenterology)  _________________________________________    ________________________________________________  Chief Complaint:   Sleep Apnea and Cough (Follow up )    _______________________________________________  History of Presenting Illness:  History was obtained from the patient. Tracey Alarcon is a 84 y.o. female.     Follow-up   BP and HR normal     LAMIN   Started using CPAP   Having trouble using it   Fitting issues as per the pt  Foes not follow up with pulmonology    Cough   Improving      Lab Results   Component Value Date    HGB 12.6 04/16/2024    LABA1C 6.2 (H) 04/26/2024    CHOL 136 04/17/2024    HDL 47 04/17/2024    LDL 73 04/17/2024    TRIG 82 04/17/2024    VITD25 75.0 04/26/2024    CREATININE 0.7 04/22/2024     _____________________________________________________  Past Medical/Surgical History:        Diagnosis Date    Arthritis     Borderline diabetic     controlled with diet    CAD (coronary artery disease)     Class 3 severe obesity without serious comorbidity with body mass index (BMI) of 45.0 to 49.9 in adult 08/08/2021    Constipation     GERD (gastroesophageal reflux disease)     Hyperlipidemia     Hypertension     Incisional hernia     Irregular heart beat     Noted 7-20-21    Mass of soft palate     Noted 7-20-21    Prolonged emergence from general anesthesia     Seasonal allergies     Short of breath on exertion     SOB (shortness of breath) 08/23/2021    TIA (transient ischemic attack) 04/15/2024    Wears glasses            Procedure Laterality Date

## 2024-11-27 ENCOUNTER — OFFICE VISIT (OUTPATIENT)
Dept: NEUROLOGY | Age: 84
End: 2024-11-27
Payer: MEDICARE

## 2024-11-27 VITALS
HEART RATE: 60 BPM | HEIGHT: 62 IN | DIASTOLIC BLOOD PRESSURE: 66 MMHG | SYSTOLIC BLOOD PRESSURE: 110 MMHG | WEIGHT: 219 LBS | BODY MASS INDEX: 40.3 KG/M2

## 2024-11-27 DIAGNOSIS — I10 PRIMARY HYPERTENSION: ICD-10-CM

## 2024-11-27 DIAGNOSIS — Z86.73 HISTORY OF STROKE: ICD-10-CM

## 2024-11-27 DIAGNOSIS — I67.1 ANEURYSM OF INTRACRANIAL PORTION OF LEFT INTERNAL CAROTID ARTERY: Primary | ICD-10-CM

## 2024-11-27 PROCEDURE — 1036F TOBACCO NON-USER: CPT | Performed by: PSYCHIATRY & NEUROLOGY

## 2024-11-27 PROCEDURE — 3074F SYST BP LT 130 MM HG: CPT | Performed by: PSYCHIATRY & NEUROLOGY

## 2024-11-27 PROCEDURE — G8484 FLU IMMUNIZE NO ADMIN: HCPCS | Performed by: PSYCHIATRY & NEUROLOGY

## 2024-11-27 PROCEDURE — G8399 PT W/DXA RESULTS DOCUMENT: HCPCS | Performed by: PSYCHIATRY & NEUROLOGY

## 2024-11-27 PROCEDURE — G8417 CALC BMI ABV UP PARAM F/U: HCPCS | Performed by: PSYCHIATRY & NEUROLOGY

## 2024-11-27 PROCEDURE — 3078F DIAST BP <80 MM HG: CPT | Performed by: PSYCHIATRY & NEUROLOGY

## 2024-11-27 PROCEDURE — 99205 OFFICE O/P NEW HI 60 MIN: CPT | Performed by: PSYCHIATRY & NEUROLOGY

## 2024-11-27 PROCEDURE — 1090F PRES/ABSN URINE INCON ASSESS: CPT | Performed by: PSYCHIATRY & NEUROLOGY

## 2024-11-27 PROCEDURE — G8427 DOCREV CUR MEDS BY ELIG CLIN: HCPCS | Performed by: PSYCHIATRY & NEUROLOGY

## 2024-11-27 PROCEDURE — 1159F MED LIST DOCD IN RCRD: CPT | Performed by: PSYCHIATRY & NEUROLOGY

## 2024-11-27 PROCEDURE — 1123F ACP DISCUSS/DSCN MKR DOCD: CPT | Performed by: PSYCHIATRY & NEUROLOGY

## 2024-11-27 ASSESSMENT — ENCOUNTER SYMPTOMS
PHOTOPHOBIA: 0
SHORTNESS OF BREATH: 0
COLOR CHANGE: 0
NAUSEA: 0
VOICE CHANGE: 0
COUGH: 0
VOMITING: 0

## 2024-11-27 NOTE — PROGRESS NOTES
mononitrate (IMDUR) 60 MG extended release tablet TAKE 1 TABLET BY MOUTH EVERY DAY 90 tablet 0    simvastatin (ZOCOR) 40 MG tablet TAKE 1 TABLET BY MOUTH EVERY DAY 90 tablet 1    aspirin 81 MG EC tablet Take 1 tablet by mouth daily 30 tablet 5    vitamin D (CHOLECALCIFEROL) 25 MCG (1000 UT) TABS tablet Take 1 tablet by mouth daily      nitroGLYCERIN (NITROSTAT) 0.4 MG SL tablet Place 1 tablet under the tongue every 5 minutes as needed for Chest pain up to max of 3 total doses. If no relief after 1 dose, call 911. (Patient not taking: Reported on 11/19/2024)      esomeprazole (NEXIUM 24HR) 20 MG delayed release capsule Take 1 capsule by mouth every morning (before breakfast)      [employer] PURELAX 17 GM/SCOOP powder Take 17 g by mouth daily as needed (Patient not taking: Reported on 9/19/2024)      fexofenadine (ALLEGRA) 180 MG tablet TAKE 1 TABLET BY MOUTH EVERY DAY (Patient not taking: Reported on 11/19/2024) 30 tablet 2    Multiple Vitamins-Minerals (HAIR SKIN AND NAILS FORMULA PO) Take 2 tablets by mouth daily      b complex vitamins capsule Take 1 capsule by mouth daily       No current facility-administered medications for this visit.     Past Medical History:   Diagnosis Date    Arthritis     Borderline diabetic     controlled with diet    CAD (coronary artery disease)     Class 3 severe obesity without serious comorbidity with body mass index (BMI) of 45.0 to 49.9 in adult 08/08/2021    Constipation     GERD (gastroesophageal reflux disease)     Hyperlipidemia     Hypertension     Incisional hernia     Irregular heart beat     Noted 7-20-21    Mass of soft palate     Noted 7-20-21    Prolonged emergence from general anesthesia     Seasonal allergies     Short of breath on exertion     SOB (shortness of breath) 08/23/2021    TIA (transient ischemic attack) 04/15/2024    Wears glasses       Past Surgical History:   Procedure Laterality Date    CARPAL TUNNEL RELEASE Bilateral     2 times on the right and 2 times on

## 2024-11-29 ENCOUNTER — TELEPHONE (OUTPATIENT)
Dept: NEUROLOGY | Age: 84
End: 2024-11-29

## 2024-11-29 DIAGNOSIS — I10 ESSENTIAL HYPERTENSION: ICD-10-CM

## 2024-11-29 DIAGNOSIS — I25.10 CORONARY ARTERY DISEASE INVOLVING NATIVE CORONARY ARTERY OF NATIVE HEART WITHOUT ANGINA PECTORIS: ICD-10-CM

## 2024-11-29 RX ORDER — CARVEDILOL 6.25 MG/1
6.25 TABLET ORAL 2 TIMES DAILY
Qty: 180 TABLET | Refills: 1 | Status: SHIPPED | OUTPATIENT
Start: 2024-11-29 | End: 2025-05-28

## 2024-11-29 NOTE — TELEPHONE ENCOUNTER
Patient son Fei Alarcon states that Tracey will think about this and call us to schedule if she decides to proceed. Son's number [phone]. Daughter is SAVANNAH, her name is Dodie Powell, [phone].

## 2024-12-06 ENCOUNTER — PATIENT MESSAGE (OUTPATIENT)
Dept: NEUROLOGY | Age: 84
End: 2024-12-06

## 2024-12-11 NOTE — TELEPHONE ENCOUNTER
I called patient to schedule surgery and she said that we have to schedule with her POA, Dodie. I called Dodie, got voicemail, left message to contact me to schedule.   She also wonders if you want a clearance from her PCP Dr Rosado.   In speaking with her she mentioned that she has had a light headache for he last 5 days and is attributing it to sinus congestion. I did recommend she contact her PCP about the headaches/sinus.   Please advise.

## 2024-12-16 NOTE — TELEPHONE ENCOUNTER
We could not schedule patient for Monday due to no anesthesia. I have patient scheduled for Tuesday, January 7th at 9:00 am . Arrival time is going to be at 7:00 am. I called patient to confirm. She asked where to go and I gave her instruction for that. She vocalized understanding. I will mail her instruction sheet with detailed information.

## 2024-12-17 ENCOUNTER — TELEPHONE (OUTPATIENT)
Dept: NEUROLOGY | Age: 84
End: 2024-12-17

## 2024-12-17 ENCOUNTER — OFFICE VISIT (OUTPATIENT)
Dept: NEUROLOGY | Age: 84
End: 2024-12-17
Payer: MEDICARE

## 2024-12-17 VITALS — SYSTOLIC BLOOD PRESSURE: 141 MMHG | DIASTOLIC BLOOD PRESSURE: 78 MMHG | HEART RATE: 66 BPM

## 2024-12-17 DIAGNOSIS — I67.1 ANEURYSM OF INTRACRANIAL PORTION OF LEFT INTERNAL CAROTID ARTERY: Primary | ICD-10-CM

## 2024-12-17 DIAGNOSIS — R41.3 MEMORY CHANGES: ICD-10-CM

## 2024-12-17 PROCEDURE — 3077F SYST BP >= 140 MM HG: CPT | Performed by: STUDENT IN AN ORGANIZED HEALTH CARE EDUCATION/TRAINING PROGRAM

## 2024-12-17 PROCEDURE — G8417 CALC BMI ABV UP PARAM F/U: HCPCS | Performed by: STUDENT IN AN ORGANIZED HEALTH CARE EDUCATION/TRAINING PROGRAM

## 2024-12-17 PROCEDURE — G8399 PT W/DXA RESULTS DOCUMENT: HCPCS | Performed by: STUDENT IN AN ORGANIZED HEALTH CARE EDUCATION/TRAINING PROGRAM

## 2024-12-17 PROCEDURE — 3078F DIAST BP <80 MM HG: CPT | Performed by: STUDENT IN AN ORGANIZED HEALTH CARE EDUCATION/TRAINING PROGRAM

## 2024-12-17 PROCEDURE — 1159F MED LIST DOCD IN RCRD: CPT | Performed by: STUDENT IN AN ORGANIZED HEALTH CARE EDUCATION/TRAINING PROGRAM

## 2024-12-17 PROCEDURE — G8484 FLU IMMUNIZE NO ADMIN: HCPCS | Performed by: STUDENT IN AN ORGANIZED HEALTH CARE EDUCATION/TRAINING PROGRAM

## 2024-12-17 PROCEDURE — 99214 OFFICE O/P EST MOD 30 MIN: CPT | Performed by: STUDENT IN AN ORGANIZED HEALTH CARE EDUCATION/TRAINING PROGRAM

## 2024-12-17 PROCEDURE — 1036F TOBACCO NON-USER: CPT | Performed by: STUDENT IN AN ORGANIZED HEALTH CARE EDUCATION/TRAINING PROGRAM

## 2024-12-17 PROCEDURE — 1123F ACP DISCUSS/DSCN MKR DOCD: CPT | Performed by: STUDENT IN AN ORGANIZED HEALTH CARE EDUCATION/TRAINING PROGRAM

## 2024-12-17 PROCEDURE — 1090F PRES/ABSN URINE INCON ASSESS: CPT | Performed by: STUDENT IN AN ORGANIZED HEALTH CARE EDUCATION/TRAINING PROGRAM

## 2024-12-17 PROCEDURE — G8427 DOCREV CUR MEDS BY ELIG CLIN: HCPCS | Performed by: STUDENT IN AN ORGANIZED HEALTH CARE EDUCATION/TRAINING PROGRAM

## 2024-12-17 RX ORDER — MEMANTINE HYDROCHLORIDE 5 MG/1
5 TABLET ORAL 2 TIMES DAILY
Qty: 180 TABLET | Refills: 1 | Status: CANCELLED | OUTPATIENT
Start: 2024-12-17

## 2024-12-17 NOTE — TELEPHONE ENCOUNTER
Called patient to confirm procedure date/time.Patient scheduled for 01/06/2025 at 11:00 am, arrival time is 9:00 am.Nothing after midnight to eat or drink. Heart and blood pressure meds to be taken with small sips of water.   Per your note she is taking a baby aspirin. Per the order, Plavix 75 mg and Aspirin 325 mg  7 days prior.   She was unaware of any prescriptions. Will she be sent rx for Plavix?  Should she up her aspirin to 325 mg or stay with the 81 mg?

## 2024-12-17 NOTE — PROGRESS NOTES
[facility] NEUROLOGY    PATIENT NAME: Tracey Alarcon  PATIENT MRN: [id number]  PRIMARY CARE PHYSICIAN: Aura Rosado MD    HPI:      History was obtained from the patient and collateral history was obtained from the accompanying family and medical records.        12/17/2024: Doing well, \"was stuck in a subject for 3 minutes\" patient is aware of this episode, does not seem to be a seizure like activity. She has been having declining memory function as per daughter, Her MOCA score was 19 +1 (12 or less years of education). Discussed starting Memantine with explaining that it would only possibly slow progression of memory dysfunction but would not improve it. Patient decided to wait until aneurysmal treatment.     6/12/2024: Reportedly that patient has sig improved since she completed the antibiotic during last visit as for UTI. She also underwent B12, Folate, Vit D levels which were all WNL. There has been no sig episodes of confusion since then. Patient seems to be back to her baseline.         Visit 4/30/2024   Tracey Alarcon is a 83 y.o. female with hx of CAD, HLD, HTN following in the neurology clinic for staring off episode and confusion with trouble speaking and maybe a right facial droop noted by her daughter in 4/18/2024 following an URTI a week after. She was seen by our neurology service back then. She had MRI brain with no acute pathologies, she had EEG which was negative for seizures. She was found to have 3x5 mm cavernous aneursym incidentally for which she is following with [facility]. No AEDs were started. The family were present in the office today and expressed concerns for dementia. Going over the hx, the patient seems to have some cognitive impairment with recent memories over the past few months. However, it seems that over the past 2 weeks when the patient started to act confused with stuttering and was having difficulty in navigating her way

## 2024-12-18 RX ORDER — CLOPIDOGREL BISULFATE 75 MG/1
75 TABLET ORAL DAILY
Qty: 90 TABLET | Refills: 1 | Status: ON HOLD | OUTPATIENT
Start: 2024-12-31 | End: 2025-01-07

## 2025-01-06 ENCOUNTER — HOSPITAL ENCOUNTER (INPATIENT)
Dept: INTERVENTIONAL RADIOLOGY/VASCULAR | Age: 85
LOS: 1 days | Discharge: HOME | DRG: 026 | End: 2025-01-07
Attending: PSYCHIATRY & NEUROLOGY | Admitting: PSYCHIATRY & NEUROLOGY
Payer: MEDICARE

## 2025-01-06 ENCOUNTER — ANESTHESIA EVENT (OUTPATIENT)
Dept: INTERVENTIONAL RADIOLOGY/VASCULAR | Age: 85
DRG: 026 | End: 2025-01-06
Payer: MEDICARE

## 2025-01-06 ENCOUNTER — ANESTHESIA (OUTPATIENT)
Dept: INTERVENTIONAL RADIOLOGY/VASCULAR | Age: 85
DRG: 026 | End: 2025-01-06
Payer: MEDICARE

## 2025-01-06 DIAGNOSIS — I72.9 ANEURYSM (HCC): ICD-10-CM

## 2025-01-06 DIAGNOSIS — I67.1 ANEURYSM OF INTRACRANIAL PORTION OF LEFT INTERNAL CAROTID ARTERY: ICD-10-CM

## 2025-01-06 PROBLEM — I72.0 ANEURYSM OF LEFT CAROTID ARTERY (HCC): Status: ACTIVE | Noted: 2025-01-06

## 2025-01-06 LAB
ABO + RH BLD: NORMAL
ACT BLD: 133 SEC (ref 79–149)
ACT BLD: 227 SEC (ref 79–149)
ACT BLD: 235 SEC (ref 79–149)
ACT BLD: 243 SEC (ref 79–149)
ACT BLD: 243 SEC (ref 79–149)
ACT BLD: 272 SEC (ref 79–149)
ARM BAND NUMBER: NORMAL
BLOOD BANK SAMPLE EXPIRATION: NORMAL
BLOOD GROUP ANTIBODIES SERPL: NEGATIVE
BUN BLD-MCNC: 21 MG/DL (ref 8–26)
CA-I BLD-SCNC: 1.27 MMOL/L (ref 1.15–1.33)
CHLORIDE BLD-SCNC: 106 MMOL/L (ref 98–107)
CLOSURE TME COLL+ADP BLD: 83 SEC (ref 67–112)
CO2 BLD CALC-SCNC: 25 MMOL/L (ref 22–30)
COLLAGEN EPINEPHRINE TIME: 112 SEC (ref 85–172)
EGFR, POC: >90 ML/MIN/1.73M2
GLUCOSE BLD-MCNC: 109 MG/DL (ref 74–100)
HCT VFR BLD AUTO: 48 % (ref 36–46)
PLATELET FUNCTION INTERP: NORMAL
POC ANION GAP: 13 MMOL/L (ref 7–16)
POC CREATININE: 0.5 MG/DL (ref 0.51–1.19)
POC HEMOGLOBIN (CALC): 16.5 G/DL (ref 12–16)
POC LACTIC ACID: 1.7 MMOL/L (ref 0.56–1.39)
POTASSIUM BLD-SCNC: 4.5 MMOL/L (ref 3.5–4.5)
SODIUM BLD-SCNC: 143 MMOL/L (ref 138–146)

## 2025-01-06 PROCEDURE — 2500000003 HC RX 250 WO HCPCS: Performed by: STUDENT IN AN ORGANIZED HEALTH CARE EDUCATION/TRAINING PROGRAM

## 2025-01-06 PROCEDURE — 2580000003 HC RX 258

## 2025-01-06 PROCEDURE — 86900 BLOOD TYPING SEROLOGIC ABO: CPT

## 2025-01-06 PROCEDURE — 3700000000 HC ANESTHESIA ATTENDED CARE

## 2025-01-06 PROCEDURE — 86850 RBC ANTIBODY SCREEN: CPT

## 2025-01-06 PROCEDURE — 94660 CPAP INITIATION&MGMT: CPT

## 2025-01-06 PROCEDURE — 2500000003 HC RX 250 WO HCPCS

## 2025-01-06 PROCEDURE — 61624 TCAT PERM OCCLS/EMBOLJ CNS: CPT

## 2025-01-06 PROCEDURE — 6360000002 HC RX W HCPCS

## 2025-01-06 PROCEDURE — 6360000004 HC RX CONTRAST MEDICATION: Performed by: PSYCHIATRY & NEUROLOGY

## 2025-01-06 PROCEDURE — 84520 ASSAY OF UREA NITROGEN: CPT

## 2025-01-06 PROCEDURE — 2000000000 HC ICU R&B

## 2025-01-06 PROCEDURE — B3171ZZ FLUOROSCOPY OF LEFT INTERNAL CAROTID ARTERY USING LOW OSMOLAR CONTRAST: ICD-10-PCS | Performed by: PSYCHIATRY & NEUROLOGY

## 2025-01-06 PROCEDURE — 36224 PLACE CATH CAROTD ART: CPT

## 2025-01-06 PROCEDURE — 86901 BLOOD TYPING SEROLOGIC RH(D): CPT

## 2025-01-06 PROCEDURE — 82947 ASSAY GLUCOSE BLOOD QUANT: CPT

## 2025-01-06 PROCEDURE — 5A09357 ASSISTANCE WITH RESPIRATORY VENTILATION, LESS THAN 24 CONSECUTIVE HOURS, CONTINUOUS POSITIVE AIRWAY PRESSURE: ICD-10-PCS | Performed by: PSYCHIATRY & NEUROLOGY

## 2025-01-06 PROCEDURE — 7100000001 HC PACU RECOVERY - ADDTL 15 MIN

## 2025-01-06 PROCEDURE — 85576 BLOOD PLATELET AGGREGATION: CPT

## 2025-01-06 PROCEDURE — C1887 CATHETER, GUIDING: HCPCS

## 2025-01-06 PROCEDURE — B3141ZZ FLUOROSCOPY OF LEFT COMMON CAROTID ARTERY USING LOW OSMOLAR CONTRAST: ICD-10-PCS | Performed by: PSYCHIATRY & NEUROLOGY

## 2025-01-06 PROCEDURE — 3700000001 HC ADD 15 MINUTES (ANESTHESIA)

## 2025-01-06 PROCEDURE — 6370000000 HC RX 637 (ALT 250 FOR IP): Performed by: PSYCHIATRY & NEUROLOGY

## 2025-01-06 PROCEDURE — 7100000000 HC PACU RECOVERY - FIRST 15 MIN

## 2025-01-06 PROCEDURE — 6370000000 HC RX 637 (ALT 250 FOR IP): Performed by: STUDENT IN AN ORGANIZED HEALTH CARE EDUCATION/TRAINING PROGRAM

## 2025-01-06 PROCEDURE — 80051 ELECTROLYTE PANEL: CPT

## 2025-01-06 PROCEDURE — 75894 X-RAYS TRANSCATH THERAPY: CPT

## 2025-01-06 PROCEDURE — 03VG3HZ RESTRICTION OF INTRACRANIAL ARTERY WITH INTRALUMINAL DEVICE, FLOW DIVERTER, PERCUTANEOUS APPROACH: ICD-10-PCS | Performed by: PSYCHIATRY & NEUROLOGY

## 2025-01-06 PROCEDURE — 75898 FOLLOW-UP ANGIOGRAPHY: CPT

## 2025-01-06 PROCEDURE — 82330 ASSAY OF CALCIUM: CPT

## 2025-01-06 PROCEDURE — 83605 ASSAY OF LACTIC ACID: CPT

## 2025-01-06 PROCEDURE — 6370000000 HC RX 637 (ALT 250 FOR IP)

## 2025-01-06 PROCEDURE — 76937 US GUIDE VASCULAR ACCESS: CPT

## 2025-01-06 PROCEDURE — 85347 COAGULATION TIME ACTIVATED: CPT

## 2025-01-06 PROCEDURE — 85014 HEMATOCRIT: CPT

## 2025-01-06 PROCEDURE — 82565 ASSAY OF CREATININE: CPT

## 2025-01-06 PROCEDURE — 2500000003 HC RX 250 WO HCPCS: Performed by: PSYCHIATRY & NEUROLOGY

## 2025-01-06 RX ORDER — ISOSORBIDE MONONITRATE 60 MG/1
60 TABLET, EXTENDED RELEASE ORAL DAILY
Status: DISCONTINUED | OUTPATIENT
Start: 2025-01-06 | End: 2025-01-07 | Stop reason: HOSPADM

## 2025-01-06 RX ORDER — CARVEDILOL 3.12 MG/1
6.25 TABLET ORAL 2 TIMES DAILY
Status: DISCONTINUED | OUTPATIENT
Start: 2025-01-06 | End: 2025-01-07 | Stop reason: HOSPADM

## 2025-01-06 RX ORDER — SODIUM CHLORIDE 9 MG/ML
INJECTION, SOLUTION INTRAVENOUS PRN
Status: DISCONTINUED | OUTPATIENT
Start: 2025-01-06 | End: 2025-01-06

## 2025-01-06 RX ORDER — SODIUM CHLORIDE 0.9 % (FLUSH) 0.9 %
5-40 SYRINGE (ML) INJECTION PRN
Status: DISCONTINUED | OUTPATIENT
Start: 2025-01-06 | End: 2025-01-06

## 2025-01-06 RX ORDER — HYDRALAZINE HYDROCHLORIDE 20 MG/ML
10 INJECTION INTRAMUSCULAR; INTRAVENOUS
Status: DISCONTINUED | OUTPATIENT
Start: 2025-01-06 | End: 2025-01-06

## 2025-01-06 RX ORDER — SODIUM CHLORIDE, SODIUM LACTATE, POTASSIUM CHLORIDE, CALCIUM CHLORIDE 600; 310; 30; 20 MG/100ML; MG/100ML; MG/100ML; MG/100ML
INJECTION, SOLUTION INTRAVENOUS
Status: DISCONTINUED | OUTPATIENT
Start: 2025-01-06 | End: 2025-01-06 | Stop reason: SDUPTHER

## 2025-01-06 RX ORDER — ONDANSETRON 2 MG/ML
4 INJECTION INTRAMUSCULAR; INTRAVENOUS EVERY 6 HOURS PRN
Status: DISCONTINUED | OUTPATIENT
Start: 2025-01-06 | End: 2025-01-07 | Stop reason: HOSPADM

## 2025-01-06 RX ORDER — SODIUM CHLORIDE 0.9 % (FLUSH) 0.9 %
5-40 SYRINGE (ML) INJECTION PRN
Status: DISCONTINUED | OUTPATIENT
Start: 2025-01-06 | End: 2025-01-07 | Stop reason: HOSPADM

## 2025-01-06 RX ORDER — SODIUM CHLORIDE 9 MG/ML
INJECTION, SOLUTION INTRAVENOUS CONTINUOUS
Status: DISCONTINUED | OUTPATIENT
Start: 2025-01-06 | End: 2025-01-07

## 2025-01-06 RX ORDER — LIDOCAINE HYDROCHLORIDE 10 MG/ML
INJECTION, SOLUTION EPIDURAL; INFILTRATION; INTRACAUDAL; PERINEURAL
Status: DISCONTINUED | OUTPATIENT
Start: 2025-01-06 | End: 2025-01-06 | Stop reason: SDUPTHER

## 2025-01-06 RX ORDER — SODIUM CHLORIDE 0.9 % (FLUSH) 0.9 %
5-40 SYRINGE (ML) INJECTION EVERY 12 HOURS SCHEDULED
Status: DISCONTINUED | OUTPATIENT
Start: 2025-01-06 | End: 2025-01-06

## 2025-01-06 RX ORDER — ACETAMINOPHEN 325 MG/1
650 TABLET ORAL EVERY 6 HOURS PRN
Status: DISCONTINUED | OUTPATIENT
Start: 2025-01-06 | End: 2025-01-07 | Stop reason: HOSPADM

## 2025-01-06 RX ORDER — ATORVASTATIN CALCIUM 10 MG/1
40 TABLET, FILM COATED ORAL DAILY
Status: DISCONTINUED | OUTPATIENT
Start: 2025-01-06 | End: 2025-01-07 | Stop reason: HOSPADM

## 2025-01-06 RX ORDER — PANTOPRAZOLE SODIUM 40 MG/1
40 TABLET, DELAYED RELEASE ORAL
Status: DISCONTINUED | OUTPATIENT
Start: 2025-01-07 | End: 2025-01-07 | Stop reason: HOSPADM

## 2025-01-06 RX ORDER — ACETAMINOPHEN 650 MG/1
650 SUPPOSITORY RECTAL EVERY 6 HOURS PRN
Status: DISCONTINUED | OUTPATIENT
Start: 2025-01-06 | End: 2025-01-07 | Stop reason: HOSPADM

## 2025-01-06 RX ORDER — GLYCOPYRROLATE 1 MG/5 ML
SYRINGE (ML) INTRAVENOUS
Status: DISCONTINUED | OUTPATIENT
Start: 2025-01-06 | End: 2025-01-06 | Stop reason: SDUPTHER

## 2025-01-06 RX ORDER — POTASSIUM CHLORIDE 7.45 MG/ML
10 INJECTION INTRAVENOUS PRN
Status: DISCONTINUED | OUTPATIENT
Start: 2025-01-06 | End: 2025-01-07 | Stop reason: HOSPADM

## 2025-01-06 RX ORDER — HYDRALAZINE HYDROCHLORIDE 20 MG/ML
10 INJECTION INTRAMUSCULAR; INTRAVENOUS EVERY 4 HOURS PRN
Status: DISCONTINUED | OUTPATIENT
Start: 2025-01-06 | End: 2025-01-07 | Stop reason: HOSPADM

## 2025-01-06 RX ORDER — CLOPIDOGREL BISULFATE 75 MG/1
75 TABLET ORAL DAILY
Status: DISCONTINUED | OUTPATIENT
Start: 2025-01-06 | End: 2025-01-07 | Stop reason: HOSPADM

## 2025-01-06 RX ORDER — POLYETHYLENE GLYCOL 3350 17 G/17G
17 POWDER, FOR SOLUTION ORAL DAILY PRN
Status: DISCONTINUED | OUTPATIENT
Start: 2025-01-06 | End: 2025-01-07 | Stop reason: HOSPADM

## 2025-01-06 RX ORDER — HEPARIN SODIUM 1000 [USP'U]/ML
INJECTION INTRAVENOUS; SUBCUTANEOUS
Status: DISCONTINUED | OUTPATIENT
Start: 2025-01-06 | End: 2025-01-06 | Stop reason: SDUPTHER

## 2025-01-06 RX ORDER — LABETALOL HYDROCHLORIDE 5 MG/ML
10 INJECTION, SOLUTION INTRAVENOUS EVERY 4 HOURS PRN
Status: DISCONTINUED | OUTPATIENT
Start: 2025-01-06 | End: 2025-01-07 | Stop reason: HOSPADM

## 2025-01-06 RX ORDER — SODIUM CHLORIDE 0.9 % (FLUSH) 0.9 %
5-40 SYRINGE (ML) INJECTION EVERY 12 HOURS SCHEDULED
Status: DISCONTINUED | OUTPATIENT
Start: 2025-01-06 | End: 2025-01-07 | Stop reason: HOSPADM

## 2025-01-06 RX ORDER — ENOXAPARIN SODIUM 100 MG/ML
30 INJECTION SUBCUTANEOUS 2 TIMES DAILY
Status: DISCONTINUED | OUTPATIENT
Start: 2025-01-06 | End: 2025-01-06

## 2025-01-06 RX ORDER — CEFAZOLIN SODIUM 1 G/3ML
INJECTION, POWDER, FOR SOLUTION INTRAMUSCULAR; INTRAVENOUS
Status: DISCONTINUED | OUTPATIENT
Start: 2025-01-06 | End: 2025-01-06 | Stop reason: SDUPTHER

## 2025-01-06 RX ORDER — IPRATROPIUM BROMIDE AND ALBUTEROL SULFATE 2.5; .5 MG/3ML; MG/3ML
1 SOLUTION RESPIRATORY (INHALATION)
Status: DISCONTINUED | OUTPATIENT
Start: 2025-01-06 | End: 2025-01-06

## 2025-01-06 RX ORDER — ENOXAPARIN SODIUM 100 MG/ML
40 INJECTION SUBCUTANEOUS DAILY
Status: DISCONTINUED | OUTPATIENT
Start: 2025-01-07 | End: 2025-01-06 | Stop reason: DRUGHIGH

## 2025-01-06 RX ORDER — SODIUM CHLORIDE 9 MG/ML
INJECTION, SOLUTION INTRAVENOUS PRN
Status: DISCONTINUED | OUTPATIENT
Start: 2025-01-06 | End: 2025-01-07 | Stop reason: HOSPADM

## 2025-01-06 RX ORDER — PROTAMINE SULFATE 10 MG/ML
INJECTION, SOLUTION INTRAVENOUS
Status: DISCONTINUED | OUTPATIENT
Start: 2025-01-06 | End: 2025-01-06 | Stop reason: SDUPTHER

## 2025-01-06 RX ORDER — IODIXANOL 270 MG/ML
30 INJECTION, SOLUTION INTRAVASCULAR
Status: COMPLETED | OUTPATIENT
Start: 2025-01-06 | End: 2025-01-06

## 2025-01-06 RX ORDER — ASPIRIN 81 MG/1
81 TABLET, CHEWABLE ORAL ONCE
Status: COMPLETED | OUTPATIENT
Start: 2025-01-06 | End: 2025-01-06

## 2025-01-06 RX ORDER — PROPOFOL 10 MG/ML
INJECTION, EMULSION INTRAVENOUS
Status: DISCONTINUED | OUTPATIENT
Start: 2025-01-06 | End: 2025-01-06 | Stop reason: SDUPTHER

## 2025-01-06 RX ORDER — POTASSIUM CHLORIDE 1500 MG/1
40 TABLET, EXTENDED RELEASE ORAL PRN
Status: DISCONTINUED | OUTPATIENT
Start: 2025-01-06 | End: 2025-01-07 | Stop reason: HOSPADM

## 2025-01-06 RX ORDER — DEXAMETHASONE SODIUM PHOSPHATE 10 MG/ML
INJECTION, SOLUTION INTRAMUSCULAR; INTRAVENOUS
Status: DISCONTINUED | OUTPATIENT
Start: 2025-01-06 | End: 2025-01-06 | Stop reason: SDUPTHER

## 2025-01-06 RX ORDER — ROCURONIUM BROMIDE 10 MG/ML
INJECTION, SOLUTION INTRAVENOUS
Status: DISCONTINUED | OUTPATIENT
Start: 2025-01-06 | End: 2025-01-06 | Stop reason: SDUPTHER

## 2025-01-06 RX ORDER — LABETALOL HYDROCHLORIDE 5 MG/ML
10 INJECTION, SOLUTION INTRAVENOUS
Status: DISCONTINUED | OUTPATIENT
Start: 2025-01-06 | End: 2025-01-06

## 2025-01-06 RX ORDER — ONDANSETRON 2 MG/ML
INJECTION INTRAMUSCULAR; INTRAVENOUS
Status: DISCONTINUED | OUTPATIENT
Start: 2025-01-06 | End: 2025-01-06 | Stop reason: SDUPTHER

## 2025-01-06 RX ORDER — MAGNESIUM SULFATE IN WATER 40 MG/ML
2000 INJECTION, SOLUTION INTRAVENOUS PRN
Status: DISCONTINUED | OUTPATIENT
Start: 2025-01-06 | End: 2025-01-07 | Stop reason: HOSPADM

## 2025-01-06 RX ORDER — DIPHENHYDRAMINE HYDROCHLORIDE 50 MG/ML
12.5 INJECTION INTRAMUSCULAR; INTRAVENOUS
Status: DISCONTINUED | OUTPATIENT
Start: 2025-01-06 | End: 2025-01-06

## 2025-01-06 RX ORDER — NALOXONE HYDROCHLORIDE 0.4 MG/ML
INJECTION, SOLUTION INTRAMUSCULAR; INTRAVENOUS; SUBCUTANEOUS PRN
Status: DISCONTINUED | OUTPATIENT
Start: 2025-01-06 | End: 2025-01-06

## 2025-01-06 RX ORDER — ONDANSETRON 4 MG/1
4 TABLET, ORALLY DISINTEGRATING ORAL EVERY 8 HOURS PRN
Status: DISCONTINUED | OUTPATIENT
Start: 2025-01-06 | End: 2025-01-07 | Stop reason: HOSPADM

## 2025-01-06 RX ORDER — CETIRIZINE HYDROCHLORIDE 10 MG/1
5 TABLET ORAL DAILY
Status: DISCONTINUED | OUTPATIENT
Start: 2025-01-06 | End: 2025-01-07 | Stop reason: HOSPADM

## 2025-01-06 RX ORDER — PROCHLORPERAZINE EDISYLATE 5 MG/ML
5 INJECTION INTRAMUSCULAR; INTRAVENOUS
Status: DISCONTINUED | OUTPATIENT
Start: 2025-01-06 | End: 2025-01-06

## 2025-01-06 RX ORDER — FENTANYL CITRATE 50 UG/ML
INJECTION, SOLUTION INTRAMUSCULAR; INTRAVENOUS
Status: DISCONTINUED | OUTPATIENT
Start: 2025-01-06 | End: 2025-01-06 | Stop reason: SDUPTHER

## 2025-01-06 RX ORDER — ENOXAPARIN SODIUM 100 MG/ML
40 INJECTION SUBCUTANEOUS DAILY
Status: DISCONTINUED | OUTPATIENT
Start: 2025-01-07 | End: 2025-01-07 | Stop reason: HOSPADM

## 2025-01-06 RX ORDER — AMLODIPINE BESYLATE 10 MG/1
5 TABLET ORAL NIGHTLY
Status: DISCONTINUED | OUTPATIENT
Start: 2025-01-06 | End: 2025-01-07 | Stop reason: HOSPADM

## 2025-01-06 RX ADMIN — IODIXANOL 30 ML: 270 INJECTION, SOLUTION INTRAVASCULAR at 15:21

## 2025-01-06 RX ADMIN — PHENYLEPHRINE HYDROCHLORIDE 50 MCG: 10 INJECTION INTRAVENOUS at 13:08

## 2025-01-06 RX ADMIN — ROCURONIUM BROMIDE 50 MG: 10 INJECTION, SOLUTION INTRAVENOUS at 12:21

## 2025-01-06 RX ADMIN — ROCURONIUM BROMIDE 20 MG: 10 INJECTION, SOLUTION INTRAVENOUS at 14:16

## 2025-01-06 RX ADMIN — HEPARIN SODIUM 7000 UNITS: 1000 INJECTION, SOLUTION INTRAVENOUS; SUBCUTANEOUS at 12:57

## 2025-01-06 RX ADMIN — FENTANYL CITRATE 25 MCG: 50 INJECTION, SOLUTION INTRAMUSCULAR; INTRAVENOUS at 15:15

## 2025-01-06 RX ADMIN — ROCURONIUM BROMIDE 10 MG: 10 INJECTION, SOLUTION INTRAVENOUS at 12:58

## 2025-01-06 RX ADMIN — PHENYLEPHRINE HYDROCHLORIDE 50 MCG: 10 INJECTION INTRAVENOUS at 13:33

## 2025-01-06 RX ADMIN — PHENYLEPHRINE HYDROCHLORIDE 50 MCG: 10 INJECTION INTRAVENOUS at 12:45

## 2025-01-06 RX ADMIN — ISOSORBIDE MONONITRATE 60 MG: 60 TABLET, EXTENDED RELEASE ORAL at 20:08

## 2025-01-06 RX ADMIN — PROTAMINE SULFATE 10 MG: 10 INJECTION, SOLUTION INTRAVENOUS at 15:13

## 2025-01-06 RX ADMIN — FENTANYL CITRATE 50 MCG: 50 INJECTION, SOLUTION INTRAMUSCULAR; INTRAVENOUS at 12:21

## 2025-01-06 RX ADMIN — ASPIRIN 81 MG 81 MG: 81 TABLET ORAL at 12:04

## 2025-01-06 RX ADMIN — PHENYLEPHRINE HYDROCHLORIDE 50 MCG: 10 INJECTION INTRAVENOUS at 14:21

## 2025-01-06 RX ADMIN — ROCURONIUM BROMIDE 20 MG: 10 INJECTION, SOLUTION INTRAVENOUS at 13:26

## 2025-01-06 RX ADMIN — CARVEDILOL 6.25 MG: 3.12 TABLET, FILM COATED ORAL at 20:07

## 2025-01-06 RX ADMIN — FENTANYL CITRATE 25 MCG: 50 INJECTION, SOLUTION INTRAMUSCULAR; INTRAVENOUS at 15:25

## 2025-01-06 RX ADMIN — PHENYLEPHRINE HYDROCHLORIDE 100 MCG: 10 INJECTION INTRAVENOUS at 12:58

## 2025-01-06 RX ADMIN — SODIUM CHLORIDE, POTASSIUM CHLORIDE, SODIUM LACTATE AND CALCIUM CHLORIDE: 600; 310; 30; 20 INJECTION, SOLUTION INTRAVENOUS at 12:01

## 2025-01-06 RX ADMIN — PHENYLEPHRINE HYDROCHLORIDE 50 MCG: 10 INJECTION INTRAVENOUS at 14:24

## 2025-01-06 RX ADMIN — HYDRALAZINE HYDROCHLORIDE 10 MG: 20 INJECTION INTRAMUSCULAR; INTRAVENOUS at 17:23

## 2025-01-06 RX ADMIN — PHENYLEPHRINE HYDROCHLORIDE 50 MCG: 10 INJECTION INTRAVENOUS at 14:33

## 2025-01-06 RX ADMIN — PROTAMINE SULFATE 10 MG: 10 INJECTION, SOLUTION INTRAVENOUS at 15:14

## 2025-01-06 RX ADMIN — LIDOCAINE HYDROCHLORIDE 50 MG: 10 INJECTION, SOLUTION EPIDURAL; INFILTRATION; INTRACAUDAL; PERINEURAL at 12:21

## 2025-01-06 RX ADMIN — DEXAMETHASONE SODIUM PHOSPHATE 6 MG: 10 INJECTION INTRAMUSCULAR; INTRAVENOUS at 12:39

## 2025-01-06 RX ADMIN — SUGAMMADEX 200 MG: 100 INJECTION, SOLUTION INTRAVENOUS at 15:17

## 2025-01-06 RX ADMIN — CLOPIDOGREL BISULFATE 75 MG: 75 TABLET ORAL at 12:04

## 2025-01-06 RX ADMIN — PHENYLEPHRINE HYDROCHLORIDE 50 MCG: 10 INJECTION INTRAVENOUS at 15:02

## 2025-01-06 RX ADMIN — ROCURONIUM BROMIDE 10 MG: 10 INJECTION, SOLUTION INTRAVENOUS at 14:34

## 2025-01-06 RX ADMIN — PROTAMINE SULFATE 10 MG: 10 INJECTION, SOLUTION INTRAVENOUS at 15:12

## 2025-01-06 RX ADMIN — SODIUM CHLORIDE, PRESERVATIVE FREE 10 ML: 5 INJECTION INTRAVENOUS at 20:07

## 2025-01-06 RX ADMIN — SODIUM CHLORIDE, PRESERVATIVE FREE 10 ML: 5 INJECTION INTRAVENOUS at 20:09

## 2025-01-06 RX ADMIN — ACETAMINOPHEN 650 MG: 325 TABLET ORAL at 18:51

## 2025-01-06 RX ADMIN — PHENYLEPHRINE HYDROCHLORIDE 50 MCG: 10 INJECTION INTRAVENOUS at 14:45

## 2025-01-06 RX ADMIN — ONDANSETRON 4 MG: 2 INJECTION INTRAMUSCULAR; INTRAVENOUS at 15:10

## 2025-01-06 RX ADMIN — ROCURONIUM BROMIDE 10 MG: 10 INJECTION, SOLUTION INTRAVENOUS at 13:51

## 2025-01-06 RX ADMIN — LABETALOL HYDROCHLORIDE 10 MG: 5 INJECTION, SOLUTION INTRAVENOUS at 18:51

## 2025-01-06 RX ADMIN — AMLODIPINE BESYLATE 5 MG: 10 TABLET ORAL at 20:07

## 2025-01-06 RX ADMIN — PHENYLEPHRINE HYDROCHLORIDE 50 MCG: 10 INJECTION INTRAVENOUS at 13:53

## 2025-01-06 RX ADMIN — ROCURONIUM BROMIDE 10 MG: 10 INJECTION, SOLUTION INTRAVENOUS at 14:45

## 2025-01-06 RX ADMIN — PHENYLEPHRINE HYDROCHLORIDE 100 MCG: 10 INJECTION INTRAVENOUS at 12:51

## 2025-01-06 RX ADMIN — HEPARIN SODIUM 1000 UNITS: 1000 INJECTION, SOLUTION INTRAVENOUS; SUBCUTANEOUS at 14:08

## 2025-01-06 RX ADMIN — ATORVASTATIN CALCIUM 40 MG: 10 TABLET, FILM COATED ORAL at 20:07

## 2025-01-06 RX ADMIN — PHENYLEPHRINE HYDROCHLORIDE 50 MCG: 10 INJECTION INTRAVENOUS at 13:40

## 2025-01-06 RX ADMIN — PROPOFOL 130 MG: 10 INJECTION, EMULSION INTRAVENOUS at 12:21

## 2025-01-06 RX ADMIN — Medication 0.2 MG: at 13:08

## 2025-01-06 RX ADMIN — CEFAZOLIN 2 G: 1 INJECTION, POWDER, FOR SOLUTION INTRAMUSCULAR; INTRAVENOUS at 12:34

## 2025-01-06 RX ADMIN — HEPARIN SODIUM 2000 UNITS: 1000 INJECTION, SOLUTION INTRAVENOUS; SUBCUTANEOUS at 13:43

## 2025-01-06 RX ADMIN — PHENYLEPHRINE HYDROCHLORIDE 50 MCG: 10 INJECTION INTRAVENOUS at 12:39

## 2025-01-06 RX ADMIN — HEPARIN SODIUM 500 UNITS: 1000 INJECTION, SOLUTION INTRAVENOUS; SUBCUTANEOUS at 13:20

## 2025-01-06 ASSESSMENT — ENCOUNTER SYMPTOMS
SHORTNESS OF BREATH: 1
SHORTNESS OF BREATH: 0
NAUSEA: 0
COUGH: 0
PHOTOPHOBIA: 0
VOICE CHANGE: 0
COLOR CHANGE: 0
VOMITING: 0

## 2025-01-06 ASSESSMENT — PAIN - FUNCTIONAL ASSESSMENT: PAIN_FUNCTIONAL_ASSESSMENT: 0-10

## 2025-01-06 NOTE — BRIEF OP NOTE
[facility]    NEUROENDOVASCULAR SERVICE: POST-OP NOTE: 1/6/2025    Pt Name: Tracey Alarcon  MRN: [id number]  YOB: 1940  Date of Procedure: 1/6/2025  Primary Care Physician: Aura Rosado MD      Pre-Procedural Diagnosis: Left cavernous ICA aneurysm  Post-Procedural Diagnosis: Status post pipeline shield embolization      Procedure Performed:Diagnostic Cerebral Angiogram with pipeline shield flow diversion embolization    Surgeon:   Americo Braden MD    Fellow:  Aníbal Vigil MD and Kehinde Olson MD PhD     Assisting Tech:  Lala Aviles    Resident:  Cal Matos MD    PRE-PROCEDURAL EXAM:  Neurological exam performed and unchanged from initial H&P or consult      Anesthesia: General Anesthesia  An Immediate re-assessment was completed prior to sedation, and it is determined to be safe to proceed.  Complications: none    Intra-Operative EXAM:  Patient sedated with unchanged limited neurological exam    EBL: < Minimal      Cc            Specimens: Were not Obtained  Contrast:     Visipaque 270 low osmolar 30 Cc             Fluoro: 49.2 min    Findings:  Please see dictated Radiology note for further details  L ICA cavernous saccular aneurysm measuring 5 in width x 5.19 mm in height. .   The above lesion was treated with 6 Cuban Cook shuttle, Berenstein diagnostic catheter and stiff glide wire.   Once the aneurysm surgically evaluated.  Phenom plus, phenom 27 and Synchro Support Pre-shaped were advanced coaxially across the aneurysm into the MCA/M1 horizontal segment.   The microwire was removed and a 5x20mm Pipeline Shield flow diverter device was advanced and the device was deployed across the aneurysm.  The \"catheter bumping\" technique was performed gently using the Phenom Plus and Phenom 27 catheters, followed by the \"micro-J wire\" technique to achieve improved apposition.  Final angiographic run demonstrated patent well apposed FD system across the aneurysm with

## 2025-01-06 NOTE — H&P
Endovascular Neurosurgery H&P      HPI:     History of Present Illness  Tracey Alarcon is an 84-year-old female who presents for evaluation of a left cavernous 3 x 5 mm aneurysm.    She was admitted to [facility] for a workup due to fatigue and facial droop. During that workup, she had an MRI and EEG. The EEG showed no evidence of any strokes or seizures. She was discharged with further treatment for high blood pressure and on a baby aspirin    Due to persist encephalopathy after discharge she sought a second opinion from Dr. Joan Ramey in Neurology the following week, who suggested that her symptoms might be due to a urinary tract infection (UTI). After taking antibiotics three days, she regained her ability to speak and the staring spells ceased. She has a history of UTI, htn, hld    She experiences difficulty walking due to balance issues and fatigue but manages to move around her house without a cane, using furniture for support. For longer distances, she uses a rolling walker. She has been diagnosed with sleep apnea and uses a CPAP machine.    She also has a right shoulder issue, which she is managing with therapy and has decided against surgery. Additionally, she has claustrophobia for MRI imaging    She is n.p.o. and took her Plavix and aspirin last night.  Will give her her dose now and proceed with left ICA cavernous segment aneurysm embolization under general anesthesia    SOCIAL HISTORY  The patient denies smoking.    FAMILY HISTORY  Her son had an aneurysm in the back of his head.    .    Allergies   Allergen Reactions    Medrol [Methylprednisolone] Other (See Comments)     Unknown reaction, \"I don't remember. It just made me feel funny.\"     Current Outpatient Medications   Medication Sig Dispense Refill    carvedilol (COREG) 6.25 MG tablet Take 1 tablet by mouth 2 times daily 180 tablet 1    ZINC PO Take 1 tablet by mouth daily      amLODIPine (NORVASC) 5 MG tablet Take 1 tablet by mouth 
05:47 AM    DIFFTYPE NOT REPORTED 08/08/2021 05:02 AM     BMP:    Lab Results   Component Value Date/Time     04/22/2024 02:19 PM    K 4.6 04/22/2024 02:19 PM    CL 99 04/22/2024 02:19 PM    CO2 28 04/22/2024 02:19 PM    BUN 15 04/22/2024 02:19 PM    CREATININE 0.5 01/06/2025 09:55 AM    CREATININE 0.7 04/22/2024 02:19 PM    CALCIUM 9.4 04/22/2024 02:19 PM    GFRAA >60 08/08/2021 05:02 AM    LABGLOM 86 04/22/2024 02:19 PM    GLUCOSE 104 04/22/2024 02:19 PM       RADIOLOGY:     Labs and Images reviewed with:    [] Jacinto Longoria MD  [x] Gary Stephens MD  [] Santiago Ruffin MD  --[] there are no new interval images to review.     ASSESSMENT AND PLAN:         ASSESSMENT:     This is a 84 y.o. female with left cavernous 3 x 5 mm aneurysm, post cerebral angiogram with intervention.  General anesthesia and patient was extubated postoperatively. Admitted to the neuro ICU for close monitoring and blood pressure control.    Patient care will be discussed with attending, will reevaluate patient along with attending.     PLAN/MEDICAL DECISION MAKING:    NEUROLOGIC:  Left cavernous 3 x 5 mm aneurysm, post cerebral angiogram with intervention  - Goal -140  - Continue with DAPT  - Neuro checks per protocol    CARDIOVASCULAR:  Hypertension   - on Norvasc, carvedilol and Imdur - home medications restarted   - Goal -140  - Continue telemetry    PULMONARY:  Sleep apnea   - Keep oxygen saturation >92%  - CPAP at night     RENAL/FLUID/ELECTROLYTE:  - BUN / Creatinine --> pending   - Urine output: monitor output   - IVF: 75cc/hr NS   - Replace electrolytes PRN  - Daily BMP    GI/NUTRITION:  NUTRITION:  ADULT DIET; Regular  - Bowel regimen: glycolax as needed   - GI prophylaxis: Protonix - home medication     ID:  - Tmax afebrile   - Continue to monitor for fevers  - Daily CBC    HEME:   - H&H -pending   - Platelets pending   - Daily CBC    ENDOCRINE:  - Continue to monitor blood glucose, goal <180  - low dose insulin

## 2025-01-07 VITALS
TEMPERATURE: 97.6 F | HEIGHT: 62 IN | OXYGEN SATURATION: 95 % | BODY MASS INDEX: 40.85 KG/M2 | RESPIRATION RATE: 17 BRPM | DIASTOLIC BLOOD PRESSURE: 67 MMHG | HEART RATE: 73 BPM | SYSTOLIC BLOOD PRESSURE: 138 MMHG | WEIGHT: 222 LBS

## 2025-01-07 PROBLEM — I72.9 ANEURYSM (HCC): Status: ACTIVE | Noted: 2025-01-07

## 2025-01-07 LAB
ANION GAP SERPL CALCULATED.3IONS-SCNC: 12 MMOL/L (ref 9–16)
BASOPHILS # BLD: <0.03 K/UL (ref 0–0.2)
BASOPHILS NFR BLD: 0 % (ref 0–2)
BUN SERPL-MCNC: 18 MG/DL (ref 8–23)
CALCIUM SERPL-MCNC: 9.3 MG/DL (ref 8.6–10.4)
CHLORIDE SERPL-SCNC: 105 MMOL/L (ref 98–107)
CO2 SERPL-SCNC: 22 MMOL/L (ref 20–31)
CREAT SERPL-MCNC: 0.8 MG/DL (ref 0.6–0.9)
EOSINOPHIL # BLD: <0.03 K/UL (ref 0–0.44)
EOSINOPHILS RELATIVE PERCENT: 0 % (ref 1–4)
ERYTHROCYTE [DISTWIDTH] IN BLOOD BY AUTOMATED COUNT: 13.3 % (ref 11.8–14.4)
GFR, ESTIMATED: 73 ML/MIN/1.73M2
GLUCOSE SERPL-MCNC: 142 MG/DL (ref 74–99)
HCT VFR BLD AUTO: 40.2 % (ref 36.3–47.1)
HGB BLD-MCNC: 13.1 G/DL (ref 11.9–15.1)
IMM GRANULOCYTES # BLD AUTO: 0.06 K/UL (ref 0–0.3)
IMM GRANULOCYTES NFR BLD: 1 %
LYMPHOCYTES NFR BLD: 1.2 K/UL (ref 1.1–3.7)
LYMPHOCYTES RELATIVE PERCENT: 12 % (ref 24–43)
MCH RBC QN AUTO: 29.9 PG (ref 25.2–33.5)
MCHC RBC AUTO-ENTMCNC: 32.6 G/DL (ref 28.4–34.8)
MCV RBC AUTO: 91.8 FL (ref 82.6–102.9)
MONOCYTES NFR BLD: 0.39 K/UL (ref 0.1–1.2)
MONOCYTES NFR BLD: 4 % (ref 3–12)
NEUTROPHILS NFR BLD: 83 % (ref 36–65)
NEUTS SEG NFR BLD: 8.27 K/UL (ref 1.5–8.1)
NRBC BLD-RTO: 0 PER 100 WBC
PLATELET # BLD AUTO: 286 K/UL (ref 138–453)
PMV BLD AUTO: 9.7 FL (ref 8.1–13.5)
POTASSIUM SERPL-SCNC: 4.4 MMOL/L (ref 3.7–5.3)
RBC # BLD AUTO: 4.38 M/UL (ref 3.95–5.11)
SODIUM SERPL-SCNC: 139 MMOL/L (ref 136–145)
WBC OTHER # BLD: 9.9 K/UL (ref 3.5–11.3)

## 2025-01-07 PROCEDURE — 99223 1ST HOSP IP/OBS HIGH 75: CPT | Performed by: PSYCHIATRY & NEUROLOGY

## 2025-01-07 PROCEDURE — 6370000000 HC RX 637 (ALT 250 FOR IP): Performed by: STUDENT IN AN ORGANIZED HEALTH CARE EDUCATION/TRAINING PROGRAM

## 2025-01-07 PROCEDURE — 6370000000 HC RX 637 (ALT 250 FOR IP)

## 2025-01-07 PROCEDURE — 2500000003 HC RX 250 WO HCPCS: Performed by: PSYCHIATRY & NEUROLOGY

## 2025-01-07 PROCEDURE — 2500000003 HC RX 250 WO HCPCS: Performed by: STUDENT IN AN ORGANIZED HEALTH CARE EDUCATION/TRAINING PROGRAM

## 2025-01-07 PROCEDURE — 85025 COMPLETE CBC W/AUTO DIFF WBC: CPT

## 2025-01-07 PROCEDURE — 6360000002 HC RX W HCPCS

## 2025-01-07 PROCEDURE — 80048 BASIC METABOLIC PNL TOTAL CA: CPT

## 2025-01-07 RX ORDER — CLOPIDOGREL BISULFATE 75 MG/1
75 TABLET ORAL DAILY
Qty: 90 TABLET | Refills: 1 | Status: SHIPPED | OUTPATIENT
Start: 2025-01-07

## 2025-01-07 RX ORDER — ASPIRIN 81 MG/1
81 TABLET ORAL DAILY
Qty: 30 TABLET | Refills: 4 | Status: SHIPPED | OUTPATIENT
Start: 2025-01-07

## 2025-01-07 RX ADMIN — SODIUM CHLORIDE, PRESERVATIVE FREE 10 ML: 5 INJECTION INTRAVENOUS at 07:44

## 2025-01-07 RX ADMIN — CARVEDILOL 6.25 MG: 3.12 TABLET, FILM COATED ORAL at 07:36

## 2025-01-07 RX ADMIN — ENOXAPARIN SODIUM 40 MG: 100 INJECTION SUBCUTANEOUS at 08:24

## 2025-01-07 RX ADMIN — ISOSORBIDE MONONITRATE 60 MG: 60 TABLET, EXTENDED RELEASE ORAL at 08:24

## 2025-01-07 RX ADMIN — PANTOPRAZOLE SODIUM 40 MG: 40 TABLET, DELAYED RELEASE ORAL at 07:35

## 2025-01-07 RX ADMIN — CLOPIDOGREL BISULFATE 75 MG: 75 TABLET ORAL at 07:36

## 2025-01-07 RX ADMIN — SODIUM CHLORIDE, PRESERVATIVE FREE 10 ML: 5 INJECTION INTRAVENOUS at 07:36

## 2025-01-07 NOTE — PLAN OF CARE
Problem: Pain  Goal: Verbalizes/displays adequate comfort level or baseline comfort level  1/7/2025 0445 by Kamryn Ortega RN  Outcome: Progressing     Problem: Discharge Planning  Goal: Discharge to home or other facility with appropriate resources  1/7/2025 0445 by Kamryn Ortega RN  Outcome: Progressing  Flowsheets  Taken 1/7/2025 0400  Discharge to home or other facility with appropriate resources: Identify barriers to discharge with patient and caregiver  Taken 1/7/2025 0000  Discharge to home or other facility with appropriate resources: Identify barriers to discharge with patient and caregiver  Taken 1/6/2025 2000  Discharge to home or other facility with appropriate resources: Identify barriers to discharge with patient and caregiver     Problem: Safety - Adult  Goal: Free from fall injury  1/7/2025 0445 by Kamryn Ortega RN  Outcome: Progressing  Flowsheets (Taken 1/6/2025 2000)  Free From Fall Injury: Instruct family/caregiver on patient safety     Problem: Skin/Tissue Integrity  Goal: Absence of new skin breakdown  Description: 1.  Monitor for areas of redness and/or skin breakdown  2.  Assess vascular access sites hourly  3.  Every 4-6 hours minimum:  Change oxygen saturation probe site  4.  Every 4-6 hours:  If on nasal continuous positive airway pressure, respiratory therapy assess nares and determine need for appliance change or resting period.  1/7/2025 0445 by Kamryn Ortega RN  Outcome: Progressing     Problem: ABCDS Injury Assessment  Goal: Absence of physical injury  1/7/2025 0445 by Kamryn Ortega RN  Outcome: Progressing  Flowsheets (Taken 1/6/2025 2000)  Absence of Physical Injury: Implement safety measures based on patient assessment

## 2025-01-07 NOTE — DISCHARGE INSTRUCTIONS
Please take all medications as prescribed. Please follow up with your primary care physician by calling today for the first available appointment. If you do not have a primary care physician, please contact a physician or clinic listed below today to establish care. Please return to the emergency department sooner if you develop uncontrolled fevers, uncontrolled  vomiting, or any other concerns.

## 2025-01-07 NOTE — PROGRESS NOTES
Endovascular Neurosurgery Progress Note    Pt Name: Tracey Alarcon  MRN: [id number]  YOB: 1940  Date of evaluation: 1/7/2025  Primary Care Physician: Aura Rosado MD      SUBJECTIVE:   No acute events overnight from a neuroendovascular perspective. 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 Doing well. Groin site soft and clean.       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     She was admitted to [facility] for a workup due to fatigue and facial droop. During that workup, she had an MRI and EEG. The EEG showed no evidence of any strokes or seizures. She was discharged with further treatment for high blood pressure and on a baby aspirin     Due to persist encephalopathy after discharge she sought a second opinion from Dr. Joan Ramey in Neurology the following week, who suggested that her symptoms might be due to a urinary tract infection (UTI). After taking antibiotics three days, she regained her ability to speak and the staring spells ceased. She has a history of UTI, htn, hld     She experiences difficulty walking due to balance issues and fatigue but manages to move around her house without a cane, using furniture for support. For longer distances, she uses a rolling walker. She has been diagnosed with sleep apnea and uses a CPAP machine.     She also has a right shoulder issue, which she is managing with therapy and has decided against surgery. Additionally, she has claustrophobia for MRI imaging     She is n.p.o. and took her Plavix and aspirin last night.  Will give her her dose now and proceed with left ICA cavernous segment aneurysm embolization under general anesthesia  Allergies  is allergic to medrol [methylprednisolone].  Medications  Prior to Admission medications    Medication Sig Start Date End Date Taking? Authorizing Provider   carvedilol (COREG) 
Replace electrolytes PRN  - Daily BMP    GI/NUTRITION:  NUTRITION:  ADULT DIET; Regular  - Bowel regimen: Glycolax as needed     ID:  - Afebrile   - WBC 9.9  - Continue to monitor for fevers  - Daily CBC    HEME:   - H&H 13.1/40.2  - Platelets 286  - Daily CBC    ENDOCRINE:  - Continue to monitor blood glucose, goal <180    OTHER:  - PT/OT/ST     DVT PROPHYLAXIS:  - SCD sleeves - Thigh High   - Lovenox  - Aspirin  - Plavix      DISPOSITION:  [x] OK for out of ICU from Neuro Critical Care standpoint    We will continue to follow along.     For any changes in exam or patient status please contact Neuro Critical Care.          LILLIAN Sams - CNP  Neuro Critical Care  1/7/2025     6:04 AM

## 2025-01-07 NOTE — PLAN OF CARE
Problem: Pain  Goal: Verbalizes/displays adequate comfort level or baseline comfort level  1/7/2025 1203 by Kyra Reardon RN  Outcome: Completed  1/7/2025 0445 by Kamryn Ortega RN  Outcome: Progressing     Problem: Discharge Planning  Goal: Discharge to home or other facility with appropriate resources  1/7/2025 1203 by Kyra Reardon RN  Outcome: Completed  1/7/2025 0445 by Kamryn Ortega RN  Outcome: Progressing  Flowsheets  Taken 1/7/2025 0400  Discharge to home or other facility with appropriate resources: Identify barriers to discharge with patient and caregiver  Taken 1/7/2025 0000  Discharge to home or other facility with appropriate resources: Identify barriers to discharge with patient and caregiver  Taken 1/6/2025 2000  Discharge to home or other facility with appropriate resources: Identify barriers to discharge with patient and caregiver     Problem: Safety - Adult  Goal: Free from fall injury  1/7/2025 1203 by Kyra Reardon RN  Outcome: Completed  1/7/2025 0445 by Kamryn Ortega RN  Outcome: Progressing  Flowsheets (Taken 1/6/2025 2000)  Free From Fall Injury: Instruct family/caregiver on patient safety     Problem: Skin/Tissue Integrity  Goal: Absence of new skin breakdown  Description: 1.  Monitor for areas of redness and/or skin breakdown  2.  Assess vascular access sites hourly  3.  Every 4-6 hours minimum:  Change oxygen saturation probe site  4.  Every 4-6 hours:  If on nasal continuous positive airway pressure, respiratory therapy assess nares and determine need for appliance change or resting period.  1/7/2025 1203 by Kyra Reardon RN  Outcome: Completed  1/7/2025 0445 by Kamryn Ortega RN  Outcome: Progressing     Problem: ABCDS Injury Assessment  Goal: Absence of physical injury  1/7/2025 1203 by Kyra Reardon RN  Outcome: Completed  1/7/2025 0445 by Kamryn Ortega RN  Outcome: Progressing  Flowsheets (Taken 1/6/2025 2000)  Absence of Physical Injury:

## 2025-01-07 NOTE — ANESTHESIA POSTPROCEDURE EVALUATION
Department of Anesthesiology  Postprocedure Note    Patient: Tracey Alarcon  MRN: [id number]  YOB: 1940  Date of evaluation: 1/7/2025    Procedure Summary       Date: 01/06/25 Room / Location: [facility]    Anesthesia Start: 1210 Anesthesia Stop: 1545    Procedure: IR ANGIOGRAM CAROTID CEREBRAL BILATERAL Diagnosis:       Aneurysm (HCC)      Aneurysm of left carotid artery (HCC)    Scheduled Providers:  Responsible Provider: Ranjit Trotter MD    Anesthesia Type: General ASA Status: 3            Anesthesia Type: General    Kevin Phase I: Kevin Score: 9    Kevin Phase II:      Anesthesia Post Evaluation    Patient location during evaluation: ICU  Patient participation: complete - patient participated  Level of consciousness: awake and alert  Airway patency: patent  Nausea & Vomiting: no nausea and no vomiting  Cardiovascular status: hemodynamically stable  Respiratory status: acceptable  Hydration status: euvolemic  Pain management: adequate    No notable events documented.  
no

## 2025-01-07 NOTE — CARE COORDINATION
Attempted to see patient for initial assessment.  As I entered the room patient was getting ready to walk the halls with the RN.  I noted that she is using her own cane.  She tells me that that is the only equipment she needs or has.  Her plan is to return home independently.  She states no needs.    Yes-Patient/Caregiver accepts free interpretation services...

## 2025-01-08 ENCOUNTER — CARE COORDINATION (OUTPATIENT)
Dept: CARE COORDINATION | Age: 85
End: 2025-01-08

## 2025-01-08 DIAGNOSIS — I67.1 ANEURYSM OF CAVERNOUS PORTION OF LEFT INTERNAL CAROTID ARTERY: Primary | ICD-10-CM

## 2025-01-08 PROCEDURE — 1111F DSCHRG MED/CURRENT MED MERGE: CPT

## 2025-01-08 NOTE — CARE COORDINATION
HFU appt, sent request to pcp office.   Future Appointments         Provider Specialty Dept Phone    3/7/2025 11:00 AM Aura Rosado MD Internal Medicine [phone]    4/1/2025 1:00 PM Holland Wang MD Neurology [phone]    4/4/2025 2:00 PM Aníbal Vigil MD Neurology [phone]    7/9/2025 10:30 AM Americo Braden MD Neurology [phone]            Care Transition Nurse provided contact information.  Plan for follow-up call in 2-5 days based on severity of symptoms and risk factors.  Plan for next call: symptom management-follow up on RPM, fatigue, BPs  follow-up appointment-check on sooner HFU appt      Nessa Muse RN

## 2025-01-09 ENCOUNTER — APPOINTMENT (OUTPATIENT)
Dept: CT IMAGING | Age: 85
End: 2025-01-09
Payer: MEDICARE

## 2025-01-09 ENCOUNTER — HOSPITAL ENCOUNTER (EMERGENCY)
Age: 85
Discharge: HOME | End: 2025-01-09
Attending: EMERGENCY MEDICINE
Payer: MEDICARE

## 2025-01-09 VITALS
SYSTOLIC BLOOD PRESSURE: 157 MMHG | HEIGHT: 62 IN | RESPIRATION RATE: 13 BRPM | DIASTOLIC BLOOD PRESSURE: 77 MMHG | TEMPERATURE: 97.3 F | OXYGEN SATURATION: 97 % | HEART RATE: 61 BPM | WEIGHT: 222 LBS | BODY MASS INDEX: 40.85 KG/M2

## 2025-01-09 DIAGNOSIS — R51.9 ACUTE NONINTRACTABLE HEADACHE, UNSPECIFIED HEADACHE TYPE: Primary | ICD-10-CM

## 2025-01-09 DIAGNOSIS — I10 HYPERTENSION, UNSPECIFIED TYPE: Primary | ICD-10-CM

## 2025-01-09 LAB
ANION GAP SERPL CALCULATED.3IONS-SCNC: 14 MMOL/L (ref 9–16)
BASOPHILS # BLD: 0.12 K/UL (ref 0–0.2)
BASOPHILS NFR BLD: 1 % (ref 0–2)
BUN SERPL-MCNC: 21 MG/DL (ref 8–23)
CALCIUM SERPL-MCNC: 9.8 MG/DL (ref 8.6–10.4)
CHLORIDE SERPL-SCNC: 104 MMOL/L (ref 98–107)
CK SERPL-CCNC: 51 U/L (ref 26–192)
CO2 SERPL-SCNC: 21 MMOL/L (ref 20–31)
CREAT SERPL-MCNC: 0.8 MG/DL (ref 0.6–0.9)
EOSINOPHIL # BLD: 0.35 K/UL (ref 0–0.44)
EOSINOPHILS RELATIVE PERCENT: 3 % (ref 1–4)
ERYTHROCYTE [DISTWIDTH] IN BLOOD BY AUTOMATED COUNT: 13.6 % (ref 11.8–14.4)
GFR, ESTIMATED: 73 ML/MIN/1.73M2
GLUCOSE SERPL-MCNC: 100 MG/DL (ref 74–99)
HCT VFR BLD AUTO: 43 % (ref 36.3–47.1)
HGB BLD-MCNC: 14 G/DL (ref 11.9–15.1)
IMM GRANULOCYTES # BLD AUTO: 0.12 K/UL (ref 0–0.3)
IMM GRANULOCYTES NFR BLD: 1 %
INR PPP: 0.9
LYMPHOCYTES NFR BLD: 3.25 K/UL (ref 1.1–3.7)
LYMPHOCYTES RELATIVE PERCENT: 28 % (ref 24–43)
MCH RBC QN AUTO: 29.7 PG (ref 25.2–33.5)
MCHC RBC AUTO-ENTMCNC: 32.6 G/DL (ref 28.4–34.8)
MCV RBC AUTO: 91.1 FL (ref 82.6–102.9)
MONOCYTES NFR BLD: 1.74 K/UL (ref 0.1–1.2)
MONOCYTES NFR BLD: 15 % (ref 3–12)
MORPHOLOGY: NORMAL
MYOGLOBIN SERPL-MCNC: 28 NG/ML (ref 25–58)
NEUTROPHILS NFR BLD: 52 % (ref 36–65)
NEUTS SEG NFR BLD: 6.02 K/UL (ref 1.5–8.1)
NRBC BLD-RTO: 0 PER 100 WBC
PARTIAL THROMBOPLASTIN TIME: 28 SEC (ref 23–36.5)
PLATELET # BLD AUTO: 311 K/UL (ref 138–453)
PMV BLD AUTO: 10 FL (ref 8.1–13.5)
POTASSIUM SERPL-SCNC: 4.5 MMOL/L (ref 3.7–5.3)
PROTHROMBIN TIME: 12.3 SEC (ref 11.7–14.9)
RBC # BLD AUTO: 4.72 M/UL (ref 3.95–5.11)
SODIUM SERPL-SCNC: 139 MMOL/L (ref 136–145)
TROPONIN I SERPL HS-MCNC: 7 NG/L (ref 0–14)
WBC OTHER # BLD: 11.6 K/UL (ref 3.5–11.3)

## 2025-01-09 PROCEDURE — 6360000002 HC RX W HCPCS: Performed by: EMERGENCY MEDICINE

## 2025-01-09 PROCEDURE — 82553 CREATINE MB FRACTION: CPT

## 2025-01-09 PROCEDURE — 70450 CT HEAD/BRAIN W/O DYE: CPT

## 2025-01-09 PROCEDURE — 80048 BASIC METABOLIC PNL TOTAL CA: CPT

## 2025-01-09 PROCEDURE — 6360000004 HC RX CONTRAST MEDICATION: Performed by: EMERGENCY MEDICINE

## 2025-01-09 PROCEDURE — 99285 EMERGENCY DEPT VISIT HI MDM: CPT | Performed by: PSYCHIATRY & NEUROLOGY

## 2025-01-09 PROCEDURE — 96374 THER/PROPH/DIAG INJ IV PUSH: CPT

## 2025-01-09 PROCEDURE — 82550 ASSAY OF CK (CPK): CPT

## 2025-01-09 PROCEDURE — 85730 THROMBOPLASTIN TIME PARTIAL: CPT

## 2025-01-09 PROCEDURE — 85025 COMPLETE CBC W/AUTO DIFF WBC: CPT

## 2025-01-09 PROCEDURE — 84484 ASSAY OF TROPONIN QUANT: CPT

## 2025-01-09 PROCEDURE — 2500000003 HC RX 250 WO HCPCS: Performed by: EMERGENCY MEDICINE

## 2025-01-09 PROCEDURE — 83874 ASSAY OF MYOGLOBIN: CPT

## 2025-01-09 PROCEDURE — 6370000000 HC RX 637 (ALT 250 FOR IP): Performed by: EMERGENCY MEDICINE

## 2025-01-09 PROCEDURE — 70496 CT ANGIOGRAPHY HEAD: CPT

## 2025-01-09 PROCEDURE — 85610 PROTHROMBIN TIME: CPT

## 2025-01-09 PROCEDURE — 99285 EMERGENCY DEPT VISIT HI MDM: CPT

## 2025-01-09 PROCEDURE — 93005 ELECTROCARDIOGRAM TRACING: CPT | Performed by: EMERGENCY MEDICINE

## 2025-01-09 RX ORDER — ACETAMINOPHEN 500 MG
1000 TABLET ORAL ONCE
Status: COMPLETED | OUTPATIENT
Start: 2025-01-09 | End: 2025-01-09

## 2025-01-09 RX ORDER — IOPAMIDOL 755 MG/ML
90 INJECTION, SOLUTION INTRAVASCULAR
Status: COMPLETED | OUTPATIENT
Start: 2025-01-09 | End: 2025-01-09

## 2025-01-09 RX ORDER — ONDANSETRON 2 MG/ML
4 INJECTION INTRAMUSCULAR; INTRAVENOUS ONCE
Status: COMPLETED | OUTPATIENT
Start: 2025-01-09 | End: 2025-01-09

## 2025-01-09 RX ADMIN — IOPAMIDOL 90 ML: 755 INJECTION, SOLUTION INTRAVENOUS at 08:35

## 2025-01-09 RX ADMIN — ACETAMINOPHEN 1000 MG: 500 TABLET, FILM COATED ORAL at 08:05

## 2025-01-09 RX ADMIN — PHENYLEPHRINE HYDROCHLORIDE 1 SPRAY: 0.5 SPRAY NASAL at 10:41

## 2025-01-09 RX ADMIN — ONDANSETRON 4 MG: 2 INJECTION INTRAMUSCULAR; INTRAVENOUS at 08:04

## 2025-01-09 ASSESSMENT — PAIN DESCRIPTION - LOCATION: LOCATION: HEAD

## 2025-01-09 ASSESSMENT — LIFESTYLE VARIABLES: HOW OFTEN DO YOU HAVE A DRINK CONTAINING ALCOHOL: NEVER

## 2025-01-09 ASSESSMENT — PAIN DESCRIPTION - ORIENTATION: ORIENTATION: LEFT

## 2025-01-09 ASSESSMENT — PAIN - FUNCTIONAL ASSESSMENT: PAIN_FUNCTIONAL_ASSESSMENT: 0-10

## 2025-01-09 ASSESSMENT — ENCOUNTER SYMPTOMS
ABDOMINAL PAIN: 0
SHORTNESS OF BREATH: 0

## 2025-01-09 ASSESSMENT — PAIN SCALES - GENERAL: PAINLEVEL_OUTOF10: 6

## 2025-01-09 NOTE — ED PROVIDER NOTES
[facility] EMERGENCY DEPARTMENT  Emergency Department Encounter  Emergency Medicine Resident     Pt Name:Tracey Alarcon  MRN: [id number]  Birthdate 1940  Date of evaluation: 1/9/25  PCP:  Aura Rosado MD  Note Started: 7:46 AM EST      CHIEF COMPLAINT       Chief Complaint   Patient presents with    Headache     Hx of aneurysm        HISTORY OF PRESENT ILLNESS  (Location/Symptom, Timing/Onset, Context/Setting, Quality, Duration, Modifying Factors, Severity.)      Tracey Alarcon is a 84 y.o. female who presents with headache she noticed when she woke up this morning.  Patient states she does not typically have headaches.  Headache is on the left side of her head and face.  She denies any vision changes.  No numbness or weakness in her extremities.  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  She was discharged on aspirin and Plavix.  She has not taken any of her medication prior to arrival this morning.    PAST MEDICAL / SURGICAL / SOCIAL / FAMILY HISTORY      has a past medical history of Arthritis, Borderline diabetic, CAD (coronary artery disease), Class 3 severe obesity without serious comorbidity with body mass index (BMI) of 45.0 to 49.9 in adult, Constipation, GERD (gastroesophageal reflux disease), Hyperlipidemia, Hypertension, Incisional hernia, Irregular heart beat, Mass of soft palate, Prolonged emergence from general anesthesia, Seasonal allergies, Short of breath on exertion, Sleep apnea, SOB (shortness of breath), TIA (transient ischemic attack), Under care of team, Wears glasses, and Wellness examination.       has a past surgical history that includes Knee arthroscopy (Left); Neck surgery (2001); Rotator cuff repair (Right); Cholecystectomy; Carpal tunnel release (Bilateral); Hysterectomy (1979); ventral hernia repair (N/A, 04/05/2019); Eye surgery (Right); Colonoscopy (N/A, 07/20/2021); and other surgical  Violence: Not At Risk (3/22/2021)    Humiliation, Afraid, Rape, and Kick questionnaire     Fear of Current or Ex-Partner: No     Emotionally Abused: No     Physically Abused: No     Sexually Abused: No   Housing Stability: Low Risk  (5/1/2024)    Housing Stability Vital Sign     Unable to Pay for Housing in the Last Year: No     Number of Places Lived in the Last Year: 1     Unstable Housing in the Last Year: No       Family History   Problem Relation Age of Onset    Heart Disease Mother     Diabetes Mother     Heart Failure Mother     Heart Attack Father        Allergies:  Medrol [methylprednisolone]    Home Medications:  Prior to Admission medications    Medication Sig Start Date End Date Taking? Authorizing Provider   aspirin 81 MG EC tablet Take 1 tablet by mouth daily 1/7/25   Mata Martell APRN - CNP   clopidogrel (PLAVIX) 75 MG tablet Take 1 tablet by mouth daily 1/7/25   Maat Martell APRN - CNP   carvedilol (COREG) 6.25 MG tablet Take 1 tablet by mouth 2 times daily 11/29/24 5/28/25  Aura Rosado MD   ZINC PO Take 1 tablet by mouth daily    Nelson Olivas MD   amLODIPine (NORVASC) 5 MG tablet Take 1 tablet by mouth nightly 11/4/24 5/3/25  Aura Rosado MD   isosorbide mononitrate (IMDUR) 60 MG extended release tablet TAKE 1 TABLET BY MOUTH EVERY DAY 9/23/24   Aura Rosado MD   simvastatin (ZOCOR) 40 MG tablet TAKE 1 TABLET BY MOUTH EVERY DAY  Patient taking differently: Take 1 tablet by mouth nightly TAKE 1 TABLET BY MOUTH EVERY DAY 9/23/24   Aura Rosado MD   vitamin D (CHOLECALCIFEROL) 25 MCG (1000 UT) TABS tablet Take 1 tablet by mouth daily    Nelson Olivas MD   nitroGLYCERIN (NITROSTAT) 0.4 MG SL tablet Place 1 tablet under the tongue every 5 minutes as needed for Chest pain up to max of 3 total doses. If no relief after 1 dose, call 911.    Nelson Olivas MD   esomeprazole (NEXIUM 24HR) 20 MG delayed release capsule Take 1 capsule by mouth every

## 2025-01-09 NOTE — PROGRESS NOTES
Remote Patient Monitoring Treatment Plan    Received request from [facility]/CTNessa Reyes RN   to order remote patient monitoring for in home monitoring of HTN; Condition managed by PCP.  and order completed.     Patient will be monitoring activity  blood pressure   pulse ox   disease specific education modules .      Patient will engage in Remote Patient Monitoring each day to develop the skills necessary for self management.       RPM Care Team Responsibilities:   Alerts will be reviewed daily and addressed within 2-4 hours during operational hours (Monday -Friday 9 am-4 pm)  Alert response and intervention documented in patient medical record  Alert response escalated to PCP per protocol and documented in patient medical record  Patient monitored over approximately  days  Discharge from program based on self-management readiness    See care coordination encounters for additional details.

## 2025-01-09 NOTE — CONSULTS
Endovascular Neurosurgery Consult      Reason for evaluation: Headache post ICA stenting    SUBJECTIVE:   History of Chief Complaint:      84-year-old female presenting with a new onset headache that she describes initially as severe and left-sided, pulsating and was initially 7/10 in severity but subsided by the time she was in the hospital.  2 days ago she had left ICA cavernous aneurysm flow diversion. Patient is currently on DAPT with aspirin and Plavix, and repeat CT head and CTA showed a patent flow diverter and no sign of bleed.    CT HEAD WO CONTRAST   Final Result   No acute intracranial abnormality.         CTA HEAD NECK W CONTRAST   Final Result   No evidence for significant stenosis or large vessel occlusion.      Stent in the left internal carotid artery that is patent.  Flow remains   visualized in the aneurysm arising from the proximal cavernous portion of the   left internal carotid artery.             Allergies  is allergic to medrol [methylprednisolone].  Medications  Prior to Admission medications    Medication Sig Start Date End Date Taking? Authorizing Provider   aspirin 81 MG EC tablet Take 1 tablet by mouth daily 1/7/25   Mata Martell APRN - CNP   clopidogrel (PLAVIX) 75 MG tablet Take 1 tablet by mouth daily 1/7/25   Mata Martell APRN - CNP   carvedilol (COREG) 6.25 MG tablet Take 1 tablet by mouth 2 times daily 11/29/24 5/28/25  Aura Rosado MD   ZINC PO Take 1 tablet by mouth daily    Provider, MD Nelson   amLODIPine (NORVASC) 5 MG tablet Take 1 tablet by mouth nightly 11/4/24 5/3/25  Aura Rosado MD   isosorbide mononitrate (IMDUR) 60 MG extended release tablet TAKE 1 TABLET BY MOUTH EVERY DAY 9/23/24   Aura Rosado MD   simvastatin (ZOCOR) 40 MG tablet TAKE 1 TABLET BY MOUTH EVERY DAY  Patient taking differently: Take 1 tablet by mouth nightly TAKE 1 TABLET BY MOUTH EVERY DAY 9/23/24   Aura Rosado MD   vitamin D (CHOLECALCIFEROL) 25 MCG

## 2025-01-09 NOTE — DISCHARGE INSTRUCTIONS
You were seen today for headache and earache.  Considering you had recent aneurysm embolization you had CT scans of your head and neck that did not show any changes.  You were evaluated by your neuroendovascular surgeon.  We have no evidence of infection in your ears.  I recommend to follow-up with your primary care doctor within the next 48 hours.  He can continue to use Tylenol as needed for headache.    If you notice any concerning symptoms please return to the ER immediately. These can include but are not limited to: fevers, chills, shortness of breath, vomiting, weakness of the extremities, changes in your mental status, numbness, pale extremities, or chest pain.     Wound care: none    Diet: You may resume your normal diet     Activity: resume activity as tolerated.     Medications: Continue taking your home medications as previously directed. For pain You may take tylenol 1,000mg by mouth every 6 hours as needed for pain. Do not exceed 4,000mg per day. If you have liver disease don't take tylenol. You may also take ibuprofen 600mg every 6-8 hours as needed for pain. Do not exceed 2,400 mg per day. If you experience stomach pain or you have a history of kidney disease stop taking ibuprofen. You may alternate application of ice and heat 20 minutes at a time as desired.      You can use Allegra daily for decongestion as well as Afrin spray as needed.  Do not use Afrin for more than 3 days in a row as it can cause rebound congestion.    Follow up: Please follow up with your primary care doctor within the next 48 hours.

## 2025-01-09 NOTE — ED PROVIDER NOTES
[facility] EMERGENCY DEPARTMENT     Emergency Department     Faculty Attestation        I performed a history and physical examination of the patient and discussed management with the resident. I reviewed the resident’s note and agree with the documented findings and plan of care. Any areas of disagreement are noted on the chart. I was personally present for the key portions of any procedures. I have documented in the chart those procedures where I was not present during the key portions. I have reviewed the emergency nurses triage note. I agree with the chief complaint, past medical history, past surgical history, allergies, medications, social and family history as documented unless otherwise noted below.    For mid-level providers such as nurse practitioners as well as physicians assistants:    I have personally seen and evaluated the patient.    I find the patient's history and physical exam are consistent with NP/PA documentation.  I agree with the care provided, treatment rendered, disposition, & follow-up plan.     Additional findings are as noted.    Vital Signs: BP (!) 179/91   Pulse 78   Temp 97.3 °F (36.3 °C)   Resp 18   Ht 1.575 m (5' 2\")   Wt 100.7 kg (222 lb)   SpO2 98%   BMI 40.60 kg/m²   PCP:  Aura Rosado MD    Pertinent Comments:     Patient with left-sided headache that started this morning.  She recently had a diagnostic cerebral angiogram with pipeline shield flow diversion and embolization.  She is awake alert hypertensive but nonfocal neurological exam.      Critical Care  None          Rivera Lockhart MD    Attending Emergency Medicine Physician            Dewey Lockhart MD  01/09/25 0804

## 2025-01-09 NOTE — ED TRIAGE NOTES
Pt to ed c/o headache and left sided facial pain. Per pt this started over night. Pt was recently admitted to the neuro icu for an aneurysm and discharged 2 days ago. Pt states she had a procedure done to coil the aneurysm. Pt denies vision changes. Pt state she does not typically get headaches. Pt states the left side of her face feels \"different\" and is painful. Pt states she has felt weaker since yesterday. Pt denies taking anything for the pain.     Pt is alert and oriented x4. Pt in gown, on full cardiac monitor. EKG done. Iv placed, labs drawn. Call light in reach. Will continue with plan of care.

## 2025-01-09 NOTE — DISCHARGE SUMMARY
arising from the proximal cavernous portion of the left internal carotid artery.  The anterior cerebral arteries are patent.  The middle cerebral arteries are patent. POSTERIOR CIRCULATION: The vertebral arteries are patent.  The basilar artery is patent.  Posterior cerebral arteries are patent. OTHER: No dural venous sinus thrombosis on this non-dedicated study. BRAIN: No mass effect or midline shift. No extra-axial fluid collection. The gray-white differentiation is maintained.     No evidence for significant stenosis or large vessel occlusion. Stent in the left internal carotid artery that is patent.  Flow remains visualized in the aneurysm arising from the proximal cavernous portion of the left internal carotid artery.     CT HEAD WO CONTRAST    Result Date: 1/9/2025  EXAMINATION: CT OF THE HEAD WITHOUT CONTRAST  1/9/2025 8:29 am TECHNIQUE: CT of the head was performed without the administration of intravenous contrast. Automated exposure control, iterative reconstruction, and/or weight based adjustment of the mA/kV was utilized to reduce the radiation dose to as low as reasonably achievable. COMPARISON: 04/15/2024 HISTORY: ORDERING SYSTEM PROVIDED HISTORY: headache post L ICA aneurysm embolization TECHNOLOGIST PROVIDED HISTORY: headache post L ICA aneurysm embolization Decision Support Exception - unselect if not a suspected or confirmed emergency medical condition->Emergency Medical Condition (MA) FINDINGS: BRAIN/VENTRICLES: Left ICA stent has been placed in the interval.  There is no acute intracranial hemorrhage, mass effect or midline shift. No abnormal extra-axial fluid collection.  The gray-white differentiation is maintained without evidence of an acute infarct. There is prominence of the ventricles and sulci due to global parenchymal volume loss.  There are nonspecific areas of hypoattenuation within the periventricular and subcortical white matter, which likely represent chronic microvascular ischemic

## 2025-01-10 ENCOUNTER — CARE COORDINATION (OUTPATIENT)
Dept: CARE COORDINATION | Age: 85
End: 2025-01-10

## 2025-01-10 ENCOUNTER — CARE COORDINATION (OUTPATIENT)
Dept: PRIMARY CARE CLINIC | Age: 85
End: 2025-01-10

## 2025-01-10 ENCOUNTER — OFFICE VISIT (OUTPATIENT)
Dept: INTERNAL MEDICINE CLINIC | Age: 85
End: 2025-01-10

## 2025-01-10 VITALS
DIASTOLIC BLOOD PRESSURE: 68 MMHG | OXYGEN SATURATION: 98 % | WEIGHT: 223 LBS | HEIGHT: 62 IN | HEART RATE: 66 BPM | BODY MASS INDEX: 41.04 KG/M2 | SYSTOLIC BLOOD PRESSURE: 122 MMHG

## 2025-01-10 DIAGNOSIS — I25.10 CORONARY ARTERY DISEASE INVOLVING NATIVE CORONARY ARTERY OF NATIVE HEART WITHOUT ANGINA PECTORIS: ICD-10-CM

## 2025-01-10 DIAGNOSIS — F01.50 VASCULAR DEMENTIA WITHOUT BEHAVIORAL DISTURBANCE, PSYCHOTIC DISTURBANCE, MOOD DISTURBANCE, OR ANXIETY, UNSPECIFIED DEMENTIA SEVERITY (HCC): ICD-10-CM

## 2025-01-10 DIAGNOSIS — T78.40XD ALLERGY, SUBSEQUENT ENCOUNTER: Primary | ICD-10-CM

## 2025-01-10 DIAGNOSIS — Z09 HOSPITAL DISCHARGE FOLLOW-UP: ICD-10-CM

## 2025-01-10 DIAGNOSIS — I10 PRIMARY HYPERTENSION: ICD-10-CM

## 2025-01-10 DIAGNOSIS — I67.1 LEFT CAVERNOUS CAROTID ANEURYSM: ICD-10-CM

## 2025-01-10 PROBLEM — J40 BRONCHITIS: Status: RESOLVED | Noted: 2023-12-12 | Resolved: 2025-01-10

## 2025-01-10 LAB
EKG ATRIAL RATE: 73 BPM
EKG P AXIS: 6 DEGREES
EKG P-R INTERVAL: 184 MS
EKG Q-T INTERVAL: 394 MS
EKG QRS DURATION: 94 MS
EKG QTC CALCULATION (BAZETT): 434 MS
EKG R AXIS: -31 DEGREES
EKG T AXIS: 29 DEGREES
EKG VENTRICULAR RATE: 73 BPM

## 2025-01-10 RX ORDER — FEXOFENADINE HCL 180 MG/1
180 TABLET ORAL DAILY PRN
Qty: 90 TABLET | Refills: 2 | Status: SHIPPED | OUTPATIENT
Start: 2025-01-10

## 2025-01-10 SDOH — ECONOMIC STABILITY: FOOD INSECURITY: WITHIN THE PAST 12 MONTHS, YOU WORRIED THAT YOUR FOOD WOULD RUN OUT BEFORE YOU GOT MONEY TO BUY MORE.: PATIENT DECLINED

## 2025-01-10 SDOH — ECONOMIC STABILITY: FOOD INSECURITY: WITHIN THE PAST 12 MONTHS, THE FOOD YOU BOUGHT JUST DIDN'T LAST AND YOU DIDN'T HAVE MONEY TO GET MORE.: PATIENT DECLINED

## 2025-01-10 ASSESSMENT — PATIENT HEALTH QUESTIONNAIRE - PHQ9
2. FEELING DOWN, DEPRESSED OR HOPELESS: NOT AT ALL
SUM OF ALL RESPONSES TO PHQ9 QUESTIONS 1 & 2: 0
SUM OF ALL RESPONSES TO PHQ QUESTIONS 1-9: 0
1. LITTLE INTEREST OR PLEASURE IN DOING THINGS: NOT AT ALL
SUM OF ALL RESPONSES TO PHQ QUESTIONS 1-9: 0

## 2025-01-10 ASSESSMENT — ENCOUNTER SYMPTOMS
SHORTNESS OF BREATH: 0
NAUSEA: 0
COUGH: 0
ABDOMINAL PAIN: 0
WHEEZING: 0
VOMITING: 0
BACK PAIN: 0
CONSTIPATION: 0
DIARRHEA: 0
ALLERGIC/IMMUNOLOGIC NEGATIVE: 1

## 2025-01-10 NOTE — CARE COORDINATION
Care Transitions Note    Follow Up Call     Attempted to reach patient for transitions of care follow up.  Unable to reach patient.      Outreach Attempts:   HIPAA compliant voicemail left for patient.     Care Summary Note: Attempted to reach patient for subsequent call. Left Hipaa appropriate message with contact information requesting return call to [phone]     Follow Up Appointment:   Future Appointments         Provider Specialty Dept Phone    3/7/2025 11:00 AM Aura Rosado MD Internal Medicine [phone]    4/1/2025 1:00 PM Holland Wang MD Neurology [phone]    4/4/2025 2:00 PM Aníbal Vigil MD Neurology [phone]    7/9/2025 10:30 AM Americo Braden MD Neurology [phone]            Plan for follow-up on next business day.  based on severity of symptoms and risk factors. Plan for next call:  2nd attempt sub call    Nessa Muse RN

## 2025-01-10 NOTE — PROGRESS NOTES
Post-Discharge Transitional Care  Follow Up      Tracey Alarcon   YOB: 1940    Date of Office Visit:  1/10/2025  Date of Hospital Admission: 1/9/25  Date of Hospital Discharge: 1/9/25  Risk of hospital readmission (high >=14%. Medium >=10%) :Readmission Risk Score: 11      Care management risk score Rising risk (score 2-5) and Complex Care (Scores >=6): No Risk Score On File     Non face to face  following discharge, date last encounter closed (first attempt may have been earlier): 01/08/2025    Call initiated 2 business days of discharge: Yes    ASSESSMENT/PLAN:   Allergy, subsequent encounter  -     fexofenadine (ALLEGRA) 180 MG tablet; Take 1 tablet by mouth daily as needed (allergies), Disp-90 tablet, R-2Normal  Vascular dementia without behavioral disturbance, psychotic disturbance, mood disturbance, or anxiety, unspecified dementia severity (HCC)  Hospital discharge follow-up  -     MT DISCHARGE MEDS RECONCILED W/ CURRENT OUTPATIENT MED LIST  Coronary artery disease involving native coronary artery of native heart without angina pectoris  Primary hypertension  Left cavernous carotid aneurysm  Comments:  s/p elective pipeline shield embolization 1/6/25    Discussed with the patient,  Limit salt intake to less than 2300 mg/day  150 minutes of exercise per week.  Maintain healthy weight.  Take medications as prescribed.  Monitor and log your blood pressure at home, bring during next office visit.  Upcoming appointment with neurology, NSG     Medical Decision Making: low complexity  No follow-ups on file.           Subjective:   HPI:  Follow up of Hospital problems/diagnosis(es):     Inpatient course: Discharge summary reviewed- see chart.  s/p elective pipeline shield embolization of the left cavernous ICA aneurysm 1/6/25  Yesterday headache, so she went to emergency department   CT head and cta yesterday unremarkable  Blood pressure slight high, was treated and dcd   Feeling much better   If

## 2025-01-10 NOTE — CARE COORDINATION
Remote Patient Kit Ordering Note      Date/Time:  1/10/2025 9:37 AM      [facility] placed phone call to patient/family today to notify of RPM kit order; patient/family was available; discussed the following topics below and all questions answered.    [x] [facility] confirmed patient shipping address  [x] Patient will receive package over the next 1-3 business days. Someone 21 years or older must be present to sign for UPS delivery.  [x] HRS will contact patient within 24 hours, an HRS  will call the patient directly: If the patient does not answer, HRS will follow up with the clinical team notifying them about the unsuccessful attempt to contact the patient. HRS will make three call attempts to the patient.Provide patient with [facility] Virtual install number is: [phone].  [x] The RPM Nurse will contact patient once equipment is active to welcome them to the program.                                                         [x] Hours of RPM monitoring - Monday-Friday [id number]; encourage patient to get vitals entered by Noon each day to have the alert addressed same day.  [x][facility] mailed RPM Patient flyer to patient.                      CTN made aware the RPM kit has been ordered.

## 2025-01-10 NOTE — PROGRESS NOTES
\"Have you been to the ER, urgent care clinic since your last visit?  Hospitalized since your last visit?\"    1/6-1/7 Stvz  1/9 Headache     “Have you seen or consulted any other health care providers outside our system since your last visit?”    NO

## 2025-01-13 ENCOUNTER — CARE COORDINATION (OUTPATIENT)
Dept: CARE COORDINATION | Age: 85
End: 2025-01-13

## 2025-01-13 NOTE — CARE COORDINATION
Care Transitions Note    Follow Up Call     Patient Current Location:  Home: [address]  [place] [address]    [facility] Care Coordinator contacted the patient by telephone. Verified name and  as identifiers.    Additional needs identified to be addressed with provider   No needs identified                 Method of communication with provider: none.    Care Summary Note: Writer spoke with Tracey for a follow up care transitions call. She states she is doing okay. She reports she was initially having left facial and ear pain-she reports this has improved. She states she continues to have a mild headache in the mornings but takes Tylenol which relieves the headache. She denies having any dizziness,chest pain palpitations or visual disturbances. She has not yet received her RPM kit-she states it is supposed to be delivered this afternoon. She denies having any other needs or concerns at this time.     Plan of care updates since last contact:  Review of patient management of conditions/medications:         Advance Care Planning:   Does patient have an Advance Directive: reviewed and current.    Medication Review:  No changes since last call.     Remote Patient Monitoring:  Offered patient enrollment in the Remote Patient Monitoring (RPM) program for in-home monitoring: Yes, patient enrolled; current status is awaiting kit.    Assessments:  Care Transitions Subsequent and Final Call    Subsequent and Final Calls  Do you have any ongoing symptoms?: No  Have your medications changed?: No  Do you have any questions related to your medications?: No  Do you currently have any active services?: No  Do you have any needs or concerns that I can assist you with?: No  Identified Barriers: None  Care Transitions Interventions    Specialty Service Referral: Completed    Other Interventions:              Follow Up Appointment:   ANTONIO appointment attended as scheduled   Future Appointments         Provider Specialty Dept Phone

## 2025-01-14 DIAGNOSIS — I25.10 CORONARY ARTERY DISEASE INVOLVING NATIVE CORONARY ARTERY OF NATIVE HEART WITHOUT ANGINA PECTORIS: ICD-10-CM

## 2025-01-14 RX ORDER — ISOSORBIDE MONONITRATE 60 MG/1
TABLET, EXTENDED RELEASE ORAL
Qty: 90 TABLET | Refills: 0 | Status: SHIPPED | OUTPATIENT
Start: 2025-01-14

## 2025-01-15 PROBLEM — R51.9 ACUTE NONINTRACTABLE HEADACHE: Status: ACTIVE | Noted: 2025-01-15

## 2025-01-17 ENCOUNTER — CARE COORDINATION (OUTPATIENT)
Dept: CARE COORDINATION | Age: 85
End: 2025-01-17

## 2025-01-17 NOTE — CARE COORDINATION
Care Transitions Note    Follow Up Call     Patient Current Location:  Home: [address]  [place] [address]    Care Transition Nurse contacted the patient by telephone. Verified name and  as identifiers.    Additional needs identified to be addressed with provider   No needs identified                 Method of communication with provider: none.    Care Summary Note: Writer spoke to patient, she is doing better, no ha, ear ache of jaw ache anymore, her daughter had just got to her house and was just getting ready to call to get the RPM equipment , no new needs at this time, will continue to follow//JU    Plan of care updates since last contact:  Review of patient management of conditions/medications:         Advance Care Planning:   Does patient have an Advance Directive: reviewed and current.    Medication Review:  No changes since last call.     Remote Patient Monitoring:  Offered patient enrollment in the Remote Patient Monitoring (RPM) program for in-home monitoring: Yes, patient enrolled; current status is preactivated  .    Assessments:   Goals Addressed    None          Follow Up Appointment:   Reviewed upcoming appointment(s).  Future Appointments         Provider Specialty Dept Phone    3/7/2025 11:00 AM Aura Rosado MD Internal Medicine [phone]    2025 1:00 PM Holland Wang MD Neurology [phone]    2025 2:00 PM Aníbal Vigil MD Neurology [phone]    2025 10:30 AM Americo Braden MD Neurology [phone]            Care Transition Nurse provided contact information.  Plan for follow-up call in 2-5 days based on severity of symptoms and risk factors.  Plan for next call: symptom management-check BP   referrals-check on RPM       Nessa Muse RN

## 2025-01-20 ENCOUNTER — CARE COORDINATION (OUTPATIENT)
Dept: CASE MANAGEMENT | Age: 85
End: 2025-01-20

## 2025-01-20 NOTE — CARE COORDINATION
Remote Patient Monitoring Welcome Note  Date/Time:  2025 2:02 PM  Patient Current Location: Home: [address]  [place] [address]  Verified patients name and  as identifiers.       Completed and confirmed the following:    [x] Patient received all RPM equipment (tablet, scale, blood pressure device and cuff, and pulse oximeter)  Cuff Size: regular (9.05\"-15.74\")    Weight Scale:  regular (<330lbs)                    [x] Instructed patient keep box for use when returning equipment                                                          [x] Reviewed Patient Welcome Letter with patient    [x]  Reviewed Consent Form  Copy of consent form in chart.                 [x] Reviewed expectations for patient and care team  Monitoring hours M-F 9-4pm  It is important to take your vitals every day, even on the weekends,to keep your care team aware of how you are doing every day of the week.  Completing monitoring by 12pm on  so that alerts can be responded to in the same day  Patient weighs self at same time every day (or after urinating and waking up)  Take blood pressure 1-2 hrs after medications   RPM team may have different phone area code (including [place], [place], [place] or [place])                              [x] Instructed patient to keep scale on flat surface                                                         [x] Instructed patient to keep tablet plugged in at all times                         [x] Instructed how to contact IT support  ([phone])  [x] Provided Remote Patient Monitoring care  information     Emergency Contact Verified:  Demario Alarcon (Legal Guardian)[phone]                All questions answered at this time.

## 2025-01-21 ENCOUNTER — CARE COORDINATION (OUTPATIENT)
Dept: CASE MANAGEMENT | Age: 85
End: 2025-01-21

## 2025-01-21 NOTE — CARE COORDINATION
-Remote Alert Monitoring Note      Date/Time:  2025 10:26 AM  Patient Current Location: Home: [address] [facility] [address]  Verified patients name and  as identifiers.    Rpm alert to be reviewed by the provider   red alert  pulse ox reading (90)  Vitals Recheck pulse ox reading (92)  Additional needs to be addressed by provider: No                   LPN contacted patient by telephone regarding red alert received   Background: HTN  Refer to 911 immediately if:  Patient unresponsive or unable to provide history  Change in cognition or sudden confusion  Patient unable to respond in complete sentences  Intense chest pain/tightness  Any concern for any clinical emergency  Red Alert: Provider response time of 1 hr required for any red alert requiring intervention  Yellow Alert: Provider response time of 3hr required for any escalated yellow alert  Patient Chief Complaint:  Oxygen O2 Triage  Are you having any Chest Pain? no   Are you having any Shortness of Breath? no   Swelling in your hands or feet? no   Are you having any other health concerns or issues? no  .............................................................................................................................................................................................  Do you use oxygen? No   Patient educated on oxygen preparedness in case of an emergency?  No     Patient/Caregiver educated on how to how to properly place pulse oximeter. Patient/Caregiver verbalizes understanding.     Clinical Interventions: Reviewed and followed up on alerts and treatments-Spoke to patient she denies chest pain, SOB, she states she does have cold fingers this am, we spent some time warming her fingers up and repeat pulse ox now WNL she is speaking in full clear sentences and denies concerns    Plan/Follow Up: Will continue to review, monitor and address alerts with follow up based on severity of symptoms and risk factors.  **For any

## 2025-01-24 ENCOUNTER — CARE COORDINATION (OUTPATIENT)
Dept: CARE COORDINATION | Age: 85
End: 2025-01-24

## 2025-01-24 NOTE — CARE COORDINATION
Care Transitions Note    Follow Up Call     Patient Current Location:  Home: [address]  [place] [address]    [facility] Care Coordinator contacted the patient by telephone. Verified name and  as identifiers.    Additional needs identified to be addressed with provider   No needs identified                 Method of communication with provider: none.    Care Summary Note: Writer spoke with Tracey for a follow up care transitions call. RPM metrics for today reviewed and are WNL. She states she occasionally has difficulty getting the BP cuff on by herself and she is worried it may read incorrectly due to this. Writer reviewed correct placement/how to place and that if she ever gets an abnormal reading to rest and recheck in 5-10 minutes. She states she is no longer having any ear/jaw pain. She does still have an occasional headache nut states they are not bad enough for her to take any Tylenol for. She states her glasses are 5 years old and she feels this is part of the reason. She will be calling her eye Dr to get an appt to check her vision. She denies having any other needs or concerns and is agreeable to a follow up call next week.     Plan of care updates since last contact:  Review of patient management of conditions/medications:         Advance Care Planning:   Does patient have an Advance Directive: reviewed and current.    Medication Review:  No changes since last call.     Remote Patient Monitoring:  Offered patient enrollment in the Remote Patient Monitoring (RPM) program for in-home monitoring: Yes, patient enrolled; current status is activated and monitoring.    Assessments:  Care Transitions Subsequent and Final Call    Subsequent and Final Calls  Do you have any ongoing symptoms?: No  Have your medications changed?: No  Do you have any questions related to your medications?: No  Do you currently have any active services?: No  Do you have any needs or concerns that I can assist you with?:

## 2025-01-27 ENCOUNTER — CARE COORDINATION (OUTPATIENT)
Dept: CASE MANAGEMENT | Age: 85
End: 2025-01-27

## 2025-01-27 NOTE — CARE COORDINATION
-Remote Alert Monitoring Note      Date/Time:  2025 9:55 AM  Patient Current Location: Home: [address] [address]  Verified patients name and  as identifiers.    Rpm alert to be reviewed by the provider   red alert  pulse ox reading (91)  Vitals Recheck pulse ox reading (97)  Additional needs to be addressed by provider: No                   LPN contacted patient by telephone regarding red alert received   Background: HTN  Refer to 911 immediately if:  Patient unresponsive or unable to provide history  Change in cognition or sudden confusion  Patient unable to respond in complete sentences  Intense chest pain/tightness  Any concern for any clinical emergency  Red Alert: Provider response time of 1 hr required for any red alert requiring intervention  Yellow Alert: Provider response time of 3hr required for any escalated yellow alert  Patient Chief Complaint:  Oxygen O2 Triage  Are you having any Chest Pain? no   Are you having any Shortness of Breath? no   Swelling in your hands or feet? no   Are you having any other health concerns or issues? no  .............................................................................................................................................................................................  Do you use oxygen? No   Patient educated on oxygen preparedness in case of an emergency?  No     Patient/Caregiver educated on how to how to properly place pulse oximeter. Patient/Caregiver verbalizes understanding.     Clinical Interventions: Reviewed and followed up on alerts and treatments-spoke to patient she denies chest pain SOB .she states her fingers are cold I had her remove device and warm up fingers new reading is 97 % no concerns at present time    Plan/Follow Up: Will continue to review, monitor and address alerts with follow up based on severity of symptoms and risk factors.  **For any new or worsening symptoms or you are concerned in anyway,

## 2025-01-31 ENCOUNTER — CARE COORDINATION (OUTPATIENT)
Dept: CARE COORDINATION | Age: 85
End: 2025-01-31

## 2025-01-31 VITALS — OXYGEN SATURATION: 95 % | SYSTOLIC BLOOD PRESSURE: 137 MMHG | HEART RATE: 77 BPM | DIASTOLIC BLOOD PRESSURE: 83 MMHG

## 2025-01-31 DIAGNOSIS — E78.2 MIXED HYPERLIPIDEMIA: ICD-10-CM

## 2025-01-31 DIAGNOSIS — I67.1 ANEURYSM OF INTRACRANIAL PORTION OF LEFT INTERNAL CAROTID ARTERY: ICD-10-CM

## 2025-01-31 DIAGNOSIS — I25.10 CORONARY ARTERY DISEASE INVOLVING NATIVE CORONARY ARTERY OF NATIVE HEART WITHOUT ANGINA PECTORIS: ICD-10-CM

## 2025-01-31 DIAGNOSIS — I10 ESSENTIAL HYPERTENSION: ICD-10-CM

## 2025-01-31 DIAGNOSIS — T78.40XD ALLERGY, SUBSEQUENT ENCOUNTER: ICD-10-CM

## 2025-01-31 RX ORDER — ASPIRIN 81 MG/1
81 TABLET ORAL DAILY
Qty: 10 TABLET | Refills: 0 | Status: SHIPPED | OUTPATIENT
Start: 2025-01-31

## 2025-01-31 RX ORDER — CLOPIDOGREL BISULFATE 75 MG/1
75 TABLET ORAL DAILY
Qty: 10 TABLET | Refills: 0 | Status: SHIPPED | OUTPATIENT
Start: 2025-01-31

## 2025-01-31 RX ORDER — VITAMIN B COMPLEX
1 CAPSULE ORAL DAILY
Qty: 10 CAPSULE | Refills: 0 | Status: SHIPPED | OUTPATIENT
Start: 2025-01-31

## 2025-01-31 RX ORDER — AMLODIPINE BESYLATE 5 MG/1
5 TABLET ORAL NIGHTLY
Qty: 10 TABLET | Refills: 0 | Status: SHIPPED | OUTPATIENT
Start: 2025-01-31 | End: 2025-02-10

## 2025-01-31 RX ORDER — SIMVASTATIN 40 MG
TABLET ORAL
Qty: 10 TABLET | Refills: 0 | Status: SHIPPED | OUTPATIENT
Start: 2025-01-31

## 2025-01-31 RX ORDER — NITROGLYCERIN 0.4 MG/1
0.4 TABLET SUBLINGUAL EVERY 5 MIN PRN
Qty: 10 TABLET | Refills: 0 | Status: SHIPPED | OUTPATIENT
Start: 2025-01-31

## 2025-01-31 RX ORDER — FEXOFENADINE HCL 180 MG/1
180 TABLET ORAL DAILY PRN
Qty: 10 TABLET | Refills: 2 | Status: SHIPPED | OUTPATIENT
Start: 2025-01-31

## 2025-01-31 RX ORDER — ISOSORBIDE MONONITRATE 60 MG/1
TABLET, EXTENDED RELEASE ORAL
Qty: 10 TABLET | Refills: 0 | Status: SHIPPED | OUTPATIENT
Start: 2025-01-31

## 2025-01-31 RX ORDER — CARVEDILOL 6.25 MG/1
6.25 TABLET ORAL 2 TIMES DAILY
Qty: 10 TABLET | Refills: 0 | Status: SHIPPED | OUTPATIENT
Start: 2025-01-31 | End: 2025-07-30

## 2025-01-31 NOTE — CARE COORDINATION
Care Transitions Note    Follow Up Call     Attempted to reach patient for transitions of care follow up.  Unable to reach patient.      Outreach Attempts: #1   HIPAA compliant voicemail left for patient.     Care Summary Note: 1st attempt     Follow Up Appointment:   Future Appointments         Provider Specialty Dept Phone    3/7/2025 11:00 AM Aura Rosado MD Internal Medicine [phone]    4/1/2025 1:00 PM Holland Wang MD Neurology [phone]    4/4/2025 2:00 PM Aníbal Vigil MD Neurology [phone]    7/9/2025 10:30 AM Americo Braden MD Neurology [phone]            Plan for follow-up call in 2-5 days based on severity of symptoms and risk factors. Plan for next call: symptom management-cardiac rpm ACM Moni how is headache any new or worsening symptoms?     Ginny Tolbert LPN

## 2025-01-31 NOTE — TELEPHONE ENCOUNTER
Patients daughter called in regards to mother being out of town longer then expected visiting her ill sister.Requesting a 10 day supply to be sent to [facility] where she residing.

## 2025-02-03 ENCOUNTER — CARE COORDINATION (OUTPATIENT)
Dept: CASE MANAGEMENT | Age: 85
End: 2025-02-03

## 2025-02-03 ENCOUNTER — CARE COORDINATION (OUTPATIENT)
Dept: CARE COORDINATION | Age: 85
End: 2025-02-03

## 2025-02-03 RX ORDER — SIMVASTATIN 40 MG
TABLET ORAL
Qty: 90 TABLET | Refills: 1 | OUTPATIENT
Start: 2025-02-03

## 2025-02-03 NOTE — CARE COORDINATION
Care Transitions Note    Final Call     Attempted to reach patient for transitions of care follow up.  Unable to reach patient.      Outreach Attempts:   HIPAA compliant voicemail left for patient.     Patient graduated from the Care Transitions program on 2/3/25.  Patient/family progressing towards self management. .      Handoff:   Patient enrolled in [facility] for HTN and will be monitoring blood pressure , pulse ox , and survey questions daily.   Patient has graduated from Transitions of Care program and will be transitioned to [facility], Gaby Pearce - [phone]  .   RPM team has been notified of transition in patients care.   Patient provided [facility] name and contact information for future needs.         Care Summary Note: Writer unable to reach patient 2nd attempt for transitional care follow up call. Will hand off to [facility].     Assessments:  Care Transitions Subsequent and Final Call    Subsequent and Final Calls  Care Transitions Interventions    Specialty Service Referral: Completed    Other Interventions:              Upcoming Appointments:    Future Appointments         Provider Specialty Dept Phone    3/7/2025 11:00 AM Aura Rosado MD Internal Medicine [phone]    4/1/2025 1:00 PM Holland Wang MD Neurology [phone]    4/4/2025 2:00 PM Aníbal Vigil MD Neurology [phone]    7/9/2025 10:30 AM Americo Braden MD Neurology [phone]            Ginny Tolbert LPN

## 2025-02-03 NOTE — CARE COORDINATION
Date/Time:  2/3/2025 10:16 AM  LPN attempted to reach patient by telephone regarding red alert pulse ox 91  in remote patient monitoring program. Left HIPAA compliant message requesting a return call. Will attempt to reach patient again.    
Date/Time:  2/3/2025 8:21 AM  LPN attempted to reach patient by telephone regarding red alert pulse ox 91  in remote patient monitoring program. Left HIPAA compliant message requesting a return call. Will attempt to reach patient again.    
20

## 2025-02-04 ENCOUNTER — CARE COORDINATION (OUTPATIENT)
Dept: CARE COORDINATION | Age: 85
End: 2025-02-04

## 2025-02-04 NOTE — CARE COORDINATION
ACM received RPM patient hand off from [facility] Nessa Muse .  ACM will outreach to patient in 1-2 business days to enroll in a High Risk Care Management Program.     Ambulatory Care Coordination Note     2/4/2025 11:42 AM     ACM outreach attempt by this ACM today to offer care management services. ACM was unable to reach the patient by telephone today;   No VM picked up      ACM: ANURADHA VELÁSQUEZ RN     Care Summary Note: CTN referral- has RPM.  Current Patient Metrics ---- Activity: - mins Blood Pressure: 140/86, 75bpm Pulseox: 96%, 79bpm Survey: 0/0 Note Created at: 02/04/2025 09:25 AM     PCP/Specialist follow up:   Future Appointments         Provider Specialty Dept Phone    3/7/2025 11:00 AM Aura Rosado MD Internal Medicine [phone]    4/1/2025 1:00 PM Holland Wang MD Neurology [phone]    4/4/2025 2:00 PM Aníbal Vigil MD Neurology [phone]    7/9/2025 10:30 AM Americo Braden MD Neurology [phone]            Follow Up:   Plan for next ACM outreach in approximately 1-2 days  to complete:  - outreach attempt to offer care management services  - RPM.

## 2025-02-06 ENCOUNTER — CARE COORDINATION (OUTPATIENT)
Dept: CARE COORDINATION | Age: 85
End: 2025-02-06

## 2025-02-06 NOTE — CARE COORDINATION
Ambulatory Care Coordination Note     2/6/2025 10:59 AM     ACM outreach attempt by this ACM today to offer care management services. ACM was unable to reach the patient by telephone today;   left voice message requesting a return phone call to this ACM.     ACM: ANURADHA VEÁLSQUEZ RN     Care Summary Note: CTN referral, noted recent RPM metrics: Activity: - mins Blood Pressure: 134/80, 73bpm Pulseox: 96%, 76bpm Survey: 0/0 Note Created at: 02/05/2025 09:16 AM     PCP/Specialist follow up:   Future Appointments         Provider Specialty Dept Phone    3/7/2025 11:00 AM Aura Rosado MD Internal Medicine [phone]    4/1/2025 1:00 PM Holland Wang MD Neurology [phone]    4/4/2025 2:00 PM Aníbal Vigil MD Neurology [phone]    7/9/2025 10:30 AM Americo Braden MD Neurology [phone]            Follow Up:   Plan for next ACM outreach in approximately 1 week to complete:  - disease specific assessments  - SDOH assessments  - medication review  - advance care planning  - goal progression  - education   - RPM.

## 2025-02-10 ENCOUNTER — CARE COORDINATION (OUTPATIENT)
Dept: CASE MANAGEMENT | Age: 85
End: 2025-02-10

## 2025-02-10 NOTE — CARE COORDINATION
-Remote Alert Monitoring Note      Date/Time:  2/10/2025 8:18 AM  Patient Current Location: Home: [address] [facility] [address]  Verified patients name and  as identifiers.    Rpm alert to be reviewed by the provider   red alert  pulse ox reading (91)  Vitals Recheck pulse ox reading (96)  Additional needs to be addressed by provider: No                   LPN contacted patient by telephone regarding red alert received   Background: HTN  Refer to 911 immediately if:  Patient unresponsive or unable to provide history  Change in cognition or sudden confusion  Patient unable to respond in complete sentences  Intense chest pain/tightness  Any concern for any clinical emergency  Red Alert: Provider response time of 1 hr required for any red alert requiring intervention  Yellow Alert: Provider response time of 3hr required for any escalated yellow alert  Patient Chief Complaint:  Oxygen O2 Triage  Are you having any Chest Pain? no   Are you having any Shortness of Breath? no   Swelling in your hands or feet? no   Are you having any other health concerns or issues? no  .............................................................................................................................................................................................  Do you use oxygen? No   Patient educated on oxygen preparedness in case of an emergency?  No     Patient/Caregiver educated on how to how to properly place pulse oximeter. Patient/Caregiver verbalizes understanding.     Clinical Interventions: Reviewed and followed up on alerts and treatments-Spoke to patient she denies ches tpain, SOB, dizziness states she feels well her pulse oximeter is not working right per patient recheck of pulse ox is now 96% it will not transfer to tab let after several moment it gets a [place] with a line through it on device patient will call IT , she denies chest pain, SOB, dizziness states she feels fine     Plan/Follow Up: Will

## 2025-02-12 ENCOUNTER — CARE COORDINATION (OUTPATIENT)
Dept: CASE MANAGEMENT | Age: 85
End: 2025-02-12

## 2025-02-12 NOTE — CARE COORDINATION
trouble shoot devices    Plan/Follow Up: Will continue to review, monitor and address alerts with follow up based on severity of symptoms and risk factors.  **For any new or worsening symptoms or you are concerned in anyway, please contact your Provider or report to the nearest Emergency Room.**

## 2025-02-14 ENCOUNTER — CARE COORDINATION (OUTPATIENT)
Dept: CARE COORDINATION | Age: 85
End: 2025-02-14

## 2025-02-14 NOTE — CARE COORDINATION
Ambulatory Care Coordination Note     2/14/2025 11:56 AM     ACM outreach attempt by this ACM today to offer care management services. ACM was unable to reach the patient by telephone today;   left voice message requesting a return phone call to this ACM.     ACM: ANURADHA VELÁSQUEZ RN     Care Summary Note: CTN referral has RPM.  - Current Patient Metrics ---- Activity: - mins Blood Pressure: 145/90, 64bpm Pulseox: 96%, 69bpm Survey: 0/5 Note Created at: 02/13/2025 09:59 AM     PCP/Specialist follow up:   Future Appointments         Provider Specialty Dept Phone    2/21/2025 2:00 PM Aníbal Vigil MD Neurology [phone]    3/7/2025 11:00 AM Aura Rosado MD Internal Medicine [phone]    4/1/2025 1:00 PM Holland Wang MD Neurology [phone]    7/9/2025 10:30 AM Americo Braden MD Neurology [phone]            Follow Up:   Plan for next ACM outreach in approximately 1 week to complete:  - disease specific assessments  - medication review  - goal progression  - education   - RPM.

## 2025-02-21 ENCOUNTER — CARE COORDINATION (OUTPATIENT)
Dept: CARE COORDINATION | Age: 85
End: 2025-02-21

## 2025-02-21 ENCOUNTER — OFFICE VISIT (OUTPATIENT)
Dept: NEUROLOGY | Age: 85
End: 2025-02-21
Payer: MEDICARE

## 2025-02-21 ENCOUNTER — TELEPHONE (OUTPATIENT)
Dept: NEUROLOGY | Age: 85
End: 2025-02-21

## 2025-02-21 VITALS
SYSTOLIC BLOOD PRESSURE: 132 MMHG | DIASTOLIC BLOOD PRESSURE: 78 MMHG | HEIGHT: 62 IN | BODY MASS INDEX: 40.15 KG/M2 | HEART RATE: 73 BPM | WEIGHT: 218.2 LBS

## 2025-02-21 DIAGNOSIS — I67.1 ANEURYSM OF INTRACRANIAL PORTION OF LEFT INTERNAL CAROTID ARTERY: Primary | ICD-10-CM

## 2025-02-21 PROCEDURE — 3075F SYST BP GE 130 - 139MM HG: CPT | Performed by: STUDENT IN AN ORGANIZED HEALTH CARE EDUCATION/TRAINING PROGRAM

## 2025-02-21 PROCEDURE — 1036F TOBACCO NON-USER: CPT | Performed by: STUDENT IN AN ORGANIZED HEALTH CARE EDUCATION/TRAINING PROGRAM

## 2025-02-21 PROCEDURE — 3078F DIAST BP <80 MM HG: CPT | Performed by: STUDENT IN AN ORGANIZED HEALTH CARE EDUCATION/TRAINING PROGRAM

## 2025-02-21 PROCEDURE — G8427 DOCREV CUR MEDS BY ELIG CLIN: HCPCS | Performed by: STUDENT IN AN ORGANIZED HEALTH CARE EDUCATION/TRAINING PROGRAM

## 2025-02-21 PROCEDURE — 1123F ACP DISCUSS/DSCN MKR DOCD: CPT | Performed by: STUDENT IN AN ORGANIZED HEALTH CARE EDUCATION/TRAINING PROGRAM

## 2025-02-21 PROCEDURE — G8399 PT W/DXA RESULTS DOCUMENT: HCPCS | Performed by: STUDENT IN AN ORGANIZED HEALTH CARE EDUCATION/TRAINING PROGRAM

## 2025-02-21 PROCEDURE — 99215 OFFICE O/P EST HI 40 MIN: CPT | Performed by: STUDENT IN AN ORGANIZED HEALTH CARE EDUCATION/TRAINING PROGRAM

## 2025-02-21 PROCEDURE — 1090F PRES/ABSN URINE INCON ASSESS: CPT | Performed by: STUDENT IN AN ORGANIZED HEALTH CARE EDUCATION/TRAINING PROGRAM

## 2025-02-21 PROCEDURE — G8417 CALC BMI ABV UP PARAM F/U: HCPCS | Performed by: STUDENT IN AN ORGANIZED HEALTH CARE EDUCATION/TRAINING PROGRAM

## 2025-02-21 NOTE — PROGRESS NOTES
Endovascular Neurosurgery Clinic Note    Pt Name: Tracey Alarcon  MRN: [id number]  YOB: 1940  Date of evaluation: 2/21/2025  Primary Care Physician: Aura Rosado MD      SUBJECTIVE:   Doing well and tolerating her DAPT. She is compliant with her medication. 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     She was admitted to [facility] for a workup due to fatigue and facial droop. During that workup, she had an MRI and EEG. The EEG showed no evidence of any strokes or seizures. She was discharged with further treatment for high blood pressure and on a baby aspirin     Due to persist encephalopathy after discharge she sought a second opinion from Dr. Joan Ramey in Neurology the following week, who suggested that her symptoms might be due to a urinary tract infection (UTI). After taking antibiotics three days, she regained her ability to speak and the staring spells ceased. She has a history of UTI, htn, hld     She experiences difficulty walking due to balance issues and fatigue but manages to move around her house without a cane, using furniture for support. For longer distances, she uses a rolling walker. She has been diagnosed with sleep apnea and uses a CPAP machine.     She also has a right shoulder issue, which she is managing with therapy and has decided against surgery. Additionally, she has claustrophobia for MRI imaging     She is n.p.o. and took her Plavix and aspirin last night.  Will give her her dose now and proceed with left ICA cavernous segment aneurysm embolization under general anesthesia  Allergies  is allergic to medrol [methylprednisolone].  Medications  Prior to Admission medications    Medication Sig Start Date End Date Taking? Authorizing Provider   amLODIPine (NORVASC) 5 MG tablet Take 1 tablet

## 2025-02-21 NOTE — TELEPHONE ENCOUNTER
I called and left message for patient to call and reschedule appointment with Dr. Olson to Dr. Braden. I then realized patient is already scheduled with Dr. Braden in July. We cancelled the appointment with Dr. Olson per Dr Vigil's advice.

## 2025-02-24 ENCOUNTER — CARE COORDINATION (OUTPATIENT)
Dept: CARE COORDINATION | Age: 85
End: 2025-02-24

## 2025-02-24 NOTE — CARE COORDINATION
Ambulatory Care Coordination Note     2025 11:00 AM     Patient Current Location:  Home: [address]  [place] [facility] [address]     ACM contacted the patient by telephone. Verified name and  with patient as identifiers.         ACM: GABY PEARCE RN     Challenges to be reviewed by the provider   Additional needs identified to be addressed with provider No  none               Method of communication with provider: none.    Utilization: Patient has not had any utilization since our last call.     Care Summary Note: Patient reports that she is doing well. She has all of her medications. Had a recent follow up with neurology.   Reminder of upcoming PCP appointment.  Patient is participating in RPM and asked how long this will continue. I recommended continuing it until her upcoming PCP appointment at least. We can discuss graduating at that time, she agreed.     Offered patient enrollment in the Remote Patient Monitoring (RPM) program for in-home monitoring: Yes, patient enrolled; current status is activated and monitoring.   Current Patient Metrics ---- Activity: - mins Blood Pressure: 120/67, 71bpm Pulseox: 96%, 70bpm Survey: 0/5 Note Created at: 2025 09:15 AM   Assessments Completed:   General Assessment    Do you have any symptoms that are causing concern?: No          Medications Reviewed:   Completed during this call    Advance Care Planning:   Reviewed and current     Care Planning:   Education Documentation  Types of Medications You May Be On, taught by Gaby Pearce RN at 2025 10:59 AM.  Learner: Patient  Readiness: Acceptance  Method: Explanation  Response: Verbalizes Understanding    General medication information, taught by Gaby Pearce RN at 2025 10:59 AM.  Learner: Patient  Readiness: Acceptance  Method: Explanation  Response: Verbalizes Understanding    Education Comments  No comments found.     ,    Goals Addressed                   This Visit's Progress     Conditions

## 2025-02-27 NOTE — TELEPHONE ENCOUNTER
Patient daughter called back and confirmed receipt of message, there was some confusion as to what we were canceling, that was cleared up. Patient daughter Dodie vocalized understanding.

## 2025-03-06 DIAGNOSIS — I25.10 CORONARY ARTERY DISEASE INVOLVING NATIVE CORONARY ARTERY OF NATIVE HEART WITHOUT ANGINA PECTORIS: ICD-10-CM

## 2025-03-06 DIAGNOSIS — I10 ESSENTIAL HYPERTENSION: ICD-10-CM

## 2025-03-06 NOTE — TELEPHONE ENCOUNTER
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

## 2025-03-07 ENCOUNTER — OFFICE VISIT (OUTPATIENT)
Dept: INTERNAL MEDICINE CLINIC | Age: 85
End: 2025-03-07

## 2025-03-07 VITALS
OXYGEN SATURATION: 97 % | BODY MASS INDEX: 40.85 KG/M2 | HEIGHT: 62 IN | WEIGHT: 222 LBS | HEART RATE: 65 BPM | SYSTOLIC BLOOD PRESSURE: 136 MMHG | DIASTOLIC BLOOD PRESSURE: 82 MMHG

## 2025-03-07 DIAGNOSIS — R73.03 PREDIABETES: ICD-10-CM

## 2025-03-07 DIAGNOSIS — I25.10 CORONARY ARTERY DISEASE INVOLVING NATIVE CORONARY ARTERY OF NATIVE HEART WITHOUT ANGINA PECTORIS: ICD-10-CM

## 2025-03-07 DIAGNOSIS — I67.1 ANEURYSM OF CAVERNOUS PORTION OF LEFT INTERNAL CAROTID ARTERY: ICD-10-CM

## 2025-03-07 DIAGNOSIS — I10 ESSENTIAL HYPERTENSION: Primary | ICD-10-CM

## 2025-03-07 DIAGNOSIS — E66.813 OBESITY, CLASS 3: ICD-10-CM

## 2025-03-07 RX ORDER — CARVEDILOL 12.5 MG/1
12.5 TABLET ORAL 2 TIMES DAILY
Qty: 180 TABLET | Refills: 1 | Status: SHIPPED | OUTPATIENT
Start: 2025-03-07 | End: 2025-09-03

## 2025-03-07 RX ORDER — CARVEDILOL 6.25 MG/1
6.25 TABLET ORAL 2 TIMES DAILY
Qty: 180 TABLET | Refills: 1 | OUTPATIENT
Start: 2025-03-07

## 2025-03-07 ASSESSMENT — ENCOUNTER SYMPTOMS
ALLERGIC/IMMUNOLOGIC NEGATIVE: 1
VOMITING: 0
CONSTIPATION: 0
ABDOMINAL PAIN: 0
BACK PAIN: 0
SHORTNESS OF BREATH: 0
NAUSEA: 0
WHEEZING: 0
COUGH: 0
DIARRHEA: 0

## 2025-03-07 NOTE — PROGRESS NOTES
[facility]  [address] [address]  Dept: [phone]  Dept Fax: [phone]    OFFICE VISIT NOTE  Date of patient's visit: 3/7/2025  Patient's Name:  Tracey Alarcon YOB: 1940            Patient Care Team:  Aura Rosado MD as PCP - General (Internal Medicine)  Aura Rosado MD as PCP - Empaneled Provider  Rajan Mckeon MD as Consulting Physician (Gastroenterology)  Gaby Pearce RN as Ambulatory Care Manager  _________________________________________    ________________________________________________  Chief Complaint:   Hypertension    _______________________________________________  History of Presenting Illness:  History was obtained from the patient. Tracey Alarcon is a 84 y.o. female.     HTN:  Diagnosed: many yrs ago  Medications: amlodipine and coreg; tolerating the medications well and is compliant.   Associated symptoms: no chest pain, blurry vision, headache.   Checks blood pressure at home: Yes; moderately controlled; Has BP kit at home.  Diet: Compliant; counseled on low salt diet, low fat and low cholesterol.   Exercise: No  Body mass index is 40.6 kg/m².    BP Readings from Last 3 Encounters:   03/07/25 136/82   02/21/25 132/78   03/07/25 137/80           Lab Results   Component Value Date    HGB 14.0 01/09/2025    LABA1C 6.2 (H) 04/26/2024    CHOL 136 04/17/2024    HDL 47 04/17/2024    LDL 73 04/17/2024    TRIG 82 04/17/2024    VITD25 75.0 04/26/2024    CREATININE 0.8 01/09/2025     _____________________________________________________  Past Medical/Surgical History:        Diagnosis Date    Arthritis     Borderline diabetic     controlled with diet    Bronchitis 12/12/2023    CAD (coronary artery disease)     Class 3 severe obesity without serious comorbidity with body mass index (BMI) of 45.0 to 49.9 in adult 08/08/2021    Constipation     GERD (gastroesophageal reflux disease)     Hyperlipidemia

## 2025-03-13 ENCOUNTER — TELEPHONE (OUTPATIENT)
Dept: NEUROLOGY | Age: 85
End: 2025-03-13

## 2025-03-13 NOTE — TELEPHONE ENCOUNTER
Patients daughter came in stating that her dentist would like a letter stating that she is able to have a dental surgery. She is on plavix and they are wondering if she needs to stop that in advance to having the surgery and how long. Please advise

## 2025-03-21 ENCOUNTER — CARE COORDINATION (OUTPATIENT)
Dept: CARE COORDINATION | Age: 85
End: 2025-03-21

## 2025-03-25 ENCOUNTER — CARE COORDINATION (OUTPATIENT)
Dept: CARE COORDINATION | Age: 85
End: 2025-03-25

## 2025-03-25 NOTE — CARE COORDINATION
Ambulatory Care Coordination Note     3/25/2025 3:29 PM     ACM outreach attempt by this ACM today to perform care management follow up . ACM was unable to reach the patient by telephone today;   left voice message requesting a return phone call to this ACM.     ACM: ANURADHA VELÁSQUEZ RN     Care Summary Note: noted RPM metrics:  Current Patient Metrics ---- Activity: - mins Blood Pressure: 123/82, 60bpm Pulseox: 95%, 62bpm Survey: 0/5 Note Created at: 03/25/2025 10:58 AM     PCP/Specialist follow up:   Future Appointments         Provider Specialty Dept Phone    3/27/2025 11:00 AM [facility] Radiology Special Procedures [phone]    4/1/2025 1:00 PM Holland Wang MD Neurology [phone]    6/19/2025 1:15 PM Aura Rosado MD Internal Medicine [phone]    7/9/2025 10:30 AM Americo Braden MD Neurology [phone]            Follow Up:   Plan for next ACM outreach in approximately 2 weeks to complete:  - disease specific assessments  - SDOH assessments  - medication review  - goal progression  - education   - RPM.

## 2025-03-27 ENCOUNTER — HOSPITAL ENCOUNTER (OUTPATIENT)
Dept: INTERVENTIONAL RADIOLOGY/VASCULAR | Age: 85
Discharge: HOME | End: 2025-03-29
Payer: MEDICARE

## 2025-03-27 VITALS
DIASTOLIC BLOOD PRESSURE: 72 MMHG | HEART RATE: 54 BPM | OXYGEN SATURATION: 99 % | TEMPERATURE: 97.7 F | HEIGHT: 62 IN | WEIGHT: 220 LBS | BODY MASS INDEX: 40.48 KG/M2 | RESPIRATION RATE: 16 BRPM | SYSTOLIC BLOOD PRESSURE: 162 MMHG

## 2025-03-27 DIAGNOSIS — I72.9 ANEURYSM: ICD-10-CM

## 2025-03-27 LAB
BUN BLD-MCNC: 23 MG/DL (ref 8–26)
CHLORIDE BLD-SCNC: 107 MMOL/L (ref 98–107)
EGFR, POC: 89 ML/MIN/1.73M2
GLUCOSE BLD-MCNC: 110 MG/DL (ref 74–100)
HCT VFR BLD AUTO: 43 % (ref 36–46)
POC CREATININE: 0.6 MG/DL (ref 0.51–1.19)
POC HEMOGLOBIN (CALC): 14.6 G/DL (ref 12–16)
POTASSIUM BLD-SCNC: 4.6 MMOL/L (ref 3.5–4.5)
SODIUM BLD-SCNC: 142 MMOL/L (ref 138–146)

## 2025-03-27 PROCEDURE — 99152 MOD SED SAME PHYS/QHP 5/>YRS: CPT

## 2025-03-27 PROCEDURE — 84520 ASSAY OF UREA NITROGEN: CPT

## 2025-03-27 PROCEDURE — 82435 ASSAY OF BLOOD CHLORIDE: CPT

## 2025-03-27 PROCEDURE — 85014 HEMATOCRIT: CPT

## 2025-03-27 PROCEDURE — 6360000002 HC RX W HCPCS: Performed by: STUDENT IN AN ORGANIZED HEALTH CARE EDUCATION/TRAINING PROGRAM

## 2025-03-27 PROCEDURE — 76377 3D RENDER W/INTRP POSTPROCES: CPT

## 2025-03-27 PROCEDURE — C1894 INTRO/SHEATH, NON-LASER: HCPCS

## 2025-03-27 PROCEDURE — 6360000004 HC RX CONTRAST MEDICATION: Performed by: STUDENT IN AN ORGANIZED HEALTH CARE EDUCATION/TRAINING PROGRAM

## 2025-03-27 PROCEDURE — 36223 PLACE CATH CAROTID/INOM ART: CPT

## 2025-03-27 PROCEDURE — 84132 ASSAY OF SERUM POTASSIUM: CPT

## 2025-03-27 PROCEDURE — 6370000000 HC RX 637 (ALT 250 FOR IP): Performed by: PSYCHIATRY & NEUROLOGY

## 2025-03-27 PROCEDURE — 99153 MOD SED SAME PHYS/QHP EA: CPT

## 2025-03-27 PROCEDURE — 82565 ASSAY OF CREATININE: CPT

## 2025-03-27 PROCEDURE — 82947 ASSAY GLUCOSE BLOOD QUANT: CPT

## 2025-03-27 PROCEDURE — 75710 ARTERY X-RAYS ARM/LEG: CPT

## 2025-03-27 PROCEDURE — 2500000003 HC RX 250 WO HCPCS: Performed by: STUDENT IN AN ORGANIZED HEALTH CARE EDUCATION/TRAINING PROGRAM

## 2025-03-27 PROCEDURE — 84295 ASSAY OF SERUM SODIUM: CPT

## 2025-03-27 PROCEDURE — 2580000003 HC RX 258: Performed by: PSYCHIATRY & NEUROLOGY

## 2025-03-27 RX ORDER — SODIUM CHLORIDE 0.9 % (FLUSH) 0.9 %
5-40 SYRINGE (ML) INJECTION EVERY 12 HOURS SCHEDULED
Status: DISCONTINUED | OUTPATIENT
Start: 2025-03-27 | End: 2025-03-30 | Stop reason: HOSPADM

## 2025-03-27 RX ORDER — SODIUM CHLORIDE 0.9 % (FLUSH) 0.9 %
5-40 SYRINGE (ML) INJECTION PRN
Status: DISCONTINUED | OUTPATIENT
Start: 2025-03-27 | End: 2025-03-30 | Stop reason: HOSPADM

## 2025-03-27 RX ORDER — ONDANSETRON 2 MG/ML
4 INJECTION INTRAMUSCULAR; INTRAVENOUS EVERY 6 HOURS PRN
Status: DISCONTINUED | OUTPATIENT
Start: 2025-03-27 | End: 2025-03-30 | Stop reason: HOSPADM

## 2025-03-27 RX ORDER — SODIUM CHLORIDE 9 MG/ML
INJECTION, SOLUTION INTRAVENOUS PRN
Status: DISCONTINUED | OUTPATIENT
Start: 2025-03-27 | End: 2025-03-30 | Stop reason: HOSPADM

## 2025-03-27 RX ORDER — FENTANYL CITRATE 50 UG/ML
INJECTION, SOLUTION INTRAMUSCULAR; INTRAVENOUS PRN
Status: COMPLETED | OUTPATIENT
Start: 2025-03-27 | End: 2025-03-27

## 2025-03-27 RX ORDER — VERAPAMIL HYDROCHLORIDE 2.5 MG/ML
INJECTION, SOLUTION INTRAVENOUS PRN
Status: COMPLETED | OUTPATIENT
Start: 2025-03-27 | End: 2025-03-27

## 2025-03-27 RX ORDER — SODIUM CHLORIDE 9 MG/ML
INJECTION, SOLUTION INTRAVENOUS CONTINUOUS
Status: DISCONTINUED | OUTPATIENT
Start: 2025-03-27 | End: 2025-03-30 | Stop reason: HOSPADM

## 2025-03-27 RX ORDER — MIDAZOLAM HYDROCHLORIDE 2 MG/2ML
INJECTION, SOLUTION INTRAMUSCULAR; INTRAVENOUS PRN
Status: COMPLETED | OUTPATIENT
Start: 2025-03-27 | End: 2025-03-27

## 2025-03-27 RX ORDER — ASPIRIN 81 MG/1
81 TABLET ORAL DAILY
Status: DISCONTINUED | OUTPATIENT
Start: 2025-03-27 | End: 2025-03-30 | Stop reason: HOSPADM

## 2025-03-27 RX ORDER — ONDANSETRON 4 MG/1
4 TABLET, ORALLY DISINTEGRATING ORAL EVERY 8 HOURS PRN
Status: DISCONTINUED | OUTPATIENT
Start: 2025-03-27 | End: 2025-03-30 | Stop reason: HOSPADM

## 2025-03-27 RX ORDER — ACETAMINOPHEN 325 MG/1
325 TABLET ORAL ONCE
Status: COMPLETED | OUTPATIENT
Start: 2025-03-27 | End: 2025-03-27

## 2025-03-27 RX ORDER — NITROGLYCERIN 20 MG/100ML
INJECTION INTRAVENOUS CONTINUOUS PRN
Status: COMPLETED | OUTPATIENT
Start: 2025-03-27 | End: 2025-03-27

## 2025-03-27 RX ORDER — HEPARIN SODIUM 1000 [USP'U]/ML
INJECTION, SOLUTION INTRAVENOUS; SUBCUTANEOUS PRN
Status: COMPLETED | OUTPATIENT
Start: 2025-03-27 | End: 2025-03-27

## 2025-03-27 RX ORDER — IODIXANOL 270 MG/ML
100 INJECTION, SOLUTION INTRAVASCULAR
Status: COMPLETED | OUTPATIENT
Start: 2025-03-27 | End: 2025-03-27

## 2025-03-27 RX ADMIN — MIDAZOLAM HYDROCHLORIDE 0.5 MG: 1 INJECTION, SOLUTION INTRAMUSCULAR; INTRAVENOUS at 11:19

## 2025-03-27 RX ADMIN — ACETAMINOPHEN 325 MG: 325 TABLET ORAL at 15:15

## 2025-03-27 RX ADMIN — NITROGLYCERIN 200 MCG: 20 INJECTION INTRAVENOUS at 11:25

## 2025-03-27 RX ADMIN — IODIXANOL 85 ML: 270 INJECTION, SOLUTION INTRAVASCULAR at 12:12

## 2025-03-27 RX ADMIN — HEPARIN SODIUM 1000 UNITS: 1000 INJECTION, SOLUTION INTRAVENOUS; SUBCUTANEOUS at 11:49

## 2025-03-27 RX ADMIN — VERAPAMIL HYDROCHLORIDE 2.5 MG: 2.5 INJECTION, SOLUTION INTRAVENOUS at 11:24

## 2025-03-27 RX ADMIN — FENTANYL CITRATE 50 MCG: 50 INJECTION, SOLUTION INTRAMUSCULAR; INTRAVENOUS at 11:19

## 2025-03-27 RX ADMIN — SODIUM CHLORIDE: 0.9 INJECTION, SOLUTION INTRAVENOUS at 09:45

## 2025-03-27 RX ADMIN — HEPARIN SODIUM 2000 UNITS: 1000 INJECTION, SOLUTION INTRAVENOUS; SUBCUTANEOUS at 11:24

## 2025-03-27 ASSESSMENT — PAIN - FUNCTIONAL ASSESSMENT: PAIN_FUNCTIONAL_ASSESSMENT: NONE - DENIES PAIN

## 2025-03-27 ASSESSMENT — PAIN SCALES - GENERAL
PAINLEVEL_OUTOF10: 6
PAINLEVEL_OUTOF10: 7

## 2025-03-27 ASSESSMENT — PAIN DESCRIPTION - DESCRIPTORS
DESCRIPTORS: ACHING
DESCRIPTORS: ACHING

## 2025-03-27 ASSESSMENT — PAIN DESCRIPTION - LOCATION
LOCATION: HEAD
LOCATION: HEAD

## 2025-03-27 ASSESSMENT — PAIN DESCRIPTION - ORIENTATION
ORIENTATION: MID
ORIENTATION: MID

## 2025-03-27 NOTE — OR NURSING
Pt arrives awake, alert and oriented  Speech clear and appropriate  YORK and follows commands with generalized weakness  Exts warm and pulses palp  Rt hand warm, pink and radial pulse strong and palp

## 2025-03-27 NOTE — PROGRESS NOTES
RECEIVED INTO [facility] ROOM 3 PER AMBULATION.  ASSESSMENT AND VITALS AS CHARTED. FAMILY MEMBERS IN ATTENDANCE

## 2025-03-27 NOTE — BRIEF OP NOTE
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  Complications: none    Intra-Operative EXAM:  Patient sedated with unchanged limited neurological exam    EBL: < Minimal      Cc            Specimens: Were not Obtained  Contrast:     Visipaque 270 low osmolar 85 Cc             Fluoro: 23.2 min    Findings:  Please see dictated Radiology note for further details  Previously treated left cavernous ICA aneurysm with pipeline shield flow diverter device is filling achieving Bhavin Adalberto score 3.  The distal end of the pipeline shield device has foreshortened and covering 50% of the aneurysm neck.  The stent is well apposed with no significant intimal hyperplasia        POST-PROCEDURAL EXAM :   Stable neurological Exam  Neurological exam performed and unchanged from initial H&P or consult    Closure:  right TR Band 5   F        POST-PROCEDURAL MONITORING : see orders  Disposition: Recovery room      Recommendations:  Back to Recovery room  Do not bend right leg for 3 hours.  Groin checks per protocol.  Peripheral pulse checks per protocol.  SBP goal 100-140  Follow up with Kehinde Olson MD PhD  4-8 weeks after discharge and Dr. Braden 3-4 months after discharge.        Aníbal Moyer,

## 2025-03-27 NOTE — H&P
Endovascular Neurosurgery Note    Pt Name: Tracey Alarcon  MRN: [id number]  YOB: 1940  Date of evaluation: 3/27/2025  Primary Care Physician: Aura Rosado MD      SUBJECTIVE:     Presents for scheduled elective DSA. 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 Doing well. Continues on her Aspirin and Plavix.       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     She was admitted to [facility] for a workup due to fatigue and facial droop. During that workup, she had an MRI and EEG. The EEG showed no evidence of any strokes or seizures. She was discharged with further treatment for high blood pressure and on a baby aspirin     Due to persist encephalopathy after discharge she sought a second opinion from Dr. Joan Ramey in Neurology the following week, who suggested that her symptoms might be due to a urinary tract infection (UTI). After taking antibiotics three days, she regained her ability to speak and the staring spells ceased. She has a history of UTI, htn, hld     She experiences difficulty walking due to balance issues and fatigue but manages to move around her house without a cane, using furniture for support. For longer distances, she uses a rolling walker. She has been diagnosed with sleep apnea and uses a CPAP machine.     She also has a right shoulder issue, which she is managing with therapy and has decided against surgery. Additionally, she has claustrophobia for MRI imaging     She is n.p.o. and took her Plavix and aspirin last night.  Will give her her dose now and proceed with left ICA cavernous segment aneurysm embolization under general anesthesia  Allergies  is allergic to medrol [methylprednisolone].  Medications  Prior to Admission medications    Medication Sig Start Date End Date Taking? Authorizing Provider   carvedilol (COREG) 12.5 MG tablet Take 1

## 2025-03-27 NOTE — PROGRESS NOTES
Air removed fromVasc band in  2 mL increments until all air removed. No bleeding or hematoma noted.  Pressure dressing  applied, radial pulse palpable.

## 2025-03-28 ENCOUNTER — TELEPHONE (OUTPATIENT)
Dept: NEUROLOGY | Age: 85
End: 2025-03-28

## 2025-03-28 DIAGNOSIS — I10 ESSENTIAL HYPERTENSION: ICD-10-CM

## 2025-03-28 DIAGNOSIS — I67.1 ANEURYSM OF INTRACRANIAL PORTION OF LEFT INTERNAL CAROTID ARTERY: ICD-10-CM

## 2025-03-28 RX ORDER — AMLODIPINE BESYLATE 5 MG/1
5 TABLET ORAL NIGHTLY
Qty: 90 TABLET | Refills: 1 | OUTPATIENT
Start: 2025-03-28

## 2025-03-28 RX ORDER — CLOPIDOGREL BISULFATE 75 MG/1
75 TABLET ORAL DAILY
Qty: 30 TABLET | Refills: 5 | Status: SHIPPED | OUTPATIENT
Start: 2025-03-28

## 2025-03-28 RX ORDER — CLOPIDOGREL BISULFATE 75 MG/1
75 TABLET ORAL DAILY
Qty: 30 TABLET | Refills: 2 | Status: ON HOLD | OUTPATIENT
Start: 2025-03-28 | End: 2025-04-01 | Stop reason: HOSPADM

## 2025-03-28 NOTE — TELEPHONE ENCOUNTER
Called patient to go over instruction for Monday. She states she needs a new RX for her blood thinners. Please send script to [facility] in [place] on [address]

## 2025-03-31 ENCOUNTER — HOSPITAL ENCOUNTER (INPATIENT)
Dept: INTERVENTIONAL RADIOLOGY/VASCULAR | Age: 85
LOS: 1 days | Discharge: HOME | DRG: 039 | End: 2025-04-01
Attending: PSYCHIATRY & NEUROLOGY | Admitting: PSYCHIATRY & NEUROLOGY
Payer: MEDICARE

## 2025-03-31 ENCOUNTER — ANESTHESIA EVENT (OUTPATIENT)
Dept: INTERVENTIONAL RADIOLOGY/VASCULAR | Age: 85
DRG: 039 | End: 2025-03-31
Payer: MEDICARE

## 2025-03-31 ENCOUNTER — ANESTHESIA (OUTPATIENT)
Dept: INTERVENTIONAL RADIOLOGY/VASCULAR | Age: 85
DRG: 039 | End: 2025-03-31
Payer: MEDICARE

## 2025-03-31 DIAGNOSIS — I72.9 ANEURYSM: ICD-10-CM

## 2025-03-31 PROBLEM — I67.1 BRAIN ANEURYSM: Status: ACTIVE | Noted: 2025-03-31

## 2025-03-31 LAB
ABO + RH BLD: NORMAL
ACT BLD: 137 SEC (ref 79–149)
ACT BLD: 207 SEC (ref 79–149)
ACT BLD: 243 SEC (ref 79–149)
ACT BLD: 251 SEC (ref 79–149)
ARM BAND NUMBER: NORMAL
BLOOD BANK SAMPLE EXPIRATION: NORMAL
BLOOD GROUP ANTIBODIES SERPL: NEGATIVE
BUN BLD-MCNC: 20 MG/DL (ref 8–26)
CA-I BLD-SCNC: 1.28 MMOL/L (ref 1.15–1.33)
CHLORIDE BLD-SCNC: 107 MMOL/L (ref 98–107)
CLOSURE TME COLL+ADP BLD: 102 SEC (ref 67–112)
CO2 BLD CALC-SCNC: 25 MMOL/L (ref 22–30)
COLLAGEN EPINEPHRINE TIME: 118 SEC (ref 85–172)
EGFR, POC: 89 ML/MIN/1.73M2
GLUCOSE BLD-MCNC: 104 MG/DL (ref 74–100)
HCT VFR BLD AUTO: 44 % (ref 36–46)
PLATELET FUNCTION INTERP: NORMAL
POC ANION GAP: 12 MMOL/L (ref 7–16)
POC CREATININE: 0.6 MG/DL (ref 0.51–1.19)
POC HEMOGLOBIN (CALC): 14.8 G/DL (ref 12–16)
POTASSIUM BLD-SCNC: 4.1 MMOL/L (ref 3.5–4.5)
SODIUM BLD-SCNC: 143 MMOL/L (ref 138–146)

## 2025-03-31 PROCEDURE — 82565 ASSAY OF CREATININE: CPT

## 2025-03-31 PROCEDURE — 03VL3HZ RESTRICTION OF LEFT INTERNAL CAROTID ARTERY WITH INTRALUMINAL DEVICE, FLOW DIVERTER, PERCUTANEOUS APPROACH: ICD-10-PCS | Performed by: PSYCHIATRY & NEUROLOGY

## 2025-03-31 PROCEDURE — 84520 ASSAY OF UREA NITROGEN: CPT

## 2025-03-31 PROCEDURE — 2500000003 HC RX 250 WO HCPCS: Performed by: ANESTHESIOLOGY

## 2025-03-31 PROCEDURE — 86900 BLOOD TYPING SEROLOGIC ABO: CPT

## 2025-03-31 PROCEDURE — 82947 ASSAY GLUCOSE BLOOD QUANT: CPT

## 2025-03-31 PROCEDURE — 2500000003 HC RX 250 WO HCPCS: Performed by: STUDENT IN AN ORGANIZED HEALTH CARE EDUCATION/TRAINING PROGRAM

## 2025-03-31 PROCEDURE — 75898 FOLLOW-UP ANGIOGRAPHY: CPT

## 2025-03-31 PROCEDURE — 2580000003 HC RX 258

## 2025-03-31 PROCEDURE — 6370000000 HC RX 637 (ALT 250 FOR IP): Performed by: NURSE PRACTITIONER

## 2025-03-31 PROCEDURE — 2000000000 HC ICU R&B

## 2025-03-31 PROCEDURE — B31JYZZ FLUOROSCOPY OF LEFT UPPER EXTREMITY ARTERIES USING OTHER CONTRAST: ICD-10-PCS | Performed by: PSYCHIATRY & NEUROLOGY

## 2025-03-31 PROCEDURE — B31BYZZ FLUOROSCOPY OF LEFT EXTERNAL CAROTID ARTERY USING OTHER CONTRAST: ICD-10-PCS | Performed by: PSYCHIATRY & NEUROLOGY

## 2025-03-31 PROCEDURE — B317YZZ FLUOROSCOPY OF LEFT INTERNAL CAROTID ARTERY USING OTHER CONTRAST: ICD-10-PCS | Performed by: PSYCHIATRY & NEUROLOGY

## 2025-03-31 PROCEDURE — C2625 STENT, NON-COR, TEM W/DEL SY: HCPCS

## 2025-03-31 PROCEDURE — 6370000000 HC RX 637 (ALT 250 FOR IP)

## 2025-03-31 PROCEDURE — 3700000000 HC ANESTHESIA ATTENDED CARE

## 2025-03-31 PROCEDURE — 36224 PLACE CATH CAROTD ART: CPT

## 2025-03-31 PROCEDURE — 85347 COAGULATION TIME ACTIVATED: CPT

## 2025-03-31 PROCEDURE — 6360000002 HC RX W HCPCS: Performed by: ANESTHESIOLOGY

## 2025-03-31 PROCEDURE — 6360000002 HC RX W HCPCS

## 2025-03-31 PROCEDURE — B314YZZ FLUOROSCOPY OF LEFT COMMON CAROTID ARTERY USING OTHER CONTRAST: ICD-10-PCS | Performed by: PSYCHIATRY & NEUROLOGY

## 2025-03-31 PROCEDURE — 7100000000 HC PACU RECOVERY - FIRST 15 MIN

## 2025-03-31 PROCEDURE — 6360000002 HC RX W HCPCS: Performed by: SPECIALIST

## 2025-03-31 PROCEDURE — 80051 ELECTROLYTE PANEL: CPT

## 2025-03-31 PROCEDURE — 85576 BLOOD PLATELET AGGREGATION: CPT

## 2025-03-31 PROCEDURE — 61624 TCAT PERM OCCLS/EMBOLJ CNS: CPT

## 2025-03-31 PROCEDURE — 85014 HEMATOCRIT: CPT

## 2025-03-31 PROCEDURE — 86901 BLOOD TYPING SEROLOGIC RH(D): CPT

## 2025-03-31 PROCEDURE — 76377 3D RENDER W/INTRP POSTPROCES: CPT

## 2025-03-31 PROCEDURE — 6360000004 HC RX CONTRAST MEDICATION: Performed by: PSYCHIATRY & NEUROLOGY

## 2025-03-31 PROCEDURE — 2500000003 HC RX 250 WO HCPCS

## 2025-03-31 PROCEDURE — 2580000003 HC RX 258: Performed by: NURSE PRACTITIONER

## 2025-03-31 PROCEDURE — 7100000001 HC PACU RECOVERY - ADDTL 15 MIN

## 2025-03-31 PROCEDURE — 76937 US GUIDE VASCULAR ACCESS: CPT

## 2025-03-31 PROCEDURE — 82330 ASSAY OF CALCIUM: CPT

## 2025-03-31 PROCEDURE — 75894 X-RAYS TRANSCATH THERAPY: CPT

## 2025-03-31 PROCEDURE — B31R1ZZ FLUOROSCOPY OF INTRACRANIAL ARTERIES USING LOW OSMOLAR CONTRAST: ICD-10-PCS | Performed by: PSYCHIATRY & NEUROLOGY

## 2025-03-31 PROCEDURE — 3700000001 HC ADD 15 MINUTES (ANESTHESIA)

## 2025-03-31 PROCEDURE — C1769 GUIDE WIRE: HCPCS

## 2025-03-31 PROCEDURE — 86850 RBC ANTIBODY SCREEN: CPT

## 2025-03-31 PROCEDURE — 6360000002 HC RX W HCPCS: Performed by: NURSE PRACTITIONER

## 2025-03-31 RX ORDER — SODIUM CHLORIDE 9 MG/ML
INJECTION, SOLUTION INTRAVENOUS
Status: DISCONTINUED | OUTPATIENT
Start: 2025-03-31 | End: 2025-03-31 | Stop reason: SDUPTHER

## 2025-03-31 RX ORDER — ACETAMINOPHEN 325 MG/1
650 TABLET ORAL EVERY 4 HOURS PRN
Status: DISCONTINUED | OUTPATIENT
Start: 2025-03-31 | End: 2025-04-01 | Stop reason: HOSPADM

## 2025-03-31 RX ORDER — ONDANSETRON 2 MG/ML
INJECTION INTRAMUSCULAR; INTRAVENOUS
Status: DISCONTINUED | OUTPATIENT
Start: 2025-03-31 | End: 2025-03-31 | Stop reason: SDUPTHER

## 2025-03-31 RX ORDER — LIDOCAINE HYDROCHLORIDE 10 MG/ML
INJECTION, SOLUTION EPIDURAL; INFILTRATION; INTRACAUDAL; PERINEURAL
Status: DISCONTINUED | OUTPATIENT
Start: 2025-03-31 | End: 2025-03-31 | Stop reason: SDUPTHER

## 2025-03-31 RX ORDER — ROCURONIUM BROMIDE 10 MG/ML
INJECTION, SOLUTION INTRAVENOUS
Status: DISCONTINUED | OUTPATIENT
Start: 2025-03-31 | End: 2025-03-31 | Stop reason: SDUPTHER

## 2025-03-31 RX ORDER — IODIXANOL 270 MG/ML
100 INJECTION, SOLUTION INTRAVASCULAR
Status: COMPLETED | OUTPATIENT
Start: 2025-03-31 | End: 2025-03-31

## 2025-03-31 RX ORDER — PROTAMINE SULFATE 10 MG/ML
INJECTION, SOLUTION INTRAVENOUS
Status: DISCONTINUED | OUTPATIENT
Start: 2025-03-31 | End: 2025-03-31 | Stop reason: SDUPTHER

## 2025-03-31 RX ORDER — ONDANSETRON 2 MG/ML
4 INJECTION INTRAMUSCULAR; INTRAVENOUS EVERY 6 HOURS PRN
Status: DISCONTINUED | OUTPATIENT
Start: 2025-03-31 | End: 2025-04-01 | Stop reason: HOSPADM

## 2025-03-31 RX ORDER — LABETALOL HYDROCHLORIDE 5 MG/ML
10 INJECTION, SOLUTION INTRAVENOUS
Status: DISCONTINUED | OUTPATIENT
Start: 2025-03-31 | End: 2025-04-01 | Stop reason: HOSPADM

## 2025-03-31 RX ORDER — CARVEDILOL 12.5 MG/1
12.5 TABLET ORAL 2 TIMES DAILY
Status: DISCONTINUED | OUTPATIENT
Start: 2025-03-31 | End: 2025-04-01 | Stop reason: HOSPADM

## 2025-03-31 RX ORDER — SODIUM CHLORIDE 0.9 % (FLUSH) 0.9 %
5-40 SYRINGE (ML) INJECTION PRN
Status: DISCONTINUED | OUTPATIENT
Start: 2025-03-31 | End: 2025-04-01 | Stop reason: HOSPADM

## 2025-03-31 RX ORDER — GLYCOPYRROLATE 0.2 MG/ML
INJECTION INTRAMUSCULAR; INTRAVENOUS
Status: DISCONTINUED | OUTPATIENT
Start: 2025-03-31 | End: 2025-03-31 | Stop reason: SDUPTHER

## 2025-03-31 RX ORDER — PHENYLEPHRINE HCL IN 0.9% NACL 1 MG/10 ML
SYRINGE (ML) INTRAVENOUS
Status: DISCONTINUED | OUTPATIENT
Start: 2025-03-31 | End: 2025-03-31 | Stop reason: SDUPTHER

## 2025-03-31 RX ORDER — SODIUM CHLORIDE 0.9 % (FLUSH) 0.9 %
5-40 SYRINGE (ML) INJECTION EVERY 12 HOURS SCHEDULED
Status: DISCONTINUED | OUTPATIENT
Start: 2025-03-31 | End: 2025-04-01 | Stop reason: HOSPADM

## 2025-03-31 RX ORDER — FENTANYL CITRATE 50 UG/ML
INJECTION, SOLUTION INTRAMUSCULAR; INTRAVENOUS
Status: DISCONTINUED | OUTPATIENT
Start: 2025-03-31 | End: 2025-03-31 | Stop reason: SDUPTHER

## 2025-03-31 RX ORDER — ATORVASTATIN CALCIUM 10 MG/1
20 TABLET, FILM COATED ORAL DAILY
Status: DISCONTINUED | OUTPATIENT
Start: 2025-03-31 | End: 2025-04-01 | Stop reason: HOSPADM

## 2025-03-31 RX ORDER — HEPARIN SODIUM 1000 [USP'U]/ML
INJECTION, SOLUTION INTRAVENOUS; SUBCUTANEOUS
Status: DISCONTINUED | OUTPATIENT
Start: 2025-03-31 | End: 2025-03-31 | Stop reason: SDUPTHER

## 2025-03-31 RX ORDER — HYDRALAZINE HYDROCHLORIDE 20 MG/ML
10 INJECTION INTRAMUSCULAR; INTRAVENOUS
Status: DISCONTINUED | OUTPATIENT
Start: 2025-03-31 | End: 2025-04-01 | Stop reason: HOSPADM

## 2025-03-31 RX ORDER — ONDANSETRON 4 MG/1
4 TABLET, ORALLY DISINTEGRATING ORAL EVERY 8 HOURS PRN
Status: DISCONTINUED | OUTPATIENT
Start: 2025-03-31 | End: 2025-04-01 | Stop reason: HOSPADM

## 2025-03-31 RX ORDER — HYDRALAZINE HYDROCHLORIDE 20 MG/ML
10 INJECTION INTRAMUSCULAR; INTRAVENOUS
Status: DISCONTINUED | OUTPATIENT
Start: 2025-03-31 | End: 2025-03-31

## 2025-03-31 RX ORDER — CEFAZOLIN SODIUM 1 G/3ML
INJECTION, POWDER, FOR SOLUTION INTRAMUSCULAR; INTRAVENOUS
Status: DISCONTINUED | OUTPATIENT
Start: 2025-03-31 | End: 2025-03-31 | Stop reason: SDUPTHER

## 2025-03-31 RX ORDER — LABETALOL HYDROCHLORIDE 5 MG/ML
INJECTION, SOLUTION INTRAVENOUS
Status: DISCONTINUED | OUTPATIENT
Start: 2025-03-31 | End: 2025-03-31 | Stop reason: SDUPTHER

## 2025-03-31 RX ORDER — SODIUM CHLORIDE 9 MG/ML
INJECTION, SOLUTION INTRAVENOUS PRN
Status: DISCONTINUED | OUTPATIENT
Start: 2025-03-31 | End: 2025-04-01 | Stop reason: HOSPADM

## 2025-03-31 RX ORDER — PANTOPRAZOLE SODIUM 40 MG/1
40 TABLET, DELAYED RELEASE ORAL
Status: DISCONTINUED | OUTPATIENT
Start: 2025-04-01 | End: 2025-04-01 | Stop reason: HOSPADM

## 2025-03-31 RX ORDER — MEPERIDINE HYDROCHLORIDE 50 MG/ML
12.5 INJECTION INTRAMUSCULAR; INTRAVENOUS; SUBCUTANEOUS EVERY 5 MIN PRN
Status: DISCONTINUED | OUTPATIENT
Start: 2025-03-31 | End: 2025-03-31

## 2025-03-31 RX ORDER — ISOSORBIDE MONONITRATE 60 MG/1
60 TABLET, EXTENDED RELEASE ORAL DAILY
Status: DISCONTINUED | OUTPATIENT
Start: 2025-04-01 | End: 2025-04-01 | Stop reason: HOSPADM

## 2025-03-31 RX ORDER — METOCLOPRAMIDE HYDROCHLORIDE 5 MG/ML
10 INJECTION INTRAMUSCULAR; INTRAVENOUS
Status: COMPLETED | OUTPATIENT
Start: 2025-03-31 | End: 2025-03-31

## 2025-03-31 RX ORDER — NICARDIPINE HYDROCHLORIDE 2.5 MG/ML
INJECTION INTRAVENOUS
Status: DISPENSED
Start: 2025-03-31 | End: 2025-04-01

## 2025-03-31 RX ORDER — CLOPIDOGREL BISULFATE 75 MG/1
75 TABLET ORAL DAILY
Status: DISCONTINUED | OUTPATIENT
Start: 2025-04-01 | End: 2025-04-01 | Stop reason: HOSPADM

## 2025-03-31 RX ORDER — DIPHENHYDRAMINE HYDROCHLORIDE 50 MG/ML
12.5 INJECTION, SOLUTION INTRAMUSCULAR; INTRAVENOUS
Status: DISCONTINUED | OUTPATIENT
Start: 2025-03-31 | End: 2025-03-31

## 2025-03-31 RX ORDER — SODIUM CHLORIDE, SODIUM LACTATE, POTASSIUM CHLORIDE, CALCIUM CHLORIDE 600; 310; 30; 20 MG/100ML; MG/100ML; MG/100ML; MG/100ML
INJECTION, SOLUTION INTRAVENOUS
Status: DISCONTINUED | OUTPATIENT
Start: 2025-03-31 | End: 2025-03-31 | Stop reason: SDUPTHER

## 2025-03-31 RX ORDER — ASPIRIN 81 MG/1
81 TABLET ORAL DAILY
Status: DISCONTINUED | OUTPATIENT
Start: 2025-04-01 | End: 2025-04-01 | Stop reason: HOSPADM

## 2025-03-31 RX ORDER — NALOXONE HYDROCHLORIDE 0.4 MG/ML
INJECTION, SOLUTION INTRAMUSCULAR; INTRAVENOUS; SUBCUTANEOUS PRN
Status: DISCONTINUED | OUTPATIENT
Start: 2025-03-31 | End: 2025-03-31

## 2025-03-31 RX ORDER — PROPOFOL 10 MG/ML
INJECTION, EMULSION INTRAVENOUS
Status: DISCONTINUED | OUTPATIENT
Start: 2025-03-31 | End: 2025-03-31 | Stop reason: SDUPTHER

## 2025-03-31 RX ORDER — DROPERIDOL 2.5 MG/ML
0.62 INJECTION, SOLUTION INTRAMUSCULAR; INTRAVENOUS
Status: DISCONTINUED | OUTPATIENT
Start: 2025-03-31 | End: 2025-03-31

## 2025-03-31 RX ADMIN — SODIUM CHLORIDE, PRESERVATIVE FREE 10 ML: 5 INJECTION INTRAVENOUS at 20:13

## 2025-03-31 RX ADMIN — SODIUM CHLORIDE: 9 INJECTION, SOLUTION INTRAVENOUS at 12:38

## 2025-03-31 RX ADMIN — Medication 100 MCG: at 12:58

## 2025-03-31 RX ADMIN — Medication 5 MG: at 14:44

## 2025-03-31 RX ADMIN — FENTANYL CITRATE 50 MCG: 50 INJECTION, SOLUTION INTRAMUSCULAR; INTRAVENOUS at 12:20

## 2025-03-31 RX ADMIN — SUGAMMADEX 200 MG: 100 INJECTION, SOLUTION INTRAVENOUS at 14:40

## 2025-03-31 RX ADMIN — SODIUM CHLORIDE 5 MG/HR: 0.9 INJECTION, SOLUTION INTRAVENOUS at 16:34

## 2025-03-31 RX ADMIN — PROTAMINE SULFATE 50 MG: 10 INJECTION, SOLUTION INTRAVENOUS at 14:39

## 2025-03-31 RX ADMIN — ONDANSETRON 4 MG: 2 INJECTION INTRAMUSCULAR; INTRAVENOUS at 13:32

## 2025-03-31 RX ADMIN — SODIUM CHLORIDE 5 MG/HR: 0.9 INJECTION, SOLUTION INTRAVENOUS at 16:28

## 2025-03-31 RX ADMIN — PROPOFOL 100 MG: 10 INJECTION, EMULSION INTRAVENOUS at 12:30

## 2025-03-31 RX ADMIN — SODIUM CHLORIDE: 9 INJECTION, SOLUTION INTRAVENOUS at 14:14

## 2025-03-31 RX ADMIN — IODIXANOL 54 ML: 270 INJECTION, SOLUTION INTRAVASCULAR at 14:56

## 2025-03-31 RX ADMIN — HEPARIN SODIUM 3000 UNITS: 1000 INJECTION INTRAVENOUS; SUBCUTANEOUS at 13:26

## 2025-03-31 RX ADMIN — CARVEDILOL 12.5 MG: 12.5 TABLET, FILM COATED ORAL at 20:11

## 2025-03-31 RX ADMIN — Medication 100 MCG: at 13:10

## 2025-03-31 RX ADMIN — SODIUM CHLORIDE, POTASSIUM CHLORIDE, SODIUM LACTATE AND CALCIUM CHLORIDE: 600; 310; 30; 20 INJECTION, SOLUTION INTRAVENOUS at 14:14

## 2025-03-31 RX ADMIN — ROCURONIUM BROMIDE 10 MG: 10 INJECTION, SOLUTION INTRAVENOUS at 13:00

## 2025-03-31 RX ADMIN — ACETAMINOPHEN 325 MG: 325 TABLET ORAL at 17:36

## 2025-03-31 RX ADMIN — GLYCOPYRROLATE 0.1 MG: 0.2 INJECTION INTRAMUSCULAR; INTRAVENOUS at 13:12

## 2025-03-31 RX ADMIN — SODIUM CHLORIDE, PRESERVATIVE FREE 10 ML: 5 INJECTION INTRAVENOUS at 20:12

## 2025-03-31 RX ADMIN — PROPOFOL 100 MG: 10 INJECTION, EMULSION INTRAVENOUS at 12:22

## 2025-03-31 RX ADMIN — METOCLOPRAMIDE 10 MG: 5 INJECTION, SOLUTION INTRAMUSCULAR; INTRAVENOUS at 15:42

## 2025-03-31 RX ADMIN — SODIUM CHLORIDE 5 MG/HR: 0.9 INJECTION, SOLUTION INTRAVENOUS at 16:24

## 2025-03-31 RX ADMIN — HYDRALAZINE HYDROCHLORIDE 10 MG: 20 INJECTION INTRAMUSCULAR; INTRAVENOUS at 19:31

## 2025-03-31 RX ADMIN — GLYCOPYRROLATE 0.1 MG: 0.2 INJECTION INTRAMUSCULAR; INTRAVENOUS at 13:23

## 2025-03-31 RX ADMIN — PHENYLEPHRINE HYDROCHLORIDE 50 MCG/MIN: 10 INJECTION INTRAVENOUS at 13:15

## 2025-03-31 RX ADMIN — CEFAZOLIN 2 G: 1 INJECTION, POWDER, FOR SOLUTION INTRAMUSCULAR; INTRAVENOUS at 12:54

## 2025-03-31 RX ADMIN — HEPARIN SODIUM 500 UNITS: 1000 INJECTION INTRAVENOUS; SUBCUTANEOUS at 13:49

## 2025-03-31 RX ADMIN — SODIUM CHLORIDE, POTASSIUM CHLORIDE, SODIUM LACTATE AND CALCIUM CHLORIDE: 600; 310; 30; 20 INJECTION, SOLUTION INTRAVENOUS at 12:08

## 2025-03-31 RX ADMIN — Medication 5 MG: at 14:52

## 2025-03-31 RX ADMIN — ROCURONIUM BROMIDE 50 MG: 10 INJECTION, SOLUTION INTRAVENOUS at 12:23

## 2025-03-31 RX ADMIN — Medication 100 MCG: at 13:07

## 2025-03-31 RX ADMIN — LIDOCAINE HYDROCHLORIDE 50 MG: 10 INJECTION, SOLUTION EPIDURAL; INFILTRATION; INTRACAUDAL; PERINEURAL at 12:20

## 2025-03-31 RX ADMIN — HEPARIN SODIUM 7000 UNITS: 1000 INJECTION INTRAVENOUS; SUBCUTANEOUS at 13:04

## 2025-03-31 RX ADMIN — ATORVASTATIN CALCIUM 20 MG: 10 TABLET, FILM COATED ORAL at 20:11

## 2025-03-31 ASSESSMENT — PAIN DESCRIPTION - DESCRIPTORS: DESCRIPTORS: ACHING

## 2025-03-31 ASSESSMENT — PAIN DESCRIPTION - PAIN TYPE: TYPE: CHRONIC PAIN

## 2025-03-31 ASSESSMENT — PAIN SCALES - GENERAL
PAINLEVEL_OUTOF10: 3
PAINLEVEL_OUTOF10: 0
PAINLEVEL_OUTOF10: 3

## 2025-03-31 ASSESSMENT — PAIN DESCRIPTION - FREQUENCY: FREQUENCY: CONTINUOUS

## 2025-03-31 ASSESSMENT — PAIN - FUNCTIONAL ASSESSMENT
PAIN_FUNCTIONAL_ASSESSMENT: PREVENTS OR INTERFERES SOME ACTIVE ACTIVITIES AND ADLS
PAIN_FUNCTIONAL_ASSESSMENT: NONE - DENIES PAIN

## 2025-03-31 ASSESSMENT — ENCOUNTER SYMPTOMS: SHORTNESS OF BREATH: 1

## 2025-03-31 ASSESSMENT — PAIN DESCRIPTION - ORIENTATION: ORIENTATION: MID

## 2025-03-31 ASSESSMENT — PAIN DESCRIPTION - LOCATION: LOCATION: BACK

## 2025-03-31 ASSESSMENT — PAIN DESCRIPTION - ONSET: ONSET: ON-GOING

## 2025-03-31 NOTE — BRIEF OP NOTE
[facility]    NEUROENDOVASCULAR SERVICE: POST-OP NOTE: 3/31/2025    Pt Name: Tracey Alarcon  MRN: [id number]  YOB: 1940  Date of Procedure: 3/31/2025  Primary Care Physician: Aura Rosado MD      Pre-Procedural Diagnosis: Left cavernous ICA aneurysm status post pipeline embolization  Post-Procedural Diagnosis: Status post telescoped second pipeline shield embolization      Procedure Performed:Diagnostic Cerebral Angiogram with pipeline flow diverter embolization    Surgeon:   Americo Braden MD    Fellow:  Aníbal Vigil MD and Kehinde Olson MD PhD     Assisting Tech:  Makayla Martinez    PRE-PROCEDURAL EXAM:  Neurological exam performed and unchanged from initial H&P or consult      Anesthesia: General Anesthesia  An Immediate re-assessment was completed prior to sedation, and it is determined to be safe to proceed.  Complications: none    Intra-Operative EXAM:  Patient sedated with unchanged limited neurological exam    EBL: < Minimal      Cc            Specimens: Were not Obtained  Contrast:     Visipaque 270 low osmolar 54 Cc             Fluoro: 47.4 min    Findings:  Please see dictated Radiology note for further details  Previously treated left cavernous ICA aneurysm with pipeline shield flow diverter device is filling achieving Bhavin Adalberto score 3.  The distal end of the pipeline shield device has foreshortened and covering 50% of the aneurysm neck.  The stent is well apposed with no significant intimal hyperplasia.   The above lesion was treated with 8f short sheath, Zoom support 088, Berenstein diagnostic catheter and stiff glide wire.   Once the aneurysm surgically evaluated.  AXS CAT 5 058,  XT 27 and Synchro Support Pre-shaped were advanced coaxially across the aneurysm into the MCA/M1 horizontal segment.   The microwire was removed and a 5x25mm Pipeline Shield flow diverter device was advanced. The system was pulled back to the     ICA terminus, and

## 2025-03-31 NOTE — H&P
Neuro ICU History & Physical    Patient Name: Tracey Alarcon  Patient : 1940  Room/Bed: 0115/0115-01  Code Status: FULL  Allergies:   Allergies   Allergen Reactions    Medrol [Methylprednisolone] Other (See Comments)     Unknown reaction, \"I don't remember. It just made me feel funny.\"       CHIEF COMPLAINT     Dry mouth    HPI    History Obtained From: Patient, EMR    The patient is a 84 y.o. female with a history of an unruptured left ICA aneurysm (treated with pipeline embolization device 25), HTN, HLD, CAD, LAMIN (CPAP) and previous cholecystectomy who presented to [facility] on 3/31/25 for elective treatment of known residual unruptured left cavernous ICA aneurysm.  Cerebral aneurysm was found incidentally during work up for staring spells with associated confusion and difficulty speaking.  MRI Brain without contrast and EEG were unremarkable.  CTA Head/Neck with contrast showed a 3x56 dorsally projecting left cavernous segment ICA aneurysm.  She was referred to the Neuro Endovascular clinic and was evaluated by Dr. Braden.  In 2025 she underwent cerebral angiogram with treatment of the left ICA cavernous aneurysm with pipeline shield flow diverter device.  She was discharged home on Aspirin and Plavix.  Underwent DSA on 3/27/25 showing previously treated left cavernous ICA aneurysm with pipeline shield flow diverter device is filling achieving a Bhavin Adalberto score 3, distal end of the pipeline device foreshortened and covers 50% of aneurysm neck.  She was subsequently planned for repeat treatment.    On 3/31/25 patient underwent repeat cerebral angiogram redemonstrating residual filling of the left cavernous ICA aneurysm treated with pipeline shield flow diverter device, balloon angioplasty of the distal fishmouthing.  Admitted to the neuro ICU for close monitoring postprocedure.  Goal SBP .  Hypertensive on arrival to the Neuro ICU with 's.  Cardene infusion ordered.

## 2025-03-31 NOTE — H&P
Endovascular Neurosurgery Note    Pt Name: Tracey Alarcon  MRN: [id number]  YOB: 1940  Date of evaluation: 3/31/2025  Primary Care Physician: Aura Rosado MD      SUBJECTIVE:     S/p recent DSA that showed persistent filling of the aneurysm Bhavin III with the previously placed Pipeline Shield device covering ~50% of the aneurysm neck. Risks/benefits of the second-stage treatment were discussed with the patient who stated she would like to proceed and secure the aneurysm. She is on Aspirin and Plavix and took them this AM. No other new complaints or focal neuro deficits.       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     She was admitted to [facility] for a workup due to fatigue and facial droop. During that workup, she had an MRI and EEG. The EEG showed no evidence of any strokes or seizures. She was discharged with further treatment for high blood pressure and on a baby aspirin     Due to persist encephalopathy after discharge she sought a second opinion from Dr. Joan Ramey in Neurology the following week, who suggested that her symptoms might be due to a urinary tract infection (UTI). After taking antibiotics three days, she regained her ability to speak and the staring spells ceased. She has a history of UTI, htn, hld     She experiences difficulty walking due to balance issues and fatigue but manages to move around her house without a cane, using furniture for support. For longer distances, she uses a rolling walker. She has been diagnosed with sleep apnea and uses a CPAP machine.     She also has a right shoulder issue, which she is managing with therapy and has decided against surgery. Additionally, she has claustrophobia for MRI imaging     She is n.p.o. and took her Plavix and aspirin last night.  Will give her her dose now and proceed with left ICA cavernous segment aneurysm

## 2025-03-31 NOTE — PLAN OF CARE
Problem: Safety - Adult  Goal: Free from fall injury  Outcome: Progressing  Flowsheets (Taken 3/31/2025 1652)  Free From Fall Injury: Instruct family/caregiver on patient safety   Fall assessment preformed. Bed in low locked position with call light and tray table within reach. Education given. Will continue to monitor.    Problem: Pain  Goal: Verbalizes/displays adequate comfort level or baseline comfort level  Outcome: Progressing  Flowsheets (Taken 3/31/2025 1715)  Verbalizes/displays adequate comfort level or baseline comfort level: Encourage patient to monitor pain and request assistance   Pain scale preformed per protocol and pt treated for pain as documented. Education given.

## 2025-03-31 NOTE — PROGRESS NOTES
Last 10cc removed reoved from safe guard. Site is clean dry and intact. Site viewed with on coming nurse Carmelo Denise

## 2025-03-31 NOTE — ANESTHESIA PRE PROCEDURE
PCP Dr. Teodora Rosado MD / [place]/ last seen        Past Surgical History:        Procedure Laterality Date   • CARPAL TUNNEL RELEASE Bilateral     2 times on the right and 2 times on the left   • CHOLECYSTECTOMY     • COLONOSCOPY N/A 07/20/2021    COLONOSCOPY DIAGNOSTIC performed by Rajan Mckeon MD at [facility] ENDO   • EYE SURGERY Right     Procedure done to right eye   • HYSTERECTOMY (CERVIX STATUS UNKNOWN)  1979    \"they took part of the uterus.  I still have periods but they were every other month\", doesn't know why- no longer having menses as of 7-20-21   • KNEE ARTHROSCOPY Left    • NECK SURGERY  2001    titanium screw in neck by Dr. Herndon   • OTHER SURGICAL HISTORY  01/06/2025    IR ANGIOGRAM CAROTID CEREBRAL BILATERAL   • ROTATOR CUFF REPAIR Right    • VENTRAL HERNIA REPAIR N/A 04/05/2019    OPEN HERNIA INCISIONAL REPAIR W/MESH performed by Raciel Anderson MD at [facility] OR       Social History:    Social History     Tobacco Use   • Smoking status: Never     Passive exposure: Past   • Smokeless tobacco: Never   Substance Use Topics   • Alcohol use: No                                Counseling given: Not Answered      Vital Signs (Current):   Vitals:    03/31/25 1027   BP: (!) 151/59   Pulse: 55   Resp: 17   Temp: 97.4 °F (36.3 °C)   TempSrc: Temporal   SpO2: 98%   Weight: 99.8 kg (220 lb)   Height: 1.575 m (5' 2\")                                              BP Readings from Last 3 Encounters:   03/31/25 (!) 151/59   03/27/25 (!) 162/72   03/07/25 136/82       NPO Status: Time of last liquid consumption: 2200                        Time of last solid consumption: 2200                        Date of last liquid consumption: 03/30/25                        Date of last solid food consumption: 03/30/25    BMI:   Wt Readings from Last 3 Encounters:   03/31/25 99.8 kg (220 lb)   03/27/25 99.8 kg (220 lb)   03/07/25 100.7 kg (222 lb)     Body mass index is 40.24 kg/m².    CBC:   Lab Results

## 2025-03-31 NOTE — FLOWSHEET NOTE
Post Procedure Transfer from IR Table  [x] Tubes and Lines intact     Post Procedure Neuro-Checks/NIHSS/Vitals  [x] Completed post procedure  [x] Completed bedside handoff  [x] Frequency ordered  [x] Verbal communication of frequency      Post Procedure Puncture Site Checks  [x] Completed post procedure  [x] Completed bedside handoff  [x] Frequency ordered  [x] Verbal communication of frequency    Post Procedure Pulse  Checks  [x] Completed post procedure  [x] Completed bedside handoff  [x] Frequency ordered  [] Verbal communication of frequency    Order Set  [x] Post Neuro-Endo Procedure  [] Stroke  [] t-PA     B/P control  [x] Verbal Communication   [x] Order in Care Path    Medication Review  [x] Given during procedure  [] Current drips/meds/fluids    [] Physician Notified of All changes in Assessment    Family  [x] Location Post Procedure     Sbp 100-130  Sit up time 7915    Report given to Karen

## 2025-03-31 NOTE — PROGRESS NOTES
Pt arrived to floor via stretcher from PACU and transferred to bed. Telemetry activated. Patient oriented to room and use of call light. Call light and personal items within reach. Admission and assessment initiated. POC and education initiated and reviewed with patient/family. Telemetry-    115       . Denied further needs or questions at this time. Will continue to monitor.

## 2025-04-01 VITALS
DIASTOLIC BLOOD PRESSURE: 38 MMHG | SYSTOLIC BLOOD PRESSURE: 118 MMHG | RESPIRATION RATE: 21 BRPM | HEIGHT: 62 IN | OXYGEN SATURATION: 95 % | BODY MASS INDEX: 40.48 KG/M2 | HEART RATE: 72 BPM | TEMPERATURE: 98.7 F | WEIGHT: 220 LBS

## 2025-04-01 LAB
ANION GAP SERPL CALCULATED.3IONS-SCNC: 11 MMOL/L (ref 9–16)
BASOPHILS # BLD: 0.04 K/UL (ref 0–0.2)
BASOPHILS NFR BLD: 1 % (ref 0–2)
BUN SERPL-MCNC: 16 MG/DL (ref 8–23)
CALCIUM SERPL-MCNC: 8.6 MG/DL (ref 8.6–10.4)
CHLORIDE SERPL-SCNC: 108 MMOL/L (ref 98–107)
CO2 SERPL-SCNC: 21 MMOL/L (ref 20–31)
CREAT SERPL-MCNC: 0.8 MG/DL (ref 0.6–0.9)
EOSINOPHIL # BLD: 0.41 K/UL (ref 0–0.44)
EOSINOPHILS RELATIVE PERCENT: 7 % (ref 1–4)
ERYTHROCYTE [DISTWIDTH] IN BLOOD BY AUTOMATED COUNT: 13.5 % (ref 11.8–14.4)
GFR, ESTIMATED: 73 ML/MIN/1.73M2
GLUCOSE SERPL-MCNC: 90 MG/DL (ref 74–99)
HCT VFR BLD AUTO: 39.4 % (ref 36.3–47.1)
HGB BLD-MCNC: 11.8 G/DL (ref 11.9–15.1)
IMM GRANULOCYTES # BLD AUTO: 0.03 K/UL (ref 0–0.3)
IMM GRANULOCYTES NFR BLD: 1 %
LYMPHOCYTES NFR BLD: 1.31 K/UL (ref 1.1–3.7)
LYMPHOCYTES RELATIVE PERCENT: 21 % (ref 24–43)
MCH RBC QN AUTO: 29.1 PG (ref 25.2–33.5)
MCHC RBC AUTO-ENTMCNC: 29.9 G/DL (ref 28.4–34.8)
MCV RBC AUTO: 97 FL (ref 82.6–102.9)
MONOCYTES NFR BLD: 1.07 K/UL (ref 0.1–1.2)
MONOCYTES NFR BLD: 17 % (ref 3–12)
NEUTROPHILS NFR BLD: 53 % (ref 36–65)
NEUTS SEG NFR BLD: 3.47 K/UL (ref 1.5–8.1)
NRBC BLD-RTO: 0 PER 100 WBC
PLATELET # BLD AUTO: 234 K/UL (ref 138–453)
PMV BLD AUTO: 9.8 FL (ref 8.1–13.5)
POTASSIUM SERPL-SCNC: 4.3 MMOL/L (ref 3.7–5.3)
RBC # BLD AUTO: 4.06 M/UL (ref 3.95–5.11)
SODIUM SERPL-SCNC: 140 MMOL/L (ref 136–145)
WBC OTHER # BLD: 6.3 K/UL (ref 3.5–11.3)

## 2025-04-01 PROCEDURE — 6370000000 HC RX 637 (ALT 250 FOR IP)

## 2025-04-01 PROCEDURE — 85025 COMPLETE CBC W/AUTO DIFF WBC: CPT

## 2025-04-01 PROCEDURE — 99223 1ST HOSP IP/OBS HIGH 75: CPT | Performed by: PSYCHIATRY & NEUROLOGY

## 2025-04-01 PROCEDURE — 6360000002 HC RX W HCPCS

## 2025-04-01 PROCEDURE — 6370000000 HC RX 637 (ALT 250 FOR IP): Performed by: NURSE PRACTITIONER

## 2025-04-01 PROCEDURE — 80048 BASIC METABOLIC PNL TOTAL CA: CPT

## 2025-04-01 PROCEDURE — 36415 COLL VENOUS BLD VENIPUNCTURE: CPT

## 2025-04-01 RX ADMIN — ACETAMINOPHEN 650 MG: 325 TABLET ORAL at 09:24

## 2025-04-01 RX ADMIN — PANTOPRAZOLE SODIUM 40 MG: 40 TABLET, DELAYED RELEASE ORAL at 06:58

## 2025-04-01 RX ADMIN — CLOPIDOGREL BISULFATE 75 MG: 75 TABLET, FILM COATED ORAL at 09:24

## 2025-04-01 RX ADMIN — CARVEDILOL 12.5 MG: 12.5 TABLET, FILM COATED ORAL at 09:24

## 2025-04-01 RX ADMIN — HYDRALAZINE HYDROCHLORIDE 10 MG: 20 INJECTION INTRAMUSCULAR; INTRAVENOUS at 06:58

## 2025-04-01 RX ADMIN — ISOSORBIDE MONONITRATE 60 MG: 60 TABLET, EXTENDED RELEASE ORAL at 09:25

## 2025-04-01 RX ADMIN — ASPIRIN 81 MG: 81 TABLET, COATED ORAL at 09:24

## 2025-04-01 RX ADMIN — HYDRALAZINE HYDROCHLORIDE 10 MG: 20 INJECTION INTRAMUSCULAR; INTRAVENOUS at 09:39

## 2025-04-01 NOTE — PLAN OF CARE
Date of Service: 3/31/2025    Patient arrived to the angio suite at: 12:40 PM  Puncture obtained at: 1:03 PM 0  Vascular access was removed at: 2:42 PM  Manual pressure for minutes: 20 minutes  Patient wheeled out of the angio suite at: 3:10 PM    Diagnosis:  Left cavernous ICA aneurysm status post pipeline embolization       Procedures:  1. Right ultrasound guided femoral artery access  2. Selective left common carotid artery (CCA) cerebral angiogram   3. Selective left internal carotid artery (ICA) cerebral angiogram   4.  Left ICA artery 3D rotational angiography  5. Transarterial flow diverter (FD) device embolization of the L ICA artery aneurysm   6. Continuous IA nicardipine infusion  7. Right common femoral artery (CFA) angiogram     Neurointerventionalist:   Americo Braden MD    [place]:  Aníbal Olson MD PhD     Contrast:  54   cc of Visipaque-270.    Fluoroscopy time:    47.4 minutes    Access: Right common femoral artery.    Comparison: None     Consent: After explaining the risks and benefits to the patient and the patient's family, including but not limited to hemorrhagic and ischemic stroke, coma, death, vessel injury, dissection, tear, occlusion, and X-ray dye allergic type reaction, a signed consent form was obtained.     Indication and Clinical History:   Recent dsa concerning for aneurysm filling of the left ica with oreshortening of the prior pipeline shield device. On aspirin and clopidogrel. Gen anesthesia for cerebral angiogram with intervention.     Anesthesia: Local anesthesia with lidocaine.  GA  .    Description and findings:   The patient's right groin was prepped and draped in standard sterile fashion and under local anesthesia with conscious sedation. Ultrasound was used to insonate the right common femoral artery, which was then accessed with a micropuncture technique, and a 6 Korean 55cm intravascular sheath was placed within the right common femoral artery,

## 2025-04-01 NOTE — PROGRESS NOTES
Patient tolerated walking to bathroom as a standby, with her cane this morning well. Exam unchanged. Vitals stable.

## 2025-04-01 NOTE — PROGRESS NOTES
Writer notified NCC team of fluctuating BP via ART line. Patient asymptomatic to events. And is up to the chair. No new orders at this time.

## 2025-04-01 NOTE — PROGRESS NOTES
Safe guard removed from groin site. Writer first attempted to remove any air that may be remaining, and was unable to remove any. The dressing was removed and the safe guard appeared to be fully inflated. Stroke nurse and NCC resident notified. Groin site remains free of redness drainage and hematoma, pedal pulses palpable

## 2025-04-01 NOTE — PROGRESS NOTES
Endovascular Neurosurgery Note    Pt Name: Tracey Alarcon  MRN: [id number]  YOB: 1940  Date of evaluation: 4/1/2025  Primary Care Physician: Aura Rosado MD      SUBJECTIVE:     Status post second stage telescoped pipeline embolization of the previously treated left cavernous segment aneurysm with uneventful course.  Groin is soft with no signs of significant hematoma      History of Chief Complaint:    Tracey Alarcon is an 84-year-old female who presents for evaluation of a left cavernous 3 x 5 mm aneurysm.     She was admitted to [facility] for a workup due to fatigue and facial droop. During that workup, she had an MRI and EEG. The EEG showed no evidence of any strokes or seizures. She was discharged with further treatment for high blood pressure and on a baby aspirin     Due to persist encephalopathy after discharge she sought a second opinion from Dr. Joan Ramey in Neurology the following week, who suggested that her symptoms might be due to a urinary tract infection (UTI). After taking antibiotics three days, she regained her ability to speak and the staring spells ceased. She has a history of UTI, htn, hld     She experiences difficulty walking due to balance issues and fatigue but manages to move around her house without a cane, using furniture for support. For longer distances, she uses a rolling walker. She has been diagnosed with sleep apnea and uses a CPAP machine.     She also has a right shoulder issue, which she is managing with therapy and has decided against surgery. Additionally, she has claustrophobia for MRI imaging     She is n.p.o. and took her Plavix and aspirin last night.  Will give her her dose now and proceed with left ICA cavernous segment aneurysm embolization under general anesthesia    Interval history:   S/p recent DSA that showed persistent filling of the aneurysm Bhavin III with the previously placed Pipeline Shield device covering ~50% of the aneurysm  tobacco smoke. She has never used smokeless tobacco.   reports no history of alcohol use.   reports no history of drug use.  Family History  family history includes Diabetes in her mother; Heart Attack in her father; Heart Disease in her mother; Heart Failure in her mother.    Review of Systems:  CONSTITUTIONAL:  negative for fevers, chills, fatigue and malaise    EYES:  negative for double vision, blurred vision and photophobia     HEENT:  negative for tinnitus, epistaxis and sore throat    RESPIRATORY:  negative for cough, shortness of breath, wheezing    CARDIOVASCULAR:  negative for chest pain, palpitations, syncope, edema    GASTROINTESTINAL:  negative for nausea, vomiting    GENITOURINARY:  negative for incontinence    MUSCULOSKELETAL:  negative for neck or back pain    NEUROLOGICAL:  Negative for weakness and tingling  negative for headaches and dizziness    PSYCHIATRIC:  negative for anxiety      Review of systems otherwise negative.      OBJECTIVE:   Vitals: BP (!) 122/39   Pulse 66   Temp 98.5 °F (36.9 °C) (Oral)   Resp 14   Ht 1.575 m (5' 2\")   Wt 99.8 kg (220 lb)   SpO2 97%   BMI 40.24 kg/m²     Gen: No acute distress  MS: Awake, Oriented x3, No obvious aphasia  CN: Pupils reactive, no gaze deviation, no gross facial droop, tongue midline, no dysarthria  Motor: Moves all extremities antigravity  Sens: Responds to LT in all extremities  Gait: Deferred    NIH 0  MRS 0      LABS:     Recent Labs     03/31/25  1037 04/01/25  0312   WBC  --  6.3   HGB  --  11.8*   HCT  --  39.4   PLT  --  234   NA  --  140   K  --  4.3   CL  --  108*   CO2  --  21   BUN  --  16   CREATININE 0.6 0.8   CALCIUM  --  8.6     No results for input(s): \"ALKPHOS\", \"ALT\", \"AST\", \"BILITOT\", \"BILIDIR\", \"LABALBU\", \"AMYLASE\", \"LIPASE\" in the last 72 hours.    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           Embolization January 6, 2025    Please see

## 2025-04-01 NOTE — PLAN OF CARE
Problem: Discharge Planning  Goal: Discharge to home or other facility with appropriate resources  3/31/2025 2249 by Carmelo Washington RN  Outcome: Progressing  3/31/2025 1723 by Toshia Benavidez RN  Outcome: Progressing  Flowsheets (Taken 3/31/2025 1657)  Discharge to home or other facility with appropriate resources: Identify barriers to discharge with patient and caregiver     Problem: Pain  Goal: Verbalizes/displays adequate comfort level or baseline comfort level  3/31/2025 2249 by Carmelo Washington RN  Outcome: Progressing  3/31/2025 1723 by Toshia Benavidez RN  Outcome: Progressing  Flowsheets (Taken 3/31/2025 1715)  Verbalizes/displays adequate comfort level or baseline comfort level: Encourage patient to monitor pain and request assistance     Problem: Safety - Adult  Goal: Free from fall injury  3/31/2025 2249 by Carmelo Washington RN  Outcome: Progressing  3/31/2025 1723 by Toshia Benavidez RN  Outcome: Progressing  Flowsheets (Taken 3/31/2025 1652)  Free From Fall Injury: Instruct family/caregiver on patient safety

## 2025-04-01 NOTE — ANESTHESIA POSTPROCEDURE EVALUATION
Department of Anesthesiology  Postprocedure Note    Patient: Tracey Alarcon  MRN: [id number]  YOB: 1940  Date of evaluation: 3/31/2025    Procedure Summary       Date: 03/31/25 Room / Location: [facility]    Anesthesia Start: 1208 Anesthesia Stop: 1514    Procedure: IR ANGIOGRAM CAROTID CEREBRAL BILATERAL Diagnosis:       Aneurysm      Brain aneurysm      (Flow diverter)    Scheduled Providers: Jose Weaver APRN - CRNA Responsible Provider: Harmony Pope MD    Anesthesia Type: general ASA Status: 3            Anesthesia Type: No value filed.    Kevin Phase I: Kevni Score: 9    Kevin Phase II:      Anesthesia Post Evaluation    Patient location during evaluation: ICU  Patient participation: complete - patient participated  Level of consciousness: awake and alert  Airway patency: patent  Nausea & Vomiting: no nausea and no vomiting  Cardiovascular status: blood pressure returned to baseline  Respiratory status: acceptable  Hydration status: euvolemic  Comments: No known anesthesia related complication  Multimodal analgesia pain management approach  Pain management: adequate    No notable events documented.

## 2025-04-01 NOTE — CARE COORDINATION
Case Management Assessment  Initial Evaluation    Date/Time of Evaluation: 4/1/2025 9:37 AM  Assessment Completed by: KEKE VARGAS RN    If patient is discharged prior to next notation, then this note serves as note for discharge by case management.    Patient Name: Tracey Alarcon                   YOB: 1940  Diagnosis: Brain aneurysm [I67.1]                   Date / Time: 3/31/2025  3:57 PM    Patient Admission Status: Inpatient   Readmission Risk (Low < 19, Mod (19-27), High > 27): Readmission Risk Score: 10.6    Current PCP: Aura Rosado MD  PCP verified by CM? (P) Yes    Chart Reviewed: Yes      History Provided by: (P) Patient  Patient Orientation: (P) Alert and Oriented    Patient Cognition: (P) Alert    Hospitalization in the last 30 days (Readmission):  No    If yes, Readmission Assessment in CM Navigator will be completed.    Advance Directives:      Code Status: Full Code   Patient's Primary Decision Maker is: (P) Legal Next of Kin    Primary Decision Maker: Demario Alarcon - Legal Guardian - [phone]    Secondary Decision Maker: Dodie Alarcon - Child - [phone]    Secondary Decision Maker: bola alarcon - Child - [phone]    Secondary Decision Maker: Tanmay Alarcon Jr - Child - [phone]    Discharge Planning:    Patient lives with: Alone Type of Home: House  Primary Care Giver: (P) Self  Patient Support Systems include: (P) Family Members, Children   Current Financial resources: (P) Medicare  Current community resources:    Current services prior to admission: None            Current DME:              Type of Home Care services:  PT, OT    ADLS  Prior functional level: (P) Assistance with the following:, Shopping  Current functional level: (P) Assistance with the following:, Shopping    PT AM-PAC:   /24  OT AM-PAC:   /24    Family can provide assistance at DC: (P) Yes  Would you like Case Management to discuss the discharge plan with any other family

## 2025-04-01 NOTE — DISCHARGE SUMMARY
Neuro Critical Care   Discharge Summary      PATIENT NAME: Tracey Alarcon  YOB: 1940  MEDICAL RECORD NO. [id number]  DATE: 4/1/2025  PRIMARY CARE PHYSICIAN: Aura Rosado MD  DISCHARGE DATE:  ***  DISCHARGE DIAGNOSIS:   Patient Active Problem List   Diagnosis Code    CAD (coronary artery disease) I25.10    Gastroesophageal reflux disease K21.9    Hypertension I10    Hypercholesterolemia E78.00    Osteoarthritis of right glenohumeral joint M19.011    Chest discomfort R07.89    Mixed hyperlipidemia E78.2    Status post hysterectomy Z90.710    S/P lateral meniscal repair Z98.890    Encounter for cholecystectomy Z76.89    RAD (reactive airway disease), mild intermittent, uncomplicated J45.20    History of stroke Z86.73    Prediabetes R73.03    LAMIN (obstructive sleep apnea) G47.33    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 yo female who presented to [facility] on 3/31/2025  3:57 PM with ***.      Labs and imaging were followed daily.  At time of discharge, Tracey Alarcon was tolerating a ADULT DIET; Regular, having bowel movements, and is in *** condition to be discharged to ***.        PROCEDURES:    ***    PHYSICAL EXAMINATION        Discharge Vitals:  height is 1.575 m (5' 2\") and weight is 99.8 kg (220 lb). Her oral temperature is 98.7 °F (37.1 °C). Her blood pressure is 116/31 (abnormal) and her pulse is 76. Her respiration is 15 and oxygen saturation is 95%.     CONSTITUTIONAL:  Well developed, well nourished, alert and oriented x 3, in no acute distress. GCS 15. Nontoxic. No  minutes as needed for Chest pain up to max of 3 total doses. If no relief after 1 dose, call 911.            Diet: ADULT DIET; Regular diet as tolerated  Activity: ***  Follow-up:  ***  Time Spent for discharge: 30 minutes    Milagros Epperson MD  Neuro Critical Care  4/1/2025, 1:09 PM

## 2025-04-01 NOTE — PROGRESS NOTES
Neuro ICU Progress Note    Patient Name: Tracey Alarcon  Patient : 1940  Room/Bed: 0115/0115-01  Code Status: FULL  Allergies:   Allergies   Allergen Reactions    Medrol [Methylprednisolone] Other (See Comments)     Unknown reaction, \"I don't remember. It just made me feel funny.\"       CHIEF COMPLAINT     Dry mouth    HPI    History Obtained From: Patient, EMR    The patient is a 84 y.o. female with a history of an unruptured left ICA aneurysm (treated with pipeline embolization device 25), HTN, HLD, CAD, LAMIN (CPAP) and previous cholecystectomy who presented to [facility] on 3/31/25 for elective treatment of known residual unruptured left cavernous ICA aneurysm.  Cerebral aneurysm was found incidentally during work up for staring spells with associated confusion and difficulty speaking.  MRI Brain without contrast and EEG were unremarkable.  CTA Head/Neck with contrast showed a 3x56 dorsally projecting left cavernous segment ICA aneurysm.  She was referred to the Neuro Endovascular clinic and was evaluated by Dr. Braden.  In 2025 she underwent cerebral angiogram with treatment of the left ICA cavernous aneurysm with pipeline shield flow diverter device.  She was discharged home on Aspirin and Plavix.  Underwent DSA on 3/27/25 showing previously treated left cavernous ICA aneurysm with pipeline shield flow diverter device is filling achieving a Bhavin Adalberto score 3, distal end of the pipeline device foreshortened and covers 50% of aneurysm neck.  She was subsequently planned for repeat treatment.    On 3/31/25 patient underwent repeat cerebral angiogram redemonstrating residual filling of the left cavernous ICA aneurysm treated with pipeline shield flow diverter device, balloon angioplasty of the distal fishmouthing.  Admitted to the neuro ICU for close monitoring postprocedure.  Goal SBP .  Hypertensive on arrival to the Neuro ICU with 's.  Cardene infusion ordered.      April  Right     VENTRAL HERNIA REPAIR N/A 04/05/2019    OPEN HERNIA INCISIONAL REPAIR W/MESH performed by Raciel Anderson MD at [facility] OR       Social History:   Social History     Socioeconomic History    Marital status:      Spouse name: Not on file    Number of children: 5    Years of education: Not on file    Highest education level: 12th grade   Occupational History    Not on file   Tobacco Use    Smoking status: Never     Passive exposure: Past    Smokeless tobacco: Never   Vaping Use    Vaping status: Never Used   Substance and Sexual Activity    Alcohol use: No    Drug use: Never    Sexual activity: Not Currently     Partners: Male   Other Topics Concern    Not on file   Social History Narrative    Not on file     Social Drivers of Health     Financial Resource Strain: Low Risk  (5/1/2024)    Overall Financial Resource Strain (CARDIA)     Difficulty of Paying Living Expenses: Not hard at all   Food Insecurity: No Food Insecurity (3/31/2025)    Hunger Vital Sign     Worried About Running Out of Food in the Last Year: Never true     Ran Out of Food in the Last Year: Never true   Transportation Needs: No Transportation Needs (3/31/2025)    PRAPARE - Transportation     Lack of Transportation (Medical): No     Lack of Transportation (Non-Medical): No   Physical Activity: Sufficiently Active (9/16/2024)    Exercise Vital Sign     Days of Exercise per Week: 3 days     Minutes of Exercise per Session: 90 min   Stress: No Stress Concern Present (3/22/2021)    [place] [place] [employer] - Occupational Stress Questionnaire     Feeling of Stress : Not at all   Social Connections: Moderately Integrated (3/22/2021)    Social Connection and Isolation Panel [NHANES]     Frequency of Communication with Friends and Family: More than three times a week     Frequency of Social Gatherings with Friends and Family: Once a week     Attends [employer] Services: 1 to 4 times per year     Active Member of Clubs or

## 2025-04-02 ENCOUNTER — TELEPHONE (OUTPATIENT)
Dept: NEUROLOGY | Age: 85
End: 2025-04-02

## 2025-04-02 ENCOUNTER — APPOINTMENT (OUTPATIENT)
Dept: CT IMAGING | Age: 85
DRG: 064 | End: 2025-04-02
Payer: MEDICARE

## 2025-04-02 ENCOUNTER — CARE COORDINATION (OUTPATIENT)
Dept: CARE COORDINATION | Facility: CLINIC | Age: 85
End: 2025-04-02

## 2025-04-02 ENCOUNTER — HOSPITAL ENCOUNTER (INPATIENT)
Age: 85
LOS: 5 days | Discharge: NURSING, REHAB OR LONG-TERM CARE | DRG: 064 | End: 2025-04-07
Attending: STUDENT IN AN ORGANIZED HEALTH CARE EDUCATION/TRAINING PROGRAM | Admitting: PSYCHIATRY & NEUROLOGY
Payer: MEDICARE

## 2025-04-02 ENCOUNTER — CARE COORDINATION (OUTPATIENT)
Dept: CARE COORDINATION | Age: 85
End: 2025-04-02

## 2025-04-02 DIAGNOSIS — I63.419 CEREBROVASCULAR ACCIDENT (CVA) DUE TO EMBOLISM OF MIDDLE CEREBRAL ARTERY, UNSPECIFIED BLOOD VESSEL LATERALITY (HCC): ICD-10-CM

## 2025-04-02 DIAGNOSIS — I72.0 ANEURYSM OF LEFT CAROTID ARTERY: ICD-10-CM

## 2025-04-02 DIAGNOSIS — Z86.73 HISTORY OF STROKE: ICD-10-CM

## 2025-04-02 DIAGNOSIS — I67.1 ANEURYSM OF CAVERNOUS PORTION OF LEFT INTERNAL CAROTID ARTERY: ICD-10-CM

## 2025-04-02 DIAGNOSIS — I16.1 HYPERTENSIVE EMERGENCY: Primary | ICD-10-CM

## 2025-04-02 DIAGNOSIS — I67.1 BRAIN ANEURYSM: ICD-10-CM

## 2025-04-02 DIAGNOSIS — I10 ESSENTIAL HYPERTENSION: ICD-10-CM

## 2025-04-02 DIAGNOSIS — I48.0 PAROXYSMAL ATRIAL FIBRILLATION (HCC): ICD-10-CM

## 2025-04-02 PROBLEM — I16.0 HYPERTENSIVE URGENCY: Status: ACTIVE | Noted: 2025-04-02

## 2025-04-02 LAB
ANION GAP SERPL CALCULATED.3IONS-SCNC: 11 MMOL/L (ref 9–16)
BASOPHILS # BLD: 0.21 K/UL (ref 0–0.2)
BASOPHILS NFR BLD: 2 % (ref 0–2)
BUN BLD-MCNC: 15 MG/DL (ref 8–26)
BUN SERPL-MCNC: 15 MG/DL (ref 8–23)
CA-I BLD-SCNC: 1.24 MMOL/L (ref 1.15–1.33)
CALCIUM SERPL-MCNC: 9.3 MG/DL (ref 8.6–10.4)
CHLORIDE BLD-SCNC: 101 MMOL/L (ref 98–107)
CHLORIDE SERPL-SCNC: 103 MMOL/L (ref 98–107)
CK SERPL-CCNC: 48 U/L (ref 26–192)
CO2 BLD CALC-SCNC: 27 MMOL/L (ref 22–30)
CO2 SERPL-SCNC: 23 MMOL/L (ref 20–31)
CREAT SERPL-MCNC: 0.8 MG/DL (ref 0.6–0.9)
EGFR, POC: 86 ML/MIN/1.73M2
EOSINOPHIL # BLD: 0.11 K/UL (ref 0–0.4)
EOSINOPHILS RELATIVE PERCENT: 1 % (ref 1–4)
ERYTHROCYTE [DISTWIDTH] IN BLOOD BY AUTOMATED COUNT: 13.5 % (ref 11.8–14.4)
GFR, ESTIMATED: 73 ML/MIN/1.73M2
GLUCOSE BLD-MCNC: 99 MG/DL (ref 74–100)
GLUCOSE SERPL-MCNC: 101 MG/DL (ref 74–99)
HCO3 VENOUS: 27.8 MMOL/L (ref 22–29)
HCT VFR BLD AUTO: 39.2 % (ref 36.3–47.1)
HCT VFR BLD AUTO: 43 % (ref 36–46)
HGB BLD-MCNC: 12.7 G/DL (ref 11.9–15.1)
IMM GRANULOCYTES # BLD AUTO: 0 K/UL (ref 0–0.3)
IMM GRANULOCYTES NFR BLD: 0 %
INR PPP: 1
LYMPHOCYTES NFR BLD: 1.68 K/UL (ref 1–4.8)
LYMPHOCYTES RELATIVE PERCENT: 16 % (ref 24–44)
MCH RBC QN AUTO: 29.9 PG (ref 25.2–33.5)
MCHC RBC AUTO-ENTMCNC: 32.4 G/DL (ref 28.4–34.8)
MCV RBC AUTO: 92.2 FL (ref 82.6–102.9)
MONOCYTES NFR BLD: 0.84 K/UL (ref 0.1–0.8)
MONOCYTES NFR BLD: 8 % (ref 1–7)
MORPHOLOGY: NORMAL
MYOGLOBIN SERPL-MCNC: 37 NG/ML (ref 25–58)
NEUTROPHILS NFR BLD: 73 % (ref 36–66)
NEUTS SEG NFR BLD: 7.66 K/UL (ref 1.8–7.7)
NRBC BLD-RTO: 0 PER 100 WBC
O2 SAT, VEN: 56.9 % (ref 60–85)
PARTIAL THROMBOPLASTIN TIME: 31.6 SEC (ref 23–36.5)
PCO2 VENOUS: 44.9 MM HG (ref 41–51)
PH VENOUS: 7.4 (ref 7.32–7.43)
PLATELET # BLD AUTO: 240 K/UL (ref 138–453)
PMV BLD AUTO: 9.8 FL (ref 8.1–13.5)
PO2 VENOUS: 30 MM HG (ref 30–50)
POC ANION GAP: 16 MMOL/L (ref 7–16)
POC CREATININE: 0.7 MG/DL (ref 0.51–1.19)
POC HEMOGLOBIN (CALC): 14.6 G/DL (ref 12–16)
POC LACTIC ACID: 1.1 MMOL/L (ref 0.56–1.39)
POSITIVE BASE EXCESS, VEN: 2.4 MMOL/L (ref 0–3)
POTASSIUM BLD-SCNC: 4 MMOL/L (ref 3.5–4.5)
POTASSIUM SERPL-SCNC: 4 MMOL/L (ref 3.7–5.3)
PROTHROMBIN TIME: 13.8 SEC (ref 11.7–14.9)
RBC # BLD AUTO: 4.25 M/UL (ref 3.95–5.11)
SODIUM BLD-SCNC: 143 MMOL/L (ref 138–146)
SODIUM SERPL-SCNC: 137 MMOL/L (ref 136–145)
TROPONIN I SERPL HS-MCNC: 8 NG/L (ref 0–14)
WBC OTHER # BLD: 10.5 K/UL (ref 3.5–11.3)

## 2025-04-02 PROCEDURE — 99222 1ST HOSP IP/OBS MODERATE 55: CPT | Performed by: PSYCHIATRY & NEUROLOGY

## 2025-04-02 PROCEDURE — 4A03X5D MEASUREMENT OF ARTERIAL FLOW, INTRACRANIAL, EXTERNAL APPROACH: ICD-10-PCS | Performed by: STUDENT IN AN ORGANIZED HEALTH CARE EDUCATION/TRAINING PROGRAM

## 2025-04-02 PROCEDURE — 85610 PROTHROMBIN TIME: CPT

## 2025-04-02 PROCEDURE — 70496 CT ANGIOGRAPHY HEAD: CPT

## 2025-04-02 PROCEDURE — 82947 ASSAY GLUCOSE BLOOD QUANT: CPT

## 2025-04-02 PROCEDURE — 85014 HEMATOCRIT: CPT

## 2025-04-02 PROCEDURE — 85025 COMPLETE CBC W/AUTO DIFF WBC: CPT

## 2025-04-02 PROCEDURE — 6370000000 HC RX 637 (ALT 250 FOR IP): Performed by: PSYCHIATRY & NEUROLOGY

## 2025-04-02 PROCEDURE — 99285 EMERGENCY DEPT VISIT HI MDM: CPT

## 2025-04-02 PROCEDURE — 85730 THROMBOPLASTIN TIME PARTIAL: CPT

## 2025-04-02 PROCEDURE — 82550 ASSAY OF CK (CPK): CPT

## 2025-04-02 PROCEDURE — 2060000000 HC ICU INTERMEDIATE R&B

## 2025-04-02 PROCEDURE — 94761 N-INVAS EAR/PLS OXIMETRY MLT: CPT

## 2025-04-02 PROCEDURE — 93005 ELECTROCARDIOGRAM TRACING: CPT

## 2025-04-02 PROCEDURE — 82565 ASSAY OF CREATININE: CPT

## 2025-04-02 PROCEDURE — 84520 ASSAY OF UREA NITROGEN: CPT

## 2025-04-02 PROCEDURE — 80048 BASIC METABOLIC PNL TOTAL CA: CPT

## 2025-04-02 PROCEDURE — 82553 CREATINE MB FRACTION: CPT

## 2025-04-02 PROCEDURE — 82803 BLOOD GASES ANY COMBINATION: CPT

## 2025-04-02 PROCEDURE — 84484 ASSAY OF TROPONIN QUANT: CPT

## 2025-04-02 PROCEDURE — 99223 1ST HOSP IP/OBS HIGH 75: CPT | Performed by: PSYCHIATRY & NEUROLOGY

## 2025-04-02 PROCEDURE — 6360000002 HC RX W HCPCS: Performed by: PSYCHIATRY & NEUROLOGY

## 2025-04-02 PROCEDURE — 80051 ELECTROLYTE PANEL: CPT

## 2025-04-02 PROCEDURE — 82330 ASSAY OF CALCIUM: CPT

## 2025-04-02 PROCEDURE — 70450 CT HEAD/BRAIN W/O DYE: CPT

## 2025-04-02 PROCEDURE — 2500000003 HC RX 250 WO HCPCS: Performed by: PSYCHIATRY & NEUROLOGY

## 2025-04-02 PROCEDURE — 83605 ASSAY OF LACTIC ACID: CPT

## 2025-04-02 PROCEDURE — 83874 ASSAY OF MYOGLOBIN: CPT

## 2025-04-02 PROCEDURE — 6360000004 HC RX CONTRAST MEDICATION

## 2025-04-02 RX ORDER — MAGNESIUM SULFATE IN WATER 40 MG/ML
2000 INJECTION, SOLUTION INTRAVENOUS PRN
Status: DISCONTINUED | OUTPATIENT
Start: 2025-04-02 | End: 2025-04-07 | Stop reason: HOSPADM

## 2025-04-02 RX ORDER — ACETAMINOPHEN 325 MG/1
650 TABLET ORAL EVERY 6 HOURS PRN
Status: DISCONTINUED | OUTPATIENT
Start: 2025-04-02 | End: 2025-04-07 | Stop reason: HOSPADM

## 2025-04-02 RX ORDER — AMLODIPINE BESYLATE 5 MG/1
5 TABLET ORAL NIGHTLY
Status: DISCONTINUED | OUTPATIENT
Start: 2025-04-02 | End: 2025-04-07 | Stop reason: HOSPADM

## 2025-04-02 RX ORDER — ONDANSETRON 2 MG/ML
4 INJECTION INTRAMUSCULAR; INTRAVENOUS EVERY 6 HOURS PRN
Status: DISCONTINUED | OUTPATIENT
Start: 2025-04-02 | End: 2025-04-04 | Stop reason: SDUPTHER

## 2025-04-02 RX ORDER — POTASSIUM CHLORIDE 7.45 MG/ML
10 INJECTION INTRAVENOUS PRN
Status: DISCONTINUED | OUTPATIENT
Start: 2025-04-02 | End: 2025-04-07 | Stop reason: HOSPADM

## 2025-04-02 RX ORDER — SODIUM CHLORIDE 0.9 % (FLUSH) 0.9 %
5-40 SYRINGE (ML) INJECTION EVERY 12 HOURS SCHEDULED
Status: DISCONTINUED | OUTPATIENT
Start: 2025-04-02 | End: 2025-04-07 | Stop reason: HOSPADM

## 2025-04-02 RX ORDER — ONDANSETRON 4 MG/1
4 TABLET, ORALLY DISINTEGRATING ORAL EVERY 8 HOURS PRN
Status: DISCONTINUED | OUTPATIENT
Start: 2025-04-02 | End: 2025-04-04 | Stop reason: SDUPTHER

## 2025-04-02 RX ORDER — ATORVASTATIN CALCIUM 40 MG/1
40 TABLET, FILM COATED ORAL NIGHTLY
Status: DISCONTINUED | OUTPATIENT
Start: 2025-04-02 | End: 2025-04-07 | Stop reason: HOSPADM

## 2025-04-02 RX ORDER — CLOPIDOGREL BISULFATE 75 MG/1
75 TABLET ORAL DAILY
Status: DISCONTINUED | OUTPATIENT
Start: 2025-04-03 | End: 2025-04-07 | Stop reason: HOSPADM

## 2025-04-02 RX ORDER — ASPIRIN 81 MG/1
81 TABLET ORAL DAILY
Status: DISCONTINUED | OUTPATIENT
Start: 2025-04-03 | End: 2025-04-07 | Stop reason: HOSPADM

## 2025-04-02 RX ORDER — CARVEDILOL 12.5 MG/1
12.5 TABLET ORAL 2 TIMES DAILY
Status: DISCONTINUED | OUTPATIENT
Start: 2025-04-02 | End: 2025-04-07 | Stop reason: HOSPADM

## 2025-04-02 RX ORDER — SODIUM CHLORIDE 0.9 % (FLUSH) 0.9 %
5-40 SYRINGE (ML) INJECTION PRN
Status: DISCONTINUED | OUTPATIENT
Start: 2025-04-02 | End: 2025-04-07 | Stop reason: HOSPADM

## 2025-04-02 RX ORDER — ENOXAPARIN SODIUM 100 MG/ML
40 INJECTION SUBCUTANEOUS DAILY
Status: DISCONTINUED | OUTPATIENT
Start: 2025-04-02 | End: 2025-04-07 | Stop reason: HOSPADM

## 2025-04-02 RX ORDER — SODIUM CHLORIDE 9 MG/ML
INJECTION, SOLUTION INTRAVENOUS PRN
Status: DISCONTINUED | OUTPATIENT
Start: 2025-04-02 | End: 2025-04-07 | Stop reason: HOSPADM

## 2025-04-02 RX ORDER — POTASSIUM CHLORIDE 1500 MG/1
40 TABLET, EXTENDED RELEASE ORAL PRN
Status: DISCONTINUED | OUTPATIENT
Start: 2025-04-02 | End: 2025-04-07 | Stop reason: HOSPADM

## 2025-04-02 RX ORDER — IOPAMIDOL 755 MG/ML
75 INJECTION, SOLUTION INTRAVASCULAR
Status: COMPLETED | OUTPATIENT
Start: 2025-04-02 | End: 2025-04-02

## 2025-04-02 RX ORDER — ISOSORBIDE MONONITRATE 60 MG/1
60 TABLET, EXTENDED RELEASE ORAL DAILY
Status: DISCONTINUED | OUTPATIENT
Start: 2025-04-02 | End: 2025-04-06

## 2025-04-02 RX ORDER — ACETAMINOPHEN 650 MG/1
650 SUPPOSITORY RECTAL EVERY 6 HOURS PRN
Status: DISCONTINUED | OUTPATIENT
Start: 2025-04-02 | End: 2025-04-07 | Stop reason: HOSPADM

## 2025-04-02 RX ORDER — NICARDIPINE HYDROCHLORIDE 0.1 MG/ML
2.5-15 INJECTION INTRAVENOUS CONTINUOUS
Status: DISCONTINUED | OUTPATIENT
Start: 2025-04-02 | End: 2025-04-03

## 2025-04-02 RX ORDER — LABETALOL HYDROCHLORIDE 5 MG/ML
10 INJECTION, SOLUTION INTRAVENOUS
Status: DISCONTINUED | OUTPATIENT
Start: 2025-04-02 | End: 2025-04-04

## 2025-04-02 RX ORDER — POLYETHYLENE GLYCOL 3350 17 G/17G
17 POWDER, FOR SOLUTION ORAL DAILY PRN
Status: DISCONTINUED | OUTPATIENT
Start: 2025-04-02 | End: 2025-04-04 | Stop reason: SDUPTHER

## 2025-04-02 RX ADMIN — SODIUM CHLORIDE, PRESERVATIVE FREE 10 ML: 5 INJECTION INTRAVENOUS at 21:37

## 2025-04-02 RX ADMIN — ENOXAPARIN SODIUM 40 MG: 100 INJECTION SUBCUTANEOUS at 17:34

## 2025-04-02 RX ADMIN — IOPAMIDOL 75 ML: 755 INJECTION, SOLUTION INTRAVENOUS at 12:52

## 2025-04-02 RX ADMIN — ISOSORBIDE MONONITRATE 60 MG: 60 TABLET, EXTENDED RELEASE ORAL at 17:32

## 2025-04-02 RX ADMIN — AMLODIPINE BESYLATE 5 MG: 5 TABLET ORAL at 21:37

## 2025-04-02 RX ADMIN — ATORVASTATIN CALCIUM 40 MG: 40 TABLET, FILM COATED ORAL at 21:36

## 2025-04-02 RX ADMIN — ACETAMINOPHEN 650 MG: 325 TABLET ORAL at 21:36

## 2025-04-02 RX ADMIN — CARVEDILOL 12.5 MG: 12.5 TABLET, FILM COATED ORAL at 17:32

## 2025-04-02 RX ADMIN — CARVEDILOL 12.5 MG: 12.5 TABLET, FILM COATED ORAL at 21:36

## 2025-04-02 ASSESSMENT — PAIN SCALES - GENERAL
PAINLEVEL_OUTOF10: 7
PAINLEVEL_OUTOF10: 3

## 2025-04-02 ASSESSMENT — PAIN - FUNCTIONAL ASSESSMENT: PAIN_FUNCTIONAL_ASSESSMENT: NONE - DENIES PAIN

## 2025-04-02 NOTE — CONSULTS
[facility]     Department of Internal Medicine - Staff Internal Medicine Teaching Service          Consult Note  Date: 4/2/2025  Patient Name: Tracey Alarcon  Date of admission: 4/2/2025 12:20 PM  YOB: 1940  PCP: Aura Rosado MD  History Obtained From:  patient, electronic medical record    Consult Reason:     Consult Reason: Hypertension    HISTORY OF PRESENTING ILLNESS     The patient is a pleasant 84 y.o. female presents with a chief complaint of headaches in the ED. Patient was found to be having SBP >220.    Patient has a past medical history of  Unruptured Left ICA aneurysm s/p pipeline embolization  device on 1/6/2025  Hypertension  CAD  LAMIN    Patient was discharged yesterday 4/1/2025 from Neuro critical care service. Patient is admitted to the neurology service. CT head and CTA head showed excepted post procedure changes,  Internal medicine is consulted for hypertension management.    Review of Systems   Constitutional:  Negative for activity change, appetite change and fever.   HENT:  Negative for congestion, postnasal drip and rhinorrhea.    Eyes:  Negative for photophobia and discharge.   Respiratory:  Negative for chest tightness, shortness of breath and stridor.    Cardiovascular:  Negative for chest pain, palpitations and leg swelling.   Gastrointestinal:  Negative for abdominal distention, abdominal pain, blood in stool, constipation, diarrhea and nausea.   Endocrine: Negative for cold intolerance and heat intolerance.   Genitourinary:  Negative for difficulty urinating, dysuria, enuresis and flank pain.   Musculoskeletal:  Negative for arthralgias, gait problem, joint swelling and myalgias.   Skin:  Negative for color change and wound.   Neurological:  Positive for headaches. Negative for seizures and light-headedness.   Hematological:  Does not bruise/bleed easily.   Psychiatric/Behavioral:  Negative for agitation, behavioral problems and

## 2025-04-02 NOTE — ED NOTES
(CHOLECALCIFEROL) 25 MCG (1000 UT) TABS TABLET    Take 1 tablet by mouth daily    ZINC PO    Take 1 tablet by mouth daily     Orders Placed This Encounter   Medications    iopamidol (ISOVUE-370) 76 % injection 75 mL       SURGICAL HISTORY       Past Surgical History:   Procedure Laterality Date    CARPAL TUNNEL RELEASE Bilateral     2 times on the right and 2 times on the left    CHOLECYSTECTOMY      COLONOSCOPY N/A 07/20/2021    COLONOSCOPY DIAGNOSTIC performed by Rajan Mckeon MD at [facility] ENDO    EYE SURGERY Right     Procedure done to right eye    HYSTERECTOMY (CERVIX STATUS UNKNOWN)  1979    \"they took part of the uterus.  I still have periods but they were every other month\", doesn't know why- no longer having menses as of 7-20-21    KNEE ARTHROSCOPY Left     NECK SURGERY  2001    titanium screw in neck by Dr. Herndon    OTHER SURGICAL HISTORY  01/06/2025    IR ANGIOGRAM CAROTID CEREBRAL BILATERAL    ROTATOR CUFF REPAIR Right     VENTRAL HERNIA REPAIR N/A 04/05/2019    OPEN HERNIA INCISIONAL REPAIR W/MESH performed by Raciel Anderson MD at [facility] OR       PAST MEDICAL HISTORY       Past Medical History:   Diagnosis Date    Arthritis     Borderline diabetic     controlled with diet    Bronchitis 12/12/2023    CAD (coronary artery disease)     Class 3 severe obesity without serious comorbidity with body mass index (BMI) of 45.0 to 49.9 in adult 08/08/2021    Constipation     GERD (gastroesophageal reflux disease)     Hyperlipidemia     Hypertension     Incisional hernia     Irregular heart beat     Noted 7-20-21    Mass of soft palate     Noted 7-20-21    Prolonged emergence from general anesthesia     Seasonal allergies     Short of breath on exertion     Sleep apnea     C Pap machine    SOB (shortness of breath) 08/23/2021    TIA (transient ischemic attack) 04/15/2024    Under care of team     NeuroVasc Dr. Braden/ [place]/ last seen     Wears glasses     Wellness examination     PCP Dr. Araiza  Ashlee GLASS / [place]/ last seen        Labs:  Labs Reviewed   VENOUS BLOOD GAS, POINT OF CARE - Abnormal; Notable for the following components:       Result Value    O2 Sat, Andre 56.9 (*)     All other components within normal limits   STROKE PANEL   ELECTROLYTES PLUS   HGB/HCT   CALCIUM, IONIC (POC)   CREATININE W/GFR POINT OF CARE   UREA N (POC)   LACTIC ACID,POINT OF CARE   POCT GLUCOSE       Electronically signed by Elayne Ronquillo RN on 4/2/2025 at 1:08 PM

## 2025-04-02 NOTE — CONSULTS
appetite change, chills, fatigue, fever and unexpected weight change.   HENT:  Negative for congestion, drooling, hearing loss, nosebleeds, tinnitus, trouble swallowing and voice change.    Eyes:  Negative for photophobia, pain, discharge, redness, itching and visual disturbance.   Respiratory:  Negative for apnea, cough, choking, chest tightness, shortness of breath and wheezing.    Cardiovascular:  Negative for chest pain, palpitations and leg swelling.   Gastrointestinal:  Negative for abdominal distention, abdominal pain, constipation, diarrhea, nausea and vomiting.   Endocrine: Negative for cold intolerance, heat intolerance, polydipsia and polyphagia.   Genitourinary:  Negative for difficulty urinating, dysuria, flank pain, frequency and hematuria.   Musculoskeletal:  Negative for arthralgias, back pain, gait problem, myalgias, neck pain and neck stiffness.   Neurological:  Positive for headaches. Negative for dizziness, tremors, seizures, syncope, facial asymmetry, speech difficulty, weakness, light-headedness and numbness.   Psychiatric/Behavioral:  Negative for agitation, behavioral problems, confusion, decreased concentration, dysphoric mood, hallucinations and sleep disturbance. The patient is not nervous/anxious and is not hyperactive.      Past Histories          Diagnosis Date    Arthritis     Borderline diabetic     controlled with diet    Bronchitis 12/12/2023    CAD (coronary artery disease)     Class 3 severe obesity without serious comorbidity with body mass index (BMI) of 45.0 to 49.9 in adult 08/08/2021    Constipation     GERD (gastroesophageal reflux disease)     Hyperlipidemia     Hypertension     Incisional hernia     Irregular heart beat     Noted 7-20-21    Mass of soft palate     Noted 7-20-21    Prolonged emergence from general anesthesia     Seasonal allergies     Short of breath on exertion     Sleep apnea     C Pap machine    SOB (shortness of breath) 08/23/2021    TIA (transient  Kehinde Olson MD.    Chris Munson DO   4/2/2025  4:17 PM

## 2025-04-02 NOTE — CARE COORDINATION
Case Management Assessment  Initial Evaluation    Date/Time of Evaluation: 4/2/2025 1:50 PM  Assessment Completed by: Munira Ornelas RN    If patient is discharged prior to next notation, then this note serves as note for discharge by case management.    Patient Name: Tracey Alarcon                   YOB: 1940  Diagnosis: No admission diagnoses are documented for this encounter.                   Date / Time: 4/2/2025 12:20 PM    Patient Admission Status: Emergency   Readmission Risk (Low < 19, Mod (19-27), High > 27): Readmission Risk Score: 11.6    Current PCP: Aura Rosado MD  PCP verified by CM? (P) Yes    Chart Reviewed: Yes      History Provided by: (P) Patient  Patient Orientation: (P) Alert and Oriented    Patient Cognition: (P) Alert    Hospitalization in the last 30 days (Readmission):  No    If yes, Readmission Assessment in CM Navigator will be completed.    Advance Directives:      Code Status: Prior   Patient's Primary Decision Maker is: (P) Legal Next of Kin    Primary Decision Maker: Demario Alarcon - Legal Guardian - [phone]    Secondary Decision Maker: Dodie Alarcon - Child - [phone]    Secondary Decision Maker: bola alarcon - Child - [phone]    Secondary Decision Maker: Tanmay Alarcon Jr - Child - [phone]    Discharge Planning:    Patient lives with: (P) Alone Type of Home: (P) House  Primary Care Giver: (P) Self  Patient Support Systems include: (P) Family Members   Current Financial resources: (P) Medicare  Current community resources:    Current services prior to admission: (P) None            Current DME:              Type of Home Care services:  (P) None    ADLS  Prior functional level: (P) Assistance with the following:  Current functional level: (P) Assistance with the following:    PT AM-PAC:   /24  OT AM-PAC:   /24    Family can provide assistance at DC: (P) Yes  Would you like Case Management to discuss the discharge plan with any other

## 2025-04-02 NOTE — ED TRIAGE NOTES
Per pt and family Dr Braden did an aneurysm repair on Monday, some time between last night and this morning pt daughter states that she has a new Rt sided facial droop and RUE and RLE weakness.

## 2025-04-02 NOTE — ED PROVIDER NOTES
[facility]     Emergency Department     Faculty Attestation    I performed a history and physical examination of the patient and discussed management with the resident. I reviewed the resident’s note and agree with the documented findings and plan of care. Any areas of disagreement are noted on the chart. I was personally present for the key portions of any procedures. I have documented in the chart those procedures where I was not present during the key portions. I have reviewed the emergency nurses triage note. I agree with the chief complaint, past medical history, past surgical history, allergies, medications, social and family history as documented unless otherwise noted below. Documentation of the HPI, Physical Exam and Medical Decision Making performed by medical students or scribes is based on my personal performance of the HPI, PE and MDM. For Physician Assistant/ Nurse Practitioner cases/documentation I have personally evaluated this patient and have completed at least one if not all key elements of the E/M (history, physical exam, and MDM). Additional findings are as noted.    Vital signs:   Vitals:    04/02/25 1615   BP: (!) 152/57   Pulse: 64   Resp: 18   Temp:    SpO2:    84-year-old female presents to the emergency department due to right-sided facial droop, right sided upper and lower extremity weakness.  She did have a cerebral aneurysm clipped by Dr. Braden on Monday, is on Plavix.  Last known well sometime last night, on arrival patient is hypertensive.  Blood glucose within normal limits, stroke alert noncritical called.    Plan for admission, patient started on Cardene drip due to elevated pressures.  Laboratory workup shows no significant abnormality.  Neurology recommends blood pressure goal of 1  systolic, will admit patient to their service.            Antonia Oneill M.D,  Attending Emergency  Physician          Antonia Oneill MD  04/02/25 7430

## 2025-04-02 NOTE — H&P
General Neurology H&P Note    Patient: Tracey Alarcon : 1940  MRN: [id number]   Date of admission: 25   Reason for admission: Severe headaches  Information obtained from: Patient, chart review  Allergies:  Medrol [methylprednisolone]    History of Presenting Illness      Tracey Alarcon is an 84-year-old female with a history of an unruptured left ICA aneurysm (treated with a pipeline embolization device on 25), hypertension, hyperlipidemia, CAD, LAMIN (on CPAP), and prior cholecystectomy. She was recently admitted to [facility] 3/31/25 for elective treatment of a residual unruptured left cavernous ICA aneurysm and discharged in stable condition on 25. Since discharge, she has had a persistent, sharp, nonpositional left-sided headache without nausea, vomiting, or visual symptoms.    She now presents with worsening of the same headache headache. On arrival, her SBP was 229 and glucose 101. She was evaluated as a stroke alert due to chronic right-sided weakness. Her NIH stroke scale was 3; mild facial droop and mild drift in the right arm and leg--consistent with her baseline. She denied any new or worsening focal symptoms. CT head was negative for acute findings, and CTA of the head and neck showed expected post-procedure changes, confirmed by the neuroendovascular team. She is admitted for close neurologic monitoring/headache management and blood pressure control.  On reevaluation, patient reports significant improvement in headache coinciding with improvement in blood pressure.      Review of Systems   Constitutional:  Negative for activity change, appetite change, chills, fatigue, fever and unexpected weight change.   HENT:  Negative for congestion, drooling, hearing loss, nosebleeds, tinnitus, trouble swallowing and voice change.    Eyes:  Negative for photophobia, pain, discharge, redness, itching and visual disturbance.   Respiratory:  Negative for apnea, cough, choking, chest tightness,  with cervical and intracranial ICA projections via R radial access with significant tortuosity of the arch despite SIM 2 catheter. -- L ICA cavernous saccular aneurysm measuring 5 in width x 5.19 mm in height, previously-treated with Pipeline Shield FD, now showing persistent contrast filling, Bhavin III. The distal end of the Pipeline Shield FD has foreshortened and covering 50% of the aneurysm neck.  The stent is well apposed with no significant intimal hyperplasia --No other evidence of vasculitis, occlusion, aneurysm, vascular malformation, arterial dissection, stenosis, or veno-occlusive disease. Americo Braden MD Electronically signed by Americo Braden      Summary & Plan     Tracey Alarcon is an 84 year old female with a history of treated left ICA aneurysm and chronic mild right-sided weakness, presents with worsening left-sided headache and severe hypertension following recent elective aneurysm treatment. Imaging showed no acute findings. She is admitted for neurologic monitoring and BP management, with headache improving with blood pressure improvement.    Plan:  Continue with dual antiplatelet therapy  Atorvastatin 40 mg nightly  Resume home Coreg 12.5 mg twice daily, resume Imdur 60 mg daily.  Patient was recently taken off of Norvasc 5 mg daily on 3/7/2025, will resume  SBP goal 120-160  Tylenol as needed for headache management      Patient was discussed with attending physician.  -------------------------  Chris Munson DO  Neurology Resident, PGY-4  [employer]

## 2025-04-02 NOTE — ED PROVIDER NOTES
[facility] EMERGENCY DEPARTMENT  Emergency Department Encounter  Emergency Medicine Resident     Pt Name:Tracey Alarcon  MRN: [id number]  Birthdate 1940  Date of evaluation: 4/2/25  PCP:  Aura Rosado MD  Note Started: 12:27 PM EDT      CHIEF COMPLAINT       No chief complaint on file.      HISTORY OF PRESENT ILLNESS  (Location/Symptom, Timing/Onset, Context/Setting, Quality, Duration, Modifying Factors, Severity.)      Tracey Alarcon is a 84 y.o. female who presents with facial droop right upper extremity weakness.  Last known well 9:30 PM last night.  States this morning she was difficulty ambulating and left ICA cavernous segment aneurysm embolization with Dr. Braden on 3/31/2025.  Patient Nuys any urinary frequency burning or blood in her urine.  Patient states that she does have a prior history of rotator cuff injury.        PAST MEDICAL / SURGICAL / SOCIAL / FAMILY HISTORY      has a past medical history of Arthritis, Borderline diabetic, Bronchitis, CAD (coronary artery disease), Class 3 severe obesity without serious comorbidity with body mass index (BMI) of 45.0 to 49.9 in adult, Constipation, GERD (gastroesophageal reflux disease), Hyperlipidemia, Hypertension, Incisional hernia, Irregular heart beat, Mass of soft palate, Prolonged emergence from general anesthesia, Seasonal allergies, Short of breath on exertion, Sleep apnea, SOB (shortness of breath), TIA (transient ischemic attack), Under care of team, Wears glasses, and Wellness examination.       has a past surgical history that includes Knee arthroscopy (Left); Neck surgery (2001); Rotator cuff repair (Right); Cholecystectomy; Carpal tunnel release (Bilateral); Hysterectomy (1979); ventral hernia repair (N/A, 04/05/2019); Eye surgery (Right); Colonoscopy (N/A, 07/20/2021); and other surgical history (01/06/2025).      Social History     Socioeconomic History    Marital status:      Spouse name: Not on file    Number of  the right paraclinoid ICA.  6. Atelectasis and/or mosaic attenuation throughout the imaged lung apices.   [GI]      ED Course User Index  [GI] Tomas Ledesma DO       PROCEDURES:      CONSULTS:  IP CONSULT TO INTERNAL MEDICINE    CRITICAL CARE:  There was significant risk of life threatening deterioration of patient's condition requiring my direct management. Critical care time minutes, excluding any documented procedures.    FINAL IMPRESSION      1. Hypertensive emergency          DISPOSITION / PLAN     DISPOSITION Admitted 04/02/2025 02:12:28 PM               PATIENT REFERRED TO:  No follow-up provider specified.    DISCHARGE MEDICATIONS:  New Prescriptions    No medications on file       Tomas Ledesma DO  Emergency Medicine Resident    (Please note that portions of this note were completed with a voice recognition program.  Efforts were made to edit the dictations but occasionally words are mis-transcribed.)

## 2025-04-02 NOTE — TELEPHONE ENCOUNTER
Patient daughter Dodie called and stated pt has a severe headache above her left eye. Please advise.

## 2025-04-02 NOTE — CARE COORDINATION
4/2/2025 12:37 PM  *  RPM Alert   Remote Patient Monitoring Note      Date/Time:  4/2/2025 12:37 PM  RPM yellow alert received for no metrics taken / updated x 2 days. Pt admitted to [facility] on 03/31/25 - 04/01/25 and is currently at [facility] ED. No outreach by this nurse indicated at this time. Will updated ACM.   Background: Pt enrolled in RPM r/t HTN  Plan/Follow Up: Will continue to review, monitor and address alerts with follow up based on severity of symptoms and risk factors.

## 2025-04-02 NOTE — PROGRESS NOTES
[facility]     Emergency/Trauma Note    PATIENT NAME: Tracey Alarcon    Shift date: 04/02/2025  Shift day: Wednesday   Shift # 1    Room # 34/34     Name: Tracey Alarcon            Age: 84 y.o.  Gender: female          [place]: Adventism   Place of Qu[employer]:     Trauma/Incident type: Stroke Alert  Admit Date & Time: 4/2/2025 12:20 PM  TRAUMA NAME: None    PATIENT/EVENT DESCRIPTION:  Tracey Alarcon is a 84 y.o. female who arrived ED as stroke alert. Patient to be admitted to 34/34. Patient was conscious and responsive when  entered her room.     SPIRITUAL ASSESSMENT-INTERVENTION-OUTCOME:  Patient was raise Adventism and receptive to spiritual care. Family was present and open to prayer.  maintained listening presence, offered support and prayed with patient and family. Patient and family expressed gratitude for the spiritual and emotional support they received.      PATIENT BELONGINGS:  No belongings noted    ANY BELONGINGS OF SIGNIFICANT VALUE NOTED:  Unknown    REGISTRATION STAFF NOTIFIED?  Yes    WHAT IS YOUR SPIRITUAL CARE PLAN FOR THIS PATIENT?:  Follow up visits recommended for ongoing assessment of patient's condition and for more prayers and support.     Electronically signed by Chaplain Caren, on 4/2/2025 at 2:10 PM.  [facility]  [phone]

## 2025-04-03 ENCOUNTER — CARE COORDINATION (OUTPATIENT)
Dept: CARE COORDINATION | Age: 85
End: 2025-04-03

## 2025-04-03 ENCOUNTER — APPOINTMENT (OUTPATIENT)
Dept: MRI IMAGING | Age: 85
DRG: 064 | End: 2025-04-03
Payer: MEDICARE

## 2025-04-03 PROCEDURE — 97166 OT EVAL MOD COMPLEX 45 MIN: CPT

## 2025-04-03 PROCEDURE — 2500000003 HC RX 250 WO HCPCS

## 2025-04-03 PROCEDURE — 99232 SBSQ HOSP IP/OBS MODERATE 35: CPT | Performed by: PSYCHIATRY & NEUROLOGY

## 2025-04-03 PROCEDURE — 97162 PT EVAL MOD COMPLEX 30 MIN: CPT

## 2025-04-03 PROCEDURE — 6360000002 HC RX W HCPCS

## 2025-04-03 PROCEDURE — 6370000000 HC RX 637 (ALT 250 FOR IP): Performed by: PSYCHIATRY & NEUROLOGY

## 2025-04-03 PROCEDURE — 2060000000 HC ICU INTERMEDIATE R&B

## 2025-04-03 PROCEDURE — 97530 THERAPEUTIC ACTIVITIES: CPT

## 2025-04-03 PROCEDURE — 70551 MRI BRAIN STEM W/O DYE: CPT

## 2025-04-03 PROCEDURE — 6360000002 HC RX W HCPCS: Performed by: PSYCHIATRY & NEUROLOGY

## 2025-04-03 PROCEDURE — 2500000003 HC RX 250 WO HCPCS: Performed by: PSYCHIATRY & NEUROLOGY

## 2025-04-03 PROCEDURE — 99221 1ST HOSP IP/OBS SF/LOW 40: CPT | Performed by: INTERNAL MEDICINE

## 2025-04-03 RX ORDER — MAGNESIUM SULFATE IN WATER 40 MG/ML
2000 INJECTION, SOLUTION INTRAVENOUS ONCE
Status: COMPLETED | OUTPATIENT
Start: 2025-04-03 | End: 2025-04-03

## 2025-04-03 RX ORDER — AMLODIPINE BESYLATE 5 MG/1
5 TABLET ORAL DAILY
Qty: 30 TABLET | Refills: 0 | Status: CANCELLED | OUTPATIENT
Start: 2025-04-03 | End: 2025-05-03

## 2025-04-03 RX ADMIN — ASPIRIN 81 MG: 81 TABLET, COATED ORAL at 08:35

## 2025-04-03 RX ADMIN — CARVEDILOL 12.5 MG: 12.5 TABLET, FILM COATED ORAL at 08:35

## 2025-04-03 RX ADMIN — MAGNESIUM SULFATE HEPTAHYDRATE 2000 MG: 40 INJECTION, SOLUTION INTRAVENOUS at 10:51

## 2025-04-03 RX ADMIN — METHYLPREDNISOLONE SODIUM SUCCINATE 60 MG: 125 INJECTION, POWDER, LYOPHILIZED, FOR SOLUTION INTRAMUSCULAR; INTRAVENOUS at 18:03

## 2025-04-03 RX ADMIN — SODIUM CHLORIDE, PRESERVATIVE FREE 10 ML: 5 INJECTION INTRAVENOUS at 21:24

## 2025-04-03 RX ADMIN — METHYLPREDNISOLONE SODIUM SUCCINATE 60 MG: 125 INJECTION, POWDER, LYOPHILIZED, FOR SOLUTION INTRAMUSCULAR; INTRAVENOUS at 10:50

## 2025-04-03 RX ADMIN — ATORVASTATIN CALCIUM 40 MG: 40 TABLET, FILM COATED ORAL at 21:22

## 2025-04-03 RX ADMIN — ACETAMINOPHEN 650 MG: 325 TABLET ORAL at 21:29

## 2025-04-03 RX ADMIN — CARVEDILOL 12.5 MG: 12.5 TABLET, FILM COATED ORAL at 21:22

## 2025-04-03 RX ADMIN — ISOSORBIDE MONONITRATE 60 MG: 60 TABLET, EXTENDED RELEASE ORAL at 08:35

## 2025-04-03 RX ADMIN — AMLODIPINE BESYLATE 5 MG: 5 TABLET ORAL at 21:22

## 2025-04-03 RX ADMIN — ENOXAPARIN SODIUM 40 MG: 100 INJECTION SUBCUTANEOUS at 08:35

## 2025-04-03 RX ADMIN — CLOPIDOGREL BISULFATE 75 MG: 75 TABLET, FILM COATED ORAL at 08:35

## 2025-04-03 ASSESSMENT — PAIN SCALES - GENERAL
PAINLEVEL_OUTOF10: 6
PAINLEVEL_OUTOF10: 5
PAINLEVEL_OUTOF10: 4

## 2025-04-03 ASSESSMENT — PAIN DESCRIPTION - LOCATION: LOCATION: HEAD

## 2025-04-03 NOTE — PROGRESS NOTES
Occupational Therapy Initial Evaluation  Facility/Department: [facility] 4B STEPDOWN   Patient Name: Tracey Alarcon        MRN: [id number]    : 1940    Date of Service: 4/3/2025    No chief complaint on file.    Past Medical History:  has a past medical history of Arthritis, Borderline diabetic, Bronchitis, CAD (coronary artery disease), Class 3 severe obesity without serious comorbidity with body mass index (BMI) of 45.0 to 49.9 in adult, Constipation, GERD (gastroesophageal reflux disease), Hyperlipidemia, Hypertension, Incisional hernia, Irregular heart beat, Mass of soft palate, Prolonged emergence from general anesthesia, Seasonal allergies, Short of breath on exertion, Sleep apnea, SOB (shortness of breath), TIA (transient ischemic attack), Under care of team, Wears glasses, and Wellness examination.  Past Surgical History:  has a past surgical history that includes Knee arthroscopy (Left); Neck surgery (); Rotator cuff repair (Right); Cholecystectomy; Carpal tunnel release (Bilateral); Hysterectomy (); ventral hernia repair (N/A, 2019); Eye surgery (Right); Colonoscopy (N/A, 2021); and other surgical history (2025).    Discharge Recommendations   Further therapy recommended at discharge.         Assessment  Performance deficits / Impairments: Decreased functional mobility ;Decreased high-level IADLs;Decreased endurance;Decreased ADL status;Decreased balance;Decreased ROM;Decreased strength;Decreased coordination  Assessment: pt demonstrated above deficits impacting occupational performance. pt would benefit from continued acute OT, as well as at discharge in order to increase safety and independence.  Prognosis: Good  Decision Making: Medium Complexity  REQUIRES OT FOLLOW-UP: Yes  Activity Tolerance  Activity Tolerance: Patient Tolerated treatment well  Safety Devices  Type of Devices: Call light within reach;Left in bed;Gait belt;Nurse notified;Bed alarm in place;Patient at risk for  for Homemaking: Needs assistance  Homemaking Responsibilities: Yes  Prior Level of Assist for Ambulation: Independent household ambulator, with or without device;Independent community ambulator, with or without device  Prior Level of Assist for Transfers: Independent  Active : Yes  Mode of Transportation: SUV  Occupation: Retired  Type of Occupation: [employer]  Leisure & Hobbies: Knitting  Additional Comments: Pt reports family is able to provide prn assist upon discharge.    Vision/Hearing  Vision  Vision: Impaired  Vision Exceptions: Wears glasses at all times  Hearing  Hearing: Within functional limits    BUE Assessment  Gross Assessment  AROM: Generally decreased, functional (R shoulder 0-40, AAROM 0-100. L shoulder 0-90)  Strength: Generally decreased, functional (L  4+/5, R  3-/5)  Coordination: Generally decreased, functional (pt exhibited decreased speed and accuracy to R UE impacting fine and gross motor coordination)  Tone: Normal  Sensation: Intact  Hand Dominance: Right     Objective  Orientation  Overall Orientation Status: Within Functional Limits  Cognition  Overall Cognitive Status: Exceptions  Problem Solving: Decreased awareness of errors;Assistance required to correct errors made  Insights: Decreased awareness of deficits  Initiation: Requires cues for some  Sequencing: Requires cues for some    Activities of Daily Living  Feeding: Modified independent   Grooming: Minimal assistance  UE Bathing: Minimal assistance  LE Bathing: Moderate assistance  UE Dressing: Minimal assistance  LE Dressing: Moderate assistance  Toileting: Moderate assistance  All ADLs completed during session discussed above, otherwise clinical reasoning/judgement utilized for all other assist levels.    Balance  Balance  Sitting: Intact (~10 minutes on eOB)  Standing: With support (~3 minutes. pt completed static standing at bedside and functiona lmobility with RW and min A.)    Transfers/Mobility  Bed  yes

## 2025-04-03 NOTE — DISCHARGE INSTRUCTIONS
You were seen here for headache, you were also found to have very high blood pressure that required IV medications to control,   Continue your home medications coreg 12.5 twice daily, we have restarted amlodipine 5 mg daily and imdur 30 mg daily to help control blood pressure better,  Repeat CTA in 1 week  Holter monitor outpatient  Follow up with neuroloendovascular surgery on discharge   Make an appointment with your primary care doctor in 1 week    If you begin to experience any symptoms such as chest pain shortness of breath nausea vomiting dizziness drowsiness abdominal pain loss of consciousness or any other symptoms you find concerning please come to the ED for follow-up evaluation.    If you have been given medication please take them as prescribed. Do not take more medication than recommended at any given time.     Please follow-up with your primary care provider within 5 to 7 days for continued care.     Please feel free return to the hospital if your symptoms worsen or any new concerning symptoms develop.  Follow-up with your primary care physician as needed for all other the concerns.

## 2025-04-03 NOTE — PROGRESS NOTES
Symmetrical palate  XI    -     Symmetrical shoulder shrug  XII   -     Midline tongue, no atrophy    MOTOR FUNCTION: RUE: decreased strength in RUE 4/5 proximal and distal muscle groups   LUE: Significant for good strength of grade 5/5 in proximal and distal muscle groups   RLE: Significant for good strength of grade 5/5 in proximal and distal muscle groups   LLE: Significant for good strength of grade 5/5 in proximal and distal muscle groups      Normal bulk, normal tone and no involuntary movements, no tremor   SENSORY FUNCTION:  Normal touch,    CEREBELLAR FUNCTION:  Intact fine motor control over upper limbs and lower limbs   REFLEX FUNCTION:  Symmetric in upper and lower extremities, no Babinski sign   STATION and GAIT Deferred        Investigations:      Laboratory Testing:  Recent Results (from the past 24 hours)   Venous Blood Gas, POC    Collection Time: 04/02/25 12:33 PM   Result Value Ref Range    pH, Andre 7.400 7.320 - 7.430    pCO2, Andre 44.9 41.0 - 51.0 mm Hg    PO2, Andre 30.0 30.0 - 50.0 mm Hg    HCO3, Venous 27.8 22.0 - 29.0 mmol/L    Positive Base Excess, Andre 2.4 0.0 - 3.0 mmol/L    O2 Sat, Andre 56.9 (L) 60.0 - 85.0 %   ELECTROLYTES PLUS    Collection Time: 04/02/25 12:33 PM   Result Value Ref Range    POC Sodium 143 138 - 146 mmol/L    POC Potassium 4.0 3.5 - 4.5 mmol/L    POC Chloride 101 98 - 107 mmol/L    POC TCO2 27 22 - 30 mmol/L    POC Anion Gap 16 7 - 16 mmol/L   Hemoglobin and hematocrit, blood    Collection Time: 04/02/25 12:33 PM   Result Value Ref Range    POC Hemoglobin (calc) 14.6 12.0 - 16.0 g/dL    POC Hematocrit 43 36 - 46 %   Creatinine W/GFR Point of Care    Collection Time: 04/02/25 12:33 PM   Result Value Ref Range    POC Creatinine 0.7 0.51 - 1.19 mg/dL    eGFR, POC 86 >60 mL/min/1.73m2   CALCIUM, IONIC (POC)    Collection Time: 04/02/25 12:33 PM   Result Value Ref Range    POC Ionized Calcium 1.24 1.15 - 1.33 mmol/L   POCT urea (BUN)    Collection Time: 04/02/25 12:33 PM      Hemoglobin A1C   Date Value Ref Range Status   04/26/2024 6.2 (H) 4.0 - 6.0 % Final       Assessment :      Primary Problem  Hypertensive urgency    Active Hospital Problems    Diagnosis Date Noted    Hypertensive urgency [I16.0] 04/02/2025    Hypertensive emergency [I16.1] 04/02/2025         Plan:     Tracey Alarcon is an 84 year old female with a history of treated left ICA aneurysm and chronic mild right-sided weakness, presents with worsening left-sided headache and severe hypertension following recent elective aneurysm treatment. Imaging showed no acute findings. .   She is admitted for neurologic monitoring and BP management, with headache improving with blood pressure improvement.     Plan:  Continue with dual antiplatelet therapy  Atorvastatin 40 mg nightly  Resume home Coreg 12.5 mg twice daily, resume Imdur 60 mg daily.  Patient was recently taken off of Norvasc 5 mg daily on 3/7/2025, will resume  Tylenol as needed for headache management  Pt/ot evaluation       @[id number]@      Follow-up further recommendations after discussing case with the attending.  The plan was discussed with the patient, patient's family and the medical staff.   Consultations:   IP CONSULT TO INTERNAL MEDICINE    Patient is admitted as inpatient status because of co-morbidities listed above, severity of signs and symptoms as outlined, requirement for current medical therapies and most importantly because of direct risk to patient if care not provided in a hospital setting.    Freddy Sarah MD  Internal Medicine Resident, PGY-2  [facility]; [place], [place]  4/3/2025, 9:49 AM      Copy sent to Aura Wagner MD

## 2025-04-03 NOTE — PLAN OF CARE
Problem: Discharge Planning  Goal: Discharge to home or other facility with appropriate resources  4/3/2025 0709 by Reny Soto RN  Outcome: Progressing  4/2/2025 2205 by Tad Strickland RN  Outcome: Progressing     Problem: Safety - Adult  Goal: Free from fall injury  4/3/2025 0709 by Reny Soto RN  Outcome: Progressing  4/2/2025 2205 by Tad Strickland RN  Outcome: Progressing     Problem: ABCDS Injury Assessment  Goal: Absence of physical injury  4/3/2025 0709 by Reny Soto RN  Outcome: Progressing  4/2/2025 2205 by Tad Strickland RN  Outcome: Progressing

## 2025-04-03 NOTE — PROGRESS NOTES
Physical Therapy  Facility/Department: [address] STEPDOWN   Physical Therapy Initial Evaluation    Patient Name: Tracey Alarcon        MRN: [id number]    : 1940    Date of Service: 4/3/2025    \"The patient is an 84 year old female with a history of an unruptured left ICA aneurysm (treated with pipeline embolization device 25), HTN, HLD, CAD, LAMIN (CPAP) and previous cholecystectomy who presented to [facility] on 3/31/25 for elective treatment of known residual unruptured left cavernous ICA aneurysm.  Cerebral aneurysm was found incidentally during work up for staring spells with associated confusion and difficulty speaking.  MRI Brain without contrast and EEG were unremarkable.  CTA Head/Neck with contrast showed a 3x56 dorsally projecting left cavernous segment ICA aneurysm.  She was referred to the Neuro Endovascular clinic and was evaluated by Dr. Braden.  In 2025 she underwent cerebral angiogram with treatment of the left ICA cavernous aneurysm with pipeline shield flow diverter device.  She was discharged home on Aspirin and Plavix.  Underwent DSA on 3/27/25 showing previously treated left cavernous ICA aneurysm with pipeline shield flow diverter device is filling achieving a Bhavin Adalberto score 3, distal end of the pipeline device foreshortened and covers 50% of aneurysm neck.  She was subsequently planned for repeat treatment. She was just discharged from the Neuro ICU 2 days ago.  Since discharge, she has had a persistent, sharp, nonpositional left-sided headache without nausea, vomiting, or visual symptoms. She also notes R sided weakness. Presented markedly hypertensive. Asked IM assistance with BP management. 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

## 2025-04-03 NOTE — PROGRESS NOTES
[facility]  Internal Medicine Teaching Residency Program  Inpatient Daily Progress Note  ______________________________________________________________________________    Patient: Tracey Alarcon  YOB: 1940   MRN:[id number]    Acct: [id number]     [facility]  Admit date: 4/2/2025  Today's date: 04/03/25  Number of days in the hospital: 1    SUBJECTIVE   Admitting Diagnosis: Hypertensive urgency  CC: No chief complaint on file.     Vitals:    04/03/25 0315   BP: 126/63   Pulse: 58   Resp: 19   Temp: 97.2 °F (36.2 °C)   SpO2: 96%     BP is stable, patient states her headache has resolved. Continue patient on current medications Coreg, norvasc, and Imdur. Labetalol as needed.  Pt examined at bedside. Chart & results reviewed.     No intake or output data in the 24 hours ending 04/03/25 0718   Review of Systems   Constitutional:  Negative for activity change, appetite change and fever.   HENT:  Negative for congestion, postnasal drip and rhinorrhea.    Eyes:  Negative for photophobia and discharge.   Respiratory:  Negative for chest tightness, shortness of breath and stridor.    Cardiovascular:  Negative for chest pain, palpitations and leg swelling.   Gastrointestinal:  Negative for abdominal distention, abdominal pain, blood in stool, constipation, diarrhea and nausea.   Endocrine: Negative for cold intolerance and heat intolerance.   Genitourinary:  Negative for difficulty urinating, dysuria, enuresis and flank pain.   Musculoskeletal:  Negative for arthralgias, gait problem, joint swelling and myalgias.   Skin:  Negative for color change and wound.   Neurological:  Positive for headaches. Negative for seizures and light-headedness.   Hematological:  Does not bruise/bleed easily.   Psychiatric/Behavioral:  Negative for agitation, behavioral problems and decreased concentration.          BRIEF HISTORY     The patient is a pleasant 84 y.o. female  stent is well apposed with no significant intimal hyperplasia --No other evidence of vasculitis, occlusion, aneurysm, vascular malformation, arterial dissection, stenosis, or veno-occlusive disease. Americo Braden MD Electronically signed by Americo Braden      ASSESSMENT & PLAN     ASSESSMENT / PLAN:   Unruptured Left ICA aneurysm s/p pipeline embolization  device on 1/6/2025  CAD  LAMIN  Hypertensive emergency  Patient started on Cardene with Goal -160, headaches improved with BP improvement  Resume home medications Norvasc, Coreg, and Imdur  Labetalol PRN  Rest of the management per primary         Plan for today:  Diet: ADULT DIET; Regular   PT/OT: Consulted  Discharge Planning/SW:  consult for assistance with discharge planning.  Tanner Stratton MD   Internal Medicine Resident   [facility],  [place], [place].    7:18 AM 4/3/2025     Please note that part of this chart was generated using voice recognition dictation software. Although every effort was made to ensure the accuracy of this automated transcription, some errors in transcription may have occurred.

## 2025-04-04 LAB
ANION GAP SERPL CALCULATED.3IONS-SCNC: 15 MMOL/L (ref 9–16)
BASOPHILS # BLD: <0.03 K/UL (ref 0–0.2)
BASOPHILS NFR BLD: 0 % (ref 0–2)
BUN SERPL-MCNC: 14 MG/DL (ref 8–23)
CALCIUM SERPL-MCNC: 9.1 MG/DL (ref 8.6–10.4)
CHLORIDE SERPL-SCNC: 101 MMOL/L (ref 98–107)
CHOLEST SERPL-MCNC: 149 MG/DL (ref 0–199)
CHOLESTEROL/HDL RATIO: 2.4
CO2 SERPL-SCNC: 19 MMOL/L (ref 20–31)
CREAT SERPL-MCNC: 0.5 MG/DL (ref 0.6–0.9)
EKG ATRIAL RATE: 69 BPM
EKG P AXIS: 24 DEGREES
EKG P-R INTERVAL: 190 MS
EKG Q-T INTERVAL: 400 MS
EKG QRS DURATION: 88 MS
EKG QTC CALCULATION (BAZETT): 428 MS
EKG R AXIS: -32 DEGREES
EKG T AXIS: 21 DEGREES
EKG VENTRICULAR RATE: 69 BPM
EOSINOPHIL # BLD: <0.03 K/UL (ref 0–0.44)
EOSINOPHILS RELATIVE PERCENT: 0 % (ref 1–4)
ERYTHROCYTE [DISTWIDTH] IN BLOOD BY AUTOMATED COUNT: 12.8 % (ref 11.8–14.4)
EST. AVERAGE GLUCOSE BLD GHB EST-MCNC: 114 MG/DL
GFR, ESTIMATED: >90 ML/MIN/1.73M2
GLUCOSE SERPL-MCNC: 174 MG/DL (ref 74–99)
HBA1C MFR BLD: 5.6 % (ref 4–6)
HCT VFR BLD AUTO: 42.7 % (ref 36.3–47.1)
HDLC SERPL-MCNC: 62 MG/DL
HGB BLD-MCNC: 13.4 G/DL (ref 11.9–15.1)
IMM GRANULOCYTES # BLD AUTO: 0.06 K/UL (ref 0–0.3)
IMM GRANULOCYTES NFR BLD: 1 %
LDLC SERPL CALC-MCNC: 75 MG/DL (ref 0–100)
LYMPHOCYTES NFR BLD: 1.33 K/UL (ref 1.1–3.7)
LYMPHOCYTES RELATIVE PERCENT: 14 % (ref 24–43)
MCH RBC QN AUTO: 29.3 PG (ref 25.2–33.5)
MCHC RBC AUTO-ENTMCNC: 31.4 G/DL (ref 28.4–34.8)
MCV RBC AUTO: 93.2 FL (ref 82.6–102.9)
MONOCYTES NFR BLD: 0.4 K/UL (ref 0.1–1.2)
MONOCYTES NFR BLD: 4 % (ref 3–12)
NEUTROPHILS NFR BLD: 81 % (ref 36–65)
NEUTS SEG NFR BLD: 7.61 K/UL (ref 1.5–8.1)
NRBC BLD-RTO: 0 PER 100 WBC
PLATELET # BLD AUTO: 257 K/UL (ref 138–453)
PMV BLD AUTO: 9.8 FL (ref 8.1–13.5)
POTASSIUM SERPL-SCNC: 3.7 MMOL/L (ref 3.7–5.3)
RBC # BLD AUTO: 4.58 M/UL (ref 3.95–5.11)
SODIUM SERPL-SCNC: 135 MMOL/L (ref 136–145)
TRIGL SERPL-MCNC: 59 MG/DL
VLDLC SERPL CALC-MCNC: 12 MG/DL (ref 1–30)
WBC OTHER # BLD: 9.4 K/UL (ref 3.5–11.3)

## 2025-04-04 PROCEDURE — 99232 SBSQ HOSP IP/OBS MODERATE 35: CPT | Performed by: PSYCHIATRY & NEUROLOGY

## 2025-04-04 PROCEDURE — 97112 NEUROMUSCULAR REEDUCATION: CPT

## 2025-04-04 PROCEDURE — 97116 GAIT TRAINING THERAPY: CPT

## 2025-04-04 PROCEDURE — 97535 SELF CARE MNGMENT TRAINING: CPT

## 2025-04-04 PROCEDURE — 6370000000 HC RX 637 (ALT 250 FOR IP): Performed by: PSYCHIATRY & NEUROLOGY

## 2025-04-04 PROCEDURE — 99223 1ST HOSP IP/OBS HIGH 75: CPT | Performed by: PHYSICAL MEDICINE & REHABILITATION

## 2025-04-04 PROCEDURE — 83036 HEMOGLOBIN GLYCOSYLATED A1C: CPT

## 2025-04-04 PROCEDURE — 2060000000 HC ICU INTERMEDIATE R&B

## 2025-04-04 PROCEDURE — 2500000003 HC RX 250 WO HCPCS: Performed by: PSYCHIATRY & NEUROLOGY

## 2025-04-04 PROCEDURE — 99232 SBSQ HOSP IP/OBS MODERATE 35: CPT | Performed by: INTERNAL MEDICINE

## 2025-04-04 PROCEDURE — 93010 ELECTROCARDIOGRAM REPORT: CPT | Performed by: STUDENT IN AN ORGANIZED HEALTH CARE EDUCATION/TRAINING PROGRAM

## 2025-04-04 PROCEDURE — 36415 COLL VENOUS BLD VENIPUNCTURE: CPT

## 2025-04-04 PROCEDURE — 85025 COMPLETE CBC W/AUTO DIFF WBC: CPT

## 2025-04-04 PROCEDURE — 97530 THERAPEUTIC ACTIVITIES: CPT

## 2025-04-04 PROCEDURE — 6360000002 HC RX W HCPCS

## 2025-04-04 PROCEDURE — 80048 BASIC METABOLIC PNL TOTAL CA: CPT

## 2025-04-04 PROCEDURE — 2500000003 HC RX 250 WO HCPCS

## 2025-04-04 PROCEDURE — 80061 LIPID PANEL: CPT

## 2025-04-04 PROCEDURE — 99233 SBSQ HOSP IP/OBS HIGH 50: CPT | Performed by: PSYCHIATRY & NEUROLOGY

## 2025-04-04 PROCEDURE — 6360000002 HC RX W HCPCS: Performed by: PSYCHIATRY & NEUROLOGY

## 2025-04-04 RX ORDER — LABETALOL HYDROCHLORIDE 5 MG/ML
10 INJECTION, SOLUTION INTRAVENOUS
Status: DISCONTINUED | OUTPATIENT
Start: 2025-04-04 | End: 2025-04-04 | Stop reason: SDUPTHER

## 2025-04-04 RX ORDER — ONDANSETRON 4 MG/1
4 TABLET, ORALLY DISINTEGRATING ORAL EVERY 8 HOURS PRN
Status: DISCONTINUED | OUTPATIENT
Start: 2025-04-04 | End: 2025-04-07 | Stop reason: HOSPADM

## 2025-04-04 RX ORDER — POLYETHYLENE GLYCOL 3350 17 G/17G
17 POWDER, FOR SOLUTION ORAL DAILY PRN
Status: DISCONTINUED | OUTPATIENT
Start: 2025-04-04 | End: 2025-04-07 | Stop reason: HOSPADM

## 2025-04-04 RX ORDER — ONDANSETRON 2 MG/ML
4 INJECTION INTRAMUSCULAR; INTRAVENOUS EVERY 6 HOURS PRN
Status: DISCONTINUED | OUTPATIENT
Start: 2025-04-04 | End: 2025-04-07 | Stop reason: HOSPADM

## 2025-04-04 RX ORDER — LABETALOL HYDROCHLORIDE 5 MG/ML
10 INJECTION, SOLUTION INTRAVENOUS
Status: DISCONTINUED | OUTPATIENT
Start: 2025-04-04 | End: 2025-04-05

## 2025-04-04 RX ADMIN — METHYLPREDNISOLONE SODIUM SUCCINATE 60 MG: 125 INJECTION, POWDER, LYOPHILIZED, FOR SOLUTION INTRAMUSCULAR; INTRAVENOUS at 02:49

## 2025-04-04 RX ADMIN — ATORVASTATIN CALCIUM 40 MG: 40 TABLET, FILM COATED ORAL at 20:58

## 2025-04-04 RX ADMIN — ASPIRIN 81 MG: 81 TABLET, COATED ORAL at 08:41

## 2025-04-04 RX ADMIN — SODIUM CHLORIDE, PRESERVATIVE FREE 10 ML: 5 INJECTION INTRAVENOUS at 08:42

## 2025-04-04 RX ADMIN — SODIUM CHLORIDE, PRESERVATIVE FREE 10 ML: 5 INJECTION INTRAVENOUS at 20:58

## 2025-04-04 RX ADMIN — ACETAMINOPHEN 650 MG: 325 TABLET ORAL at 21:06

## 2025-04-04 RX ADMIN — CLOPIDOGREL BISULFATE 75 MG: 75 TABLET, FILM COATED ORAL at 08:41

## 2025-04-04 RX ADMIN — ENOXAPARIN SODIUM 40 MG: 100 INJECTION SUBCUTANEOUS at 08:42

## 2025-04-04 RX ADMIN — ACETAMINOPHEN 650 MG: 325 TABLET ORAL at 11:25

## 2025-04-04 RX ADMIN — Medication 10 MG: at 04:00

## 2025-04-04 ASSESSMENT — PAIN SCALES - GENERAL
PAINLEVEL_OUTOF10: 0
PAINLEVEL_OUTOF10: 7
PAINLEVEL_OUTOF10: 5
PAINLEVEL_OUTOF10: 4
PAINLEVEL_OUTOF10: 7

## 2025-04-04 ASSESSMENT — PAIN DESCRIPTION - ORIENTATION: ORIENTATION: MID

## 2025-04-04 ASSESSMENT — PAIN - FUNCTIONAL ASSESSMENT
PAIN_FUNCTIONAL_ASSESSMENT: PREVENTS OR INTERFERES SOME ACTIVE ACTIVITIES AND ADLS
PAIN_FUNCTIONAL_ASSESSMENT: ACTIVITIES ARE NOT PREVENTED

## 2025-04-04 ASSESSMENT — PAIN DESCRIPTION - DESCRIPTORS
DESCRIPTORS: ACHING
DESCRIPTORS: ACHING

## 2025-04-04 ASSESSMENT — PAIN DESCRIPTION - PAIN TYPE: TYPE: ACUTE PAIN

## 2025-04-04 ASSESSMENT — PAIN SCALES - WONG BAKER: WONGBAKER_NUMERICALRESPONSE: NO HURT

## 2025-04-04 ASSESSMENT — PAIN DESCRIPTION - LOCATION
LOCATION: HEAD
LOCATION: HEAD

## 2025-04-04 NOTE — CARE COORDINATION
Met with patient to assess support and complete PHQ-9.  Patient stated she lives alone and was independent prior to admission.  Patient indicated her 2 sons, daughter and neighbors are supportive and available to assist her as needed.    Patient denied feelings of depression.  Patient denied suicidal or homicidal thoughts.    According to patient she will go to her daughters house for a few days when discharged.      Patient Health Questionnaire-9 (PHQ-9)    Over the last 2 weeks, how often have you been bothered by any of the following problems?    1. Little Interest or pleasure in doing things?   [x] Not at all  [] Several Days  [] More than half the day  []  Nearly every day    2. Feeling down, depressed or hopeless?    [x] Not at all  [] Several Days  [] More than half the day  []  Nearly every day    3. Trouble falling or staying asleep, or sleeping too much?   [x] Not at all  [] Several Days  [] More than half the day  []  Nearly every day    4. Feeling tired or having little energy?   [x] Not at all  [] Several Days  [] More than half the day  []  Nearly every day    5. Poor apettite or overeating?   [x] Not at all  [] Several Days  [] More than half the day  []  Nearly every day    6. Feeling bad about yourself-or that you are a failure or have let yourself or your family down?   [x] Not at all  [] Several Days  [] More than half the day  []  Nearly every day    7. Trouble concentrating on things, such as reading the newspaper or watching television?   [x] Not at all  [] Several Days  [] More than half the day  []  Nearly every day    8. Moving or speaking so slowly that other people could have noticed? Or the opposite-being so fidgety or restless that you have been moving around a lot more than usual?   [x] Not at all  [] Several Days  [] More than half the day  []  Nearly every day    9. Thoughts that you would be better off dead or of hurting yourself in some way?   [x] Not at all  [] Several Days  []  More than half the day  []  Nearly every day    Total Score: 0    If you checked off any problems, how difficult have these problems made it for you to do your work, take care of things at home, or get along with other people?   [] Not difficult at all  [] Somewhat Difficult  [] Very Difficult  []  Extremely Difficult

## 2025-04-04 NOTE — PROGRESS NOTES
Occupational Therapy  Occupational Therapy Daily Treatment Note  Facility/Department: [facility] 4B STEPDOWN   Patient Name: Tarcey Alarcon        MRN: [id number]    : 1940    Date of Service: 2025    No chief complaint on file.    Past Medical History:  has a past medical history of Arthritis, Borderline diabetic, Bronchitis, CAD (coronary artery disease), Class 3 severe obesity without serious comorbidity with body mass index (BMI) of 45.0 to 49.9 in adult, Constipation, GERD (gastroesophageal reflux disease), Hyperlipidemia, Hypertension, Incisional hernia, Irregular heart beat, Mass of soft palate, Prolonged emergence from general anesthesia, Seasonal allergies, Short of breath on exertion, Sleep apnea, SOB (shortness of breath), TIA (transient ischemic attack), Under care of team, Wears glasses, and Wellness examination.  Past Surgical History:  has a past surgical history that includes Knee arthroscopy (Left); Neck surgery (); Rotator cuff repair (Right); Cholecystectomy; Carpal tunnel release (Bilateral); Hysterectomy (); ventral hernia repair (N/A, 2019); Eye surgery (Right); Colonoscopy (N/A, 2021); and other surgical history (2025).    Discharge Recommendations  Discharge Recommendations: Patient would benefit from continued therapy after discharge The patient should be able to tolerate at least 3 hours of therapy per day over 5 days or 15 hours over 7 days.   This patient may benefit from a Physical Medicine and Rehab consult.         Assessment  Performance deficits / Impairments: Decreased functional mobility ;Decreased high-level IADLs;Decreased endurance;Decreased ADL status;Decreased balance;Decreased ROM;Decreased strength;Decreased coordination  Prognosis: Good  Activity Tolerance  Activity Tolerance: Patient Tolerated treatment well  Safety Devices  Type of Devices: Gait belt;Nurse notified;Call light within reach;Left in chair  Restraints  Restraints Initially in  functional tasks    Plan  Occupational Therapy Plan  Times Per Week: 3-5x/wk  Current Treatment Recommendations: Strengthening, ROM, Balance training, Functional mobility training, Endurance training, Safety education & training, Self-Care / ADL, Patient/Caregiver education & training, Equipment evaluation, education, & procurement, Home management training, Coordination training    Minutes  OT Individual Minutes  Time In: 1519  Time Out: 1614  Minutes: 55  Time Code Minutes   Timed Code Treatment Minutes: 53 Minutes    Electronically signed by KRYSTIAN Lay on 4/4/25 at 4:40 PM EDT

## 2025-04-04 NOTE — PLAN OF CARE
Problem: Discharge Planning  Goal: Discharge to home or other facility with appropriate resources  4/4/2025 0714 by Reny Soto RN  Outcome: Progressing  4/3/2025 2311 by Ivette Winters RN  Outcome: Progressing     Problem: Safety - Adult  Goal: Free from fall injury  4/4/2025 0714 by Reny Soto RN  Outcome: Progressing  4/3/2025 2311 by Ivette Winters RN  Outcome: Progressing     Problem: ABCDS Injury Assessment  Goal: Absence of physical injury  4/4/2025 0714 by Reny Soto RN  Outcome: Progressing  4/3/2025 2311 by Ivette Winters RN  Outcome: Progressing

## 2025-04-04 NOTE — PROGRESS NOTES
[facility]  Internal Medicine Teaching Residency Program  Inpatient Daily Progress Note  ______________________________________________________________________________    Patient: Tracey Alarcon  YOB: 1940   MRN:[id number]    Acct: [id number]     Room: [facility]0431-01  Admit date: 4/2/2025  Today's date: 04/04/25  Number of days in the hospital: 2    SUBJECTIVE   Admitting Diagnosis: Hypertensive urgency  CC: No chief complaint on file.     Vitals:    04/04/25 0600   BP: (!) 167/79   Pulse: 78   Resp: 20   Temp:    SpO2: 94%     BP is stable, patient states her headache has resolved. Continue patient on current medications Coreg, norvasc, and Imdur. Labetalol as needed.  Pt examined at bedside. Chart & results reviewed.       Intake/Output Summary (Last 24 hours) at 4/4/2025 0744  Last data filed at 4/4/2025 0644  Gross per 24 hour   Intake 50 ml   Output 800 ml   Net -750 ml      Review of Systems   Constitutional:  Negative for activity change, appetite change and fever.   HENT:  Negative for congestion, postnasal drip and rhinorrhea.    Eyes:  Negative for photophobia and discharge.   Respiratory:  Negative for chest tightness, shortness of breath and stridor.    Cardiovascular:  Negative for chest pain, palpitations and leg swelling.   Gastrointestinal:  Negative for abdominal distention, abdominal pain, blood in stool, constipation, diarrhea and nausea.   Endocrine: Negative for cold intolerance and heat intolerance.   Genitourinary:  Negative for difficulty urinating, dysuria, enuresis and flank pain.   Musculoskeletal:  Negative for arthralgias, gait problem, joint swelling and myalgias.   Skin:  Negative for color change and wound.   Neurological:  Positive for headaches. Negative for seizures and light-headedness.   Hematological:  Does not bruise/bleed easily.   Psychiatric/Behavioral:  Negative for agitation, behavioral problems and decreased  significant intimal hyperplasia --No other evidence of vasculitis, occlusion, aneurysm, vascular malformation, arterial dissection, stenosis, or veno-occlusive disease. 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  Tanner Stratton MD   Internal Medicine Resident   [place], [place].    7:44 AM 4/4/2025     Please note that part of this chart was generated using voice recognition dictation software. Although every effort was made to ensure the accuracy of this automated transcription, some errors in transcription may have occurred.

## 2025-04-04 NOTE — PROGRESS NOTES
Physical Therapy  Facility/Department: [address] STEPDOWN   Physical Therapy Daily Treatment Note    Patient Name: Tracey Alarcon        MRN: [id number]    : 1940    Date of Service: 2025    No chief complaint on file.    Past Medical History:  has a past medical history of Arthritis, Borderline diabetic, Bronchitis, CAD (coronary artery disease), Class 3 severe obesity without serious comorbidity with body mass index (BMI) of 45.0 to 49.9 in adult, Constipation, GERD (gastroesophageal reflux disease), Hyperlipidemia, Hypertension, Incisional hernia, Irregular heart beat, Mass of soft palate, Prolonged emergence from general anesthesia, Seasonal allergies, Short of breath on exertion, Sleep apnea, SOB (shortness of breath), TIA (transient ischemic attack), Under care of team, Wears glasses, and Wellness examination.  Past Surgical History:  has a past surgical history that includes Knee arthroscopy (Left); Neck surgery (); Rotator cuff repair (Right); Cholecystectomy; Carpal tunnel release (Bilateral); Hysterectomy (); ventral hernia repair (N/A, 2019); Eye surgery (Right); Colonoscopy (N/A, 2021); and other surgical history (2025).    Discharge Recommendations  Discharge Recommendations: Patient would benefit from continued therapy after discharge  PT Equipment Recommendations  Equipment Needed: No (Patient reports owning RW)    Assessment  Body Structures, Functions, Activity Limitations Requiring Skilled Therapeutic Intervention: Decreased functional mobility ;Decreased strength;Increased pain;Decreased posture;Decreased ROM;Decreased endurance;Decreased balance;Decreased coordination  Assessment: Patient completed bed mobility with SBA. Transfers completed with use of RW, CGA. Patient ambulated with use of RW, CGA, 100ft. Patient with mild SOB following ambulation, good recovery with seated rest break. Patient demo deficits in endurance and strength and would benefit from  & training, Therapeutic activities, Functional mobility training, Transfer training, Gait training, Stair training, Equipment evaluation, education, & procurement, Home exercise program    Goals  Patient Goals   Patient Goals : To get answers  Short Term Goals  Time Frame for Short Term Goals: Pt to perform bed mobility independently  Short Term Goal 1: Pt to demonstrate functional transfers independently  Short Term Goal 2: Pt to ambulate 100ft w/ least restrictive AD independently  Short Term Goal 3: Ascend/descend 3 stairs with bilateral rails SBA    Minutes  PT Individual Minutes  Time In: 0932  Time Out: 0959  Minutes: 27  Time Code Minutes  Timed Code Treatment Minutes: 27 Minutes    Electronically signed by Cristy Palomo PTA on 4/4/25 at 2:49 PM EDT

## 2025-04-04 NOTE — PROGRESS NOTES
[facility] Neurology   IN-PATIENT SERVICE   [facility]    Progress Note             Date:   4/4/2025  Patient name:  Tracey Alarcon  Date of admission:  4/2/2025 12:20 PM  MRN:   [id number]  Account:  [id number]  YOB: 1940  PCP:    Aura Rosado MD  Room:   [address] [place] [address]  Code Status:    Full Code    Chief Complaint:       Headache     Interval hx:     The patient was seen and examined at bedside. Is vitally stable, alert and oriented x 3. No acute events overnight.  The patient stated that her headache is better   Am labs reviewed     Mri showing - multiple acute infarctions in left MCA and right PICA         Brief History of Present Illness:     Tracey Alarcon is an 84-year-old female with a history of an unruptured left ICA aneurysm (treated with a pipeline embolization device on 1/6/25), hypertension, hyperlipidemia, CAD, LAMIN (on CPAP), and prior cholecystectomy. She was recently admitted to [facility] on 3/31/25 for elective treatment of a residual unruptured left cavernous ICA aneurysm and discharged in stable condition on 4/1/25. Since discharge, she has had a persistent, sharp, nonpositional left-sided headache without nausea, vomiting, or visual symptoms.     She now presents with worsening of the same headache headache. On arrival, her SBP was 229 and glucose 101. She was evaluated as a stroke alert due to chronic right-sided weakness. Her NIH stroke scale was 3; mild facial droop and mild drift in the right arm and leg--consistent with her baseline. She denied any new or worsening focal symptoms. CT head was negative for acute findings, and CTA of the head and neck showed expected post-procedure changes, confirmed by the neuroendovascular team. She is admitted for close neurologic monitoring/headache management and blood pressure control.  On reevaluation, patient reports significant improvement in headache coinciding with improvement in blood  MG tablet Take 1 tablet by mouth daily 3/28/25  Yes Kehinde Olson MD   carvedilol (COREG) 12.5 MG tablet Take 1 tablet by mouth 2 times daily 3/7/25 9/3/25 Yes Arua Rosado MD   aspirin 81 MG EC tablet Take 1 tablet by mouth daily 1/31/25  Yes Aura Rosado MD   b complex vitamins capsule Take 1 capsule by mouth daily 1/31/25  Yes Aura Rosado MD   simvastatin (ZOCOR) 40 MG tablet TAKE 1 TABLET BY MOUTH EVERY DAY 1/31/25  Yes Aura Rosado MD   isosorbide mononitrate (IMDUR) 60 MG extended release tablet TAKE 1 TABLET BY MOUTH EVERY DAY 1/31/25  Yes Aura Rosado MD   esomeprazole (NEXIUM 24HR) 20 MG delayed release capsule Take 1 capsule by mouth every morning (before breakfast) 1/31/25  Yes Aura Rosado MD   ZINC PO Take 1 tablet by mouth daily   Yes ProviderNelson MD   vitamin D (CHOLECALCIFEROL) 25 MCG (1000 UT) TABS tablet Take 1 tablet by mouth daily   Yes ProviderNelson MD   Multiple Vitamins-Minerals (HAIR SKIN AND NAILS FORMULA PO) Take 2 tablets by mouth daily   Yes Nelson Olivas MD   amLODIPine (NORVASC) 5 MG tablet Take 1 tablet by mouth nightly for 10 days 1/31/25 3/31/25  Aura Rosado MD   nitroGLYCERIN (NITROSTAT) 0.4 MG SL tablet Place 1 tablet under the tongue every 5 minutes as needed for Chest pain up to max of 3 total doses. If no relief after 1 dose, call 911.  Patient not taking: Reported on 3/7/2025 1/31/25   Aura Rosado MD   fexofenadine (ALLEGRA) 180 MG tablet Take 1 tablet by mouth daily as needed (allergies) 1/31/25   Aura Rosado MD   [employer] PURELAX 17 GM/SCOOP powder Take 17 g by mouth daily as needed 5/26/22   ProviderNelson MD        Allergies:     Medrol [methylprednisolone]    Social History:     Tobacco:    reports that she has never smoked. She has been exposed to tobacco smoke. She has never used smokeless tobacco.  Alcohol:      reports no history of alcohol use.  Drug Use:

## 2025-04-04 NOTE — CARE COORDINATION
Case Management   Daily Progress Note       Patient Name: Tracey Alarcon                   YOB: 1940  Diagnosis: Hypertensive urgency [I16.0]  Hypertensive emergency [I16.1]                       GMLOS: 2.1 days  Length of Stay: 2  days    Anticipated Discharge Date: One day until discharge    Readmission Risk (Low < 19, Mod (19-27), High > 27): Readmission Risk Score: 17        Current Transitional Plan    [x] Home Independently    [] Home with HC    [] Skilled Nursing Facility    [] Acute Rehabilitation    [] Long Term Acute Care (LTAC)    [] Other:     Plan for the Stay (Medical Management) :          Workflow Continuation (Additional Notes) :  Met with the patient to discuss referrals to [facility]PR per MD recommendation. Patient states she will not go to IPR, states she will go [facility] patient to [facility] near her home. Discussed increase weakness due to the medical condition. States she will discharge to her daughters home for a couple of days - they will help her. States she still drives. Informed her  the doctor may not want her driving again until after she has completed therapy. States her daughter will be in later - writer may discuss the transition plan with her then. Writer to call daughter if she is not her by 1500.    1530 Met with the patient and her daughter. States she will go to IPR, list provided.                     Post Acute Facility/Agency List     Provided patient with the following list, the list includes the overall star ratings obtained from CMS per the Medicare Web site (www.Medicare.gov):     [] Long Term Acute Care Facilities  [x] Acute Inpatient Rehabilitation Facilities  [] Skilled Nursing Facilities  [] Hospice Facilities  [] Home Care    Provided verbal instructions on how to utilize the QR Code to obtain additional detailed star ratings from www.Medicare.gov     offered to print and provide the detailed list:    []Accepted   [x]Declined    ANGEL AGUDELO  April  4, 2025

## 2025-04-04 NOTE — CONSULTS
Physical Medicine & Rehabilitation  Consult Note      Admitting Physician:   Deepali Marlow DO    Primary Care Provider:   Aura Rosado MD     Reason for Consult:  Acute Inpatient Rehabilitation    Chief Complaint: severe headache    History of Present Illness:  Referring Provider is requesting an evaluation for appropriate placement upon discharge from acute care. History from chart review and patient, son.    Tracey Alarcon is a 84 y.o. RHD female admitted to [facility] on 4/2/2025.      Patient admitted 3/31/25 for elective treatment of residual unruptured L cavernous ICA aneurysm and discharged home 4/1/25. She had persistent sharp L sided headache afterward and returned to ED with  mm Hg. Initial NIHSS 3. CT head was negative.     Internal medicine managing medical comorbidities including HTN.     Review of Systems:  Constitutional: negative for anorexia, chills, fatigue, fevers, sweats and weight loss  Eyes: negative for redness and visual disturbance  Ears, nose, mouth, throat, and face: negative for earaches, sore throat and tinnitus  Respiratory: negative for cough and shortness of breath  Cardiovascular: negative for chest pain, dyspnea and palpitations  Gastrointestinal: negative for abdominal pain, change in bowel habits, constipation, nausea and vomiting  Genitourinary:negative for dysuria, frequency, hesitancy and urinary incontinence  Integument/breast: negative for pruritus and rash  Musculoskeletal:negative for stiff joints  Neurological: negative for dizziness, positive for headaches   Behavioral/Psych: negative for decreased appetite, depression        Premorbid function:  Independent    Current function:  Restrictions/ Precautions-  Restrictions/Precautions  Restrictions/Precautions: Fall Risk  Activity Level: Up as Tolerated  Required Braces or Orthoses?: No    PT:    Bed Mobility-  Bed mobility  Supine to Sit: Stand by assistance  Sit to Supine: Stand by assistance  Scooting:  No   Physical Activity: Sufficiently Active (9/16/2024)    Exercise Vital Sign     Days of Exercise per Week: 3 days     Minutes of Exercise per Session: 90 min   Stress: No Stress Concern Present (3/22/2021)    [employer] - Occupational Stress Questionnaire     Feeling of Stress : Not at all   Social Connections: Moderately Integrated (3/22/2021)    Social Connection and Isolation Panel [NHANES]     Frequency of Communication with Friends and Family: More than three times a week     Frequency of Social Gatherings with Friends and Family: Once a week     Attends [employer] Services: 1 to 4 times per year     Active Member of Clubs or Organizations: Yes     Attends Club or Organization Meetings: 1 to 4 times per year     Marital Status:    Intimate Partner Violence: Not At Risk (3/22/2021)    Humiliation, Afraid, Rape, and Kick questionnaire     Fear of Current or Ex-Partner: No     Emotionally Abused: No     Physically Abused: No     Sexually Abused: No   Housing Stability: Low Risk  (4/2/2025)    Housing Stability Vital Sign     Unable to Pay for Housing in the Last Year: No     Number of Times Moved in the Last Year: 0     Homeless in the Last Year: No         Family History:       Problem Relation Age of Onset    Heart Disease Mother     Diabetes Mother     Heart Failure Mother     Heart Attack Father        Diagnostics:    CBC:   Recent Labs     04/02/25  1235 04/04/25  0811   WBC 10.5 9.4   RBC 4.25 4.58   HGB 12.7 13.4   HCT 39.2 42.7   MCV 92.2 93.2   RDW 13.5 12.8    257     BMP:    Recent Labs     04/02/25  1233 04/02/25  1235 04/04/25  0811   GLUCOSE  --  101* 174*   BUN  --  15 14   CREATININE 0.7 0.8 0.5*   CALCIUM  --  9.3 9.1   NA  --  137 135*   K  --  4.0 3.7   CL  --  103 101   CO2  --  23 19*   ANIONGAP  --  11 15   LABGLOM  --  73 >90     HbA1c:   Lab Results   Component Value Date    LABA1C 5.6 04/04/2025     BNP: No results for input(s): \"BNP\" in the

## 2025-04-05 LAB
ANION GAP SERPL CALCULATED.3IONS-SCNC: 14 MMOL/L (ref 9–16)
BUN SERPL-MCNC: 20 MG/DL (ref 8–23)
CALCIUM SERPL-MCNC: 8.9 MG/DL (ref 8.6–10.4)
CHLORIDE SERPL-SCNC: 104 MMOL/L (ref 98–107)
CO2 SERPL-SCNC: 18 MMOL/L (ref 20–31)
CREAT SERPL-MCNC: 0.6 MG/DL (ref 0.6–0.9)
GFR, ESTIMATED: 89 ML/MIN/1.73M2
GLUCOSE SERPL-MCNC: 108 MG/DL (ref 74–99)
POTASSIUM SERPL-SCNC: 3.8 MMOL/L (ref 3.7–5.3)
SODIUM SERPL-SCNC: 136 MMOL/L (ref 136–145)

## 2025-04-05 PROCEDURE — 2060000000 HC ICU INTERMEDIATE R&B

## 2025-04-05 PROCEDURE — 97530 THERAPEUTIC ACTIVITIES: CPT

## 2025-04-05 PROCEDURE — 99232 SBSQ HOSP IP/OBS MODERATE 35: CPT | Performed by: PSYCHIATRY & NEUROLOGY

## 2025-04-05 PROCEDURE — 6370000000 HC RX 637 (ALT 250 FOR IP)

## 2025-04-05 PROCEDURE — 80048 BASIC METABOLIC PNL TOTAL CA: CPT

## 2025-04-05 PROCEDURE — 99232 SBSQ HOSP IP/OBS MODERATE 35: CPT | Performed by: INTERNAL MEDICINE

## 2025-04-05 PROCEDURE — 6370000000 HC RX 637 (ALT 250 FOR IP): Performed by: PSYCHIATRY & NEUROLOGY

## 2025-04-05 PROCEDURE — 97116 GAIT TRAINING THERAPY: CPT

## 2025-04-05 PROCEDURE — 97535 SELF CARE MNGMENT TRAINING: CPT

## 2025-04-05 PROCEDURE — 97110 THERAPEUTIC EXERCISES: CPT

## 2025-04-05 PROCEDURE — 6360000002 HC RX W HCPCS: Performed by: PSYCHIATRY & NEUROLOGY

## 2025-04-05 PROCEDURE — 36415 COLL VENOUS BLD VENIPUNCTURE: CPT

## 2025-04-05 PROCEDURE — 97112 NEUROMUSCULAR REEDUCATION: CPT

## 2025-04-05 PROCEDURE — 2500000003 HC RX 250 WO HCPCS: Performed by: PSYCHIATRY & NEUROLOGY

## 2025-04-05 RX ORDER — LABETALOL HYDROCHLORIDE 5 MG/ML
5 INJECTION, SOLUTION INTRAVENOUS EVERY 6 HOURS PRN
Status: DISCONTINUED | OUTPATIENT
Start: 2025-04-05 | End: 2025-04-06

## 2025-04-05 RX ADMIN — ACETAMINOPHEN 650 MG: 325 TABLET ORAL at 09:23

## 2025-04-05 RX ADMIN — AMLODIPINE BESYLATE 5 MG: 5 TABLET ORAL at 19:50

## 2025-04-05 RX ADMIN — SODIUM CHLORIDE, PRESERVATIVE FREE 10 ML: 5 INJECTION INTRAVENOUS at 07:42

## 2025-04-05 RX ADMIN — ENOXAPARIN SODIUM 40 MG: 100 INJECTION SUBCUTANEOUS at 07:43

## 2025-04-05 RX ADMIN — CARVEDILOL 12.5 MG: 12.5 TABLET, FILM COATED ORAL at 09:22

## 2025-04-05 RX ADMIN — ATORVASTATIN CALCIUM 40 MG: 40 TABLET, FILM COATED ORAL at 19:50

## 2025-04-05 RX ADMIN — ASPIRIN 81 MG: 81 TABLET, COATED ORAL at 07:42

## 2025-04-05 RX ADMIN — SODIUM CHLORIDE, PRESERVATIVE FREE 10 ML: 5 INJECTION INTRAVENOUS at 19:50

## 2025-04-05 RX ADMIN — CLOPIDOGREL BISULFATE 75 MG: 75 TABLET, FILM COATED ORAL at 07:42

## 2025-04-05 RX ADMIN — CARVEDILOL 12.5 MG: 12.5 TABLET, FILM COATED ORAL at 19:50

## 2025-04-05 ASSESSMENT — PAIN DESCRIPTION - LOCATION: LOCATION: HEAD

## 2025-04-05 ASSESSMENT — PAIN SCALES - GENERAL
PAINLEVEL_OUTOF10: 3
PAINLEVEL_OUTOF10: 4
PAINLEVEL_OUTOF10: 0

## 2025-04-05 ASSESSMENT — PAIN DESCRIPTION - DESCRIPTORS: DESCRIPTORS: ACHING

## 2025-04-05 ASSESSMENT — PAIN - FUNCTIONAL ASSESSMENT: PAIN_FUNCTIONAL_ASSESSMENT: PREVENTS OR INTERFERES SOME ACTIVE ACTIVITIES AND ADLS

## 2025-04-05 ASSESSMENT — PAIN DESCRIPTION - ORIENTATION: ORIENTATION: INNER

## 2025-04-05 ASSESSMENT — PAIN SCALES - WONG BAKER: WONGBAKER_NUMERICALRESPONSE: NO HURT

## 2025-04-05 NOTE — PROGRESS NOTES
-     Normal facial sensation   VII    -     Normal facial symmetry  VIII   -     Intact hearing   IX,X -     Symmetrical palate  XI    -     Symmetrical shoulder shrug  XII   -     Midline tongue, no atrophy    MOTOR FUNCTION: RUE:  strength in RUE 5/5 proximal and distal muscle groups   LUE: Significant for good strength of grade 5/5 in proximal and distal muscle groups   RLE: Significant for good strength of grade 5/5 in proximal and distal muscle groups   LLE: Significant for good strength of grade 5/5 in proximal and distal muscle groups      Normal bulk, normal tone and no involuntary movements, no tremor   SENSORY FUNCTION:  Normal touch,    CEREBELLAR FUNCTION:  Intact fine motor control over upper limbs and lower limbs   REFLEX FUNCTION:  Symmetric in upper and lower extremities, no Babinski sign   STATION and GAIT Deferred      4/3/25 MRI  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        Investigations:      Laboratory Testing:  Recent Results (from the past 24 hours)   Basic Metabolic Panel w/ Reflex to MG    Collection Time: 04/05/25  3:34 AM   Result Value Ref Range    Sodium 136 136 - 145 mmol/L    Potassium 3.8 3.7 - 5.3 mmol/L    Chloride 104 98 - 107 mmol/L    CO2 18 (L) 20 - 31 mmol/L    Anion Gap 14 9 - 16 mmol/L    Glucose 108 (H) 74 - 99 mg/dL    BUN 20 8 - 23 mg/dL    Creatinine 0.6 0.6 - 0.9 mg/dL    Est, Glom Filt Rate 89 >60 mL/min/1.73m2    Calcium 8.9 8.6 - 10.4 mg/dL       Recent Labs     04/04/25  0811   WBC 9.4   RBC 4.58   HGB 13.4   HCT 42.7   MCV 93.2   MCH 29.3   MCHC 31.4   RDW 12.8      MPV 9.8     Recent Labs     04/05/25  0334      K 3.8      CO2 18*   BUN 20   CREATININE 0.6   GLUCOSE 108*   CALCIUM 8.9     Hemoglobin A1C   Date Value Ref Range Status   04/04/2025 5.6 4.0 - 6.0 % Final       Assessment :      Primary Problem  Hypertensive urgency    Active Hospital Problems    Diagnosis Date Noted    Hypertensive  urgency [I16.0] 04/02/2025    Hypertensive emergency [I16.1] 04/02/2025         Plan:     Tracey Alarcon is an 84 year old female with a history of treated left ICA aneurysm and chronic mild right-sided weakness, presents with worsening left-sided headache and severe hypertension following recent elective aneurysm treatment. Imaging showed no acute findings. .   She is admitted for neurologic monitoring and BP management, with headache improving with blood pressure improvement. Due to persistent right sided weakness underwent MRI brain found to have acute infarcts in left mca and right pica     Acute ischemic stroke   Left MCA and right PICA   Continue aspirin, plavix ,statin   -blood pressure goal <140   -neuro checks   -echo ordered to rule out cardioembolic cause   -pt/ot     Left ica cavernous aneurysm   S/p pipeline shield flow diverter 4/1   Presented with headache   CT head was negative for acute findings, and CTA of the head and neck showed dissection along the L ICA and severe stenosis.   NEV consulted emergently in the ED.   Continue DAPT and statin therapy   - endovascular follow up in clinic     Hypertension   IM on board to help manage uncontrolled hypertension   Amlodipine and coreg resumed   Blood pressure goal <140     LAMIN   Continue cpap nightly     @[id number]@      Follow-up further recommendations after discussing case with the attending.  The plan was discussed with the patient, patient's family and the medical staff.   Consultations:   IP CONSULT TO INTERNAL MEDICINE  IP CONSULT TO PHYSICAL MEDICINE REHAB    Patient is admitted as inpatient status because of co-morbidities listed above, severity of signs and symptoms as outlined, requirement for current medical therapies and most importantly because of direct risk to patient if care not provided in a hospital setting.    Freddy Sarah MD  Internal Medicine Resident, PGY-2  [facility]; [place]

## 2025-04-05 NOTE — PROGRESS NOTES
Occupational Therapy  Occupational Therapy Daily Treatment Note  Facility/Department: [facility] 4B STEPDOWN   Patient Name: Tracey Alarcon        MRN: [id number]    : 1940    Date of Service: 2025    No chief complaint on file.    Past Medical History:  has a past medical history of Arthritis, Borderline diabetic, Bronchitis, CAD (coronary artery disease), Class 3 severe obesity without serious comorbidity with body mass index (BMI) of 45.0 to 49.9 in adult, Constipation, GERD (gastroesophageal reflux disease), Hyperlipidemia, Hypertension, Incisional hernia, Irregular heart beat, Mass of soft palate, Prolonged emergence from general anesthesia, Seasonal allergies, Short of breath on exertion, Sleep apnea, SOB (shortness of breath), TIA (transient ischemic attack), Under care of team, Wears glasses, and Wellness examination.  Past Surgical History:  has a past surgical history that includes Knee arthroscopy (Left); Neck surgery (); Rotator cuff repair (Right); Cholecystectomy; Carpal tunnel release (Bilateral); Hysterectomy (); ventral hernia repair (N/A, 2019); Eye surgery (Right); Colonoscopy (N/A, 2021); and other surgical history (2025).    Discharge Recommendations  Discharge Recommendations: Patient would benefit from continued therapy after discharge       Assessment  Performance deficits / Impairments: Decreased functional mobility ;Decreased high-level IADLs;Decreased endurance;Decreased ADL status;Decreased balance;Decreased ROM;Decreased strength;Decreased coordination  Assessment: pt demonstrated above deficits impacting occupational performance. pt would benefit from continued acute OT, as well as at discharge in order to increase safety and independence.  Prognosis: Good  Activity Tolerance  Activity Tolerance: Patient Tolerated treatment well  Safety Devices  Type of Devices: Gait belt;Nurse notified;Call light within reach;Left in chair    AM-PAC  AM-PAC Daily Activity -  and strength  Short Term Goal 5: dem SBA during functional transfers/functional mobility with LRAD, as needed  Short Term Goal 6: dem ~8 minutes dynamic standing tolerance with SBA in order tocomplete functional tasks  Short Term Goal 7: participate in fine motor activity for 15+ minutes in order to increase R hand coordination    Plan  Occupational Therapy Plan  Times Per Week: 3-5x/wk  Current Treatment Recommendations: Strengthening, ROM, Balance training, Functional mobility training, Endurance training, Safety education & training, Self-Care / ADL, Patient/Caregiver education & training, Equipment evaluation, education, & procurement, Home management training, Coordination training    Minutes  OT Individual Minutes  Time In: 0955  Time Out: 1049  Minutes: 54  Time Code Minutes   Timed Code Treatment Minutes: 53 Minutes    Electronically signed by KRYSTIAN Lay on 4/5/25 at 11:17 AM EDT

## 2025-04-05 NOTE — PLAN OF CARE
Problem: Discharge Planning  Goal: Discharge to home or other facility with appropriate resources  4/5/2025 0719 by Reny Soto RN  Outcome: Progressing  4/5/2025 0153 by Rajwinder Arroyo RN  Outcome: Progressing  Flowsheets (Taken 4/4/2025 2040)  Discharge to home or other facility with appropriate resources: Arrange for needed discharge resources and transportation as appropriate     Problem: Safety - Adult  Goal: Free from fall injury  4/5/2025 0719 by Reny Soto RN  Outcome: Progressing  4/5/2025 0153 by Rajwinder Arroyo RN  Outcome: Progressing     Problem: ABCDS Injury Assessment  Goal: Absence of physical injury  4/5/2025 0719 by Reny Soto RN  Outcome: Progressing  4/5/2025 0153 by Rajwinder Arroyo RN  Outcome: Progressing     Problem: Pain  Goal: Verbalizes/displays adequate comfort level or baseline comfort level  4/5/2025 0719 by Reny Soto RN  Outcome: Progressing  4/5/2025 0153 by Rajwinder Arroyo RN  Outcome: Progressing

## 2025-04-05 NOTE — PROGRESS NOTES
[facility]  Internal Medicine Teaching Residency Program  Inpatient Daily Progress Note  ______________________________________________________________________________    Patient: Tracey Alarcon  YOB: 1940   MRN:[id number]    Acct: [id number]     Room: [facility]1/0431-01  Admit date: 4/2/2025  Today's date: 04/05/25  Number of days in the hospital: 3    SUBJECTIVE   Admitting Diagnosis: Hypertensive urgency  CC: No chief complaint on file.     Patient seen and examined at bedside. Chart and results reviewed  No acute events overnight  Afebrile, hemodynamically stable, saturating well on room air  Patient was HTN overnight, per neuro for permissive HTN, this AM her BP is slightly improved and her home meds have been unheld.  Did not require PRN antihypertensives overnight.  She is doing well without any complaints.     Review of Systems   Constitutional:  Negative for activity change, appetite change and fever.   HENT:  Negative for congestion, postnasal drip and rhinorrhea.    Eyes:  Negative for photophobia and discharge.   Respiratory:  Negative for chest tightness, shortness of breath and stridor.    Cardiovascular:  Negative for chest pain, palpitations and leg swelling.   Gastrointestinal:  Negative for abdominal distention, abdominal pain, blood in stool, constipation, diarrhea and nausea.   Endocrine: Negative for cold intolerance and heat intolerance.   Genitourinary:  Negative for difficulty urinating, dysuria, enuresis and flank pain.   Musculoskeletal:  Negative for arthralgias, gait problem, joint swelling and myalgias.   Skin:  Negative for color change and wound.   Neurological:  Positive for headaches. Negative for seizures and light-headedness.   Hematological:  Does not bruise/bleed easily.   Psychiatric/Behavioral:  Negative for agitation, behavioral problems and decreased concentration.          BRIEF HISTORY     The patient is a pleasant 84

## 2025-04-05 NOTE — PROGRESS NOTES
Endovascular Neurosurgery Note    Pt Name: Tracey Alarcon  MRN: [id number]  YOB: 1940  Date of evaluation: 4/5/2025  Primary Care Physician: Aura Rosado MD      SUBJECTIVE:     NAEO from EVN perspective. States headache is completely resolved today. No new complaints or focal neuro deficits.      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     She was admitted to [facility] for a workup due to fatigue and facial droop. During that workup, she had an MRI and EEG. The EEG showed no evidence of any strokes or seizures. She was discharged with further treatment for high blood pressure and on a baby aspirin     Due to persist encephalopathy after discharge she sought a second opinion from Dr. Joan Ramey in Neurology the following week, who suggested that her symptoms might be due to a urinary tract infection (UTI). After taking antibiotics three days, she regained her ability to speak and the staring spells ceased. She has a history of UTI, htn, hld     She experiences difficulty walking due to balance issues and fatigue but manages to move around her house without a cane, using furniture for support. For longer distances, she uses a rolling walker. She has been diagnosed with sleep apnea and uses a CPAP machine.     She also has a right shoulder issue, which she is managing with therapy and has decided against surgery. Additionally, she has claustrophobia for MRI imaging     She is n.p.o. and took her Plavix and aspirin last night.  Will give her her dose now and proceed with left ICA cavernous segment aneurysm embolization under general anesthesia    Interval history:   S/p recent DSA that showed persistent filling of the aneurysm Bhavin III with the previously placed Pipeline Shield device covering ~50% of the aneurysm neck. Risks/benefits of the second-stage treatment were discussed with the

## 2025-04-05 NOTE — PROGRESS NOTES
Endovascular Neurosurgery Note    Pt Name: Tracey Alarcon  MRN: [id number]  YOB: 1940  Date of evaluation: 4/5/2025  Primary Care Physician: Aura Rosado MD      SUBJECTIVE:     NAEO from EVN perspective. CT and CTA reviewed. Pt doing well, no new complaints or focal neuro deficits.       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     She was admitted to [facility] for a workup due to fatigue and facial droop. During that workup, she had an MRI and EEG. The EEG showed no evidence of any strokes or seizures. She was discharged with further treatment for high blood pressure and on a baby aspirin     Due to persist encephalopathy after discharge she sought a second opinion from Dr. Joan Ramey in Neurology the following week, who suggested that her symptoms might be due to a urinary tract infection (UTI). After taking antibiotics three days, she regained her ability to speak and the staring spells ceased. She has a history of UTI, htn, hld     She experiences difficulty walking due to balance issues and fatigue but manages to move around her house without a cane, using furniture for support. For longer distances, she uses a rolling walker. She has been diagnosed with sleep apnea and uses a CPAP machine.     She also has a right shoulder issue, which she is managing with therapy and has decided against surgery. Additionally, she has claustrophobia for MRI imaging     She is n.p.o. and took her Plavix and aspirin last night.  Will give her her dose now and proceed with left ICA cavernous segment aneurysm embolization under general anesthesia    Interval history:   S/p recent DSA that showed persistent filling of the aneurysm Bhavin III with the previously placed Pipeline Shield device covering ~50% of the aneurysm neck. Risks/benefits of the second-stage treatment were discussed with the patient who  symmetry  5A: Left arm motor drift --> 0 = No drift for 10 seconds  5B: Right arm motor drift --> 0 = No drift for 10 seconds  6A: Left leg motor drift --> 0 = No drift for 5 seconds  6B: Right leg motor drift --> 0 = No drift for 5 seconds  7: Limb Ataxia --> 0 = No ataxia  8: Sensation --> 0 = Normal; no sensory loss  9: Language/aphasia --> 0 = Normal; no aphasia  10: Dysarthria --> 0 = Normal  11: Extinction/inattention --> 0 = No abnormality    MRS 0      LABS:     Recent Labs     04/02/25  1235 04/04/25  0811 04/05/25  0334   WBC 10.5 9.4  --    HGB 12.7 13.4  --    HCT 39.2 42.7  --     257  --     135* 136   K 4.0 3.7 3.8    101 104   CO2 23 19* 18*   BUN 15 14 20   CREATININE 0.8 0.5* 0.6   CALCIUM 9.3 9.1 8.9   INR 1.0  --   --      No results for input(s): \"ALKPHOS\", \"ALT\", \"AST\", \"BILITOT\", \"BILIDIR\", \"LABALBU\", \"AMYLASE\", \"LIPASE\" in the last 72 hours.    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           Embolization January 6, 2025    Please see dictated Radiology note for further details  L ICA cavernous saccular aneurysm measuring 5 in width x 5.19 mm in height. .   The above lesion was treated with 6 [employer], Jobalineenstein diagnostic catheter and stiff glide wire.   Once the aneurysm surgically evaluated.  Phenom plus, phenom 27 and Synchro Support Pre-shaped were advanced coaxially across the aneurysm into the MCA/M1 horizontal segment.   The microwire was removed and a 5x20mm Pipeline Shield flow diverter device was advanced and the device was deployed across the aneurysm.  The \"catheter bumping\" technique was performed gently using the Phenom Plus and Phenom 27 catheters, followed by the \"micro-J wire\" technique to achieve improved apposition.  Final angiographic run demonstrated patent well apposed FD system across the aneurysm with contrast stagnation in the aneurysmal sac.

## 2025-04-05 NOTE — PLAN OF CARE
Problem: Discharge Planning  Goal: Discharge to home or other facility with appropriate resources  Outcome: Progressing  Flowsheets (Taken 4/4/2025 2040)  Discharge to home or other facility with appropriate resources: Arrange for needed discharge resources and transportation as appropriate     Problem: Safety - Adult  Goal: Free from fall injury  Outcome: Progressing     Problem: ABCDS Injury Assessment  Goal: Absence of physical injury  Outcome: Progressing     Problem: Pain  Goal: Verbalizes/displays adequate comfort level or baseline comfort level  Outcome: Progressing

## 2025-04-05 NOTE — PROGRESS NOTES
Physical Therapy  Facility/Department: [address] STEPDOWN   Physical Therapy Daily Treatment Note    Patient Name: Tracey Alarcon        MRN: [id number]    : 1940    Date of Service: 2025    No chief complaint on file.    Past Medical History:  has a past medical history of Arthritis, Borderline diabetic, Bronchitis, CAD (coronary artery disease), Class 3 severe obesity without serious comorbidity with body mass index (BMI) of 45.0 to 49.9 in adult, Constipation, GERD (gastroesophageal reflux disease), Hyperlipidemia, Hypertension, Incisional hernia, Irregular heart beat, Mass of soft palate, Prolonged emergence from general anesthesia, Seasonal allergies, Short of breath on exertion, Sleep apnea, SOB (shortness of breath), TIA (transient ischemic attack), Under care of team, Wears glasses, and Wellness examination.  Past Surgical History:  has a past surgical history that includes Knee arthroscopy (Left); Neck surgery (); Rotator cuff repair (Right); Cholecystectomy; Carpal tunnel release (Bilateral); Hysterectomy (); ventral hernia repair (N/A, 2019); Eye surgery (Right); Colonoscopy (N/A, 2021); and other surgical history (2025).    Discharge Recommendations  Discharge Recommendations: Patient would benefit from continued therapy after discharge  PT Equipment Recommendations  Equipment Needed: No (Patient reports owning RW)    Assessment     Assessment: pt amb with bariatric SW @ 65 ft. pt requires assist with amb. pt is very cooperative & sukumar therapy well. Pt requires occasional rest break. pt could benefit from cont therapy to improve her functional mobility. pt BP after therapy 130/61, MAP 77, HR 62. No c/o feeling dizzy, light headed or headache during therapy.  Requires PT Follow-Up: Yes  Activity Tolerance  Activity Tolerance Comments: amb 65 ft bariatric SW, 1 assist  Safety Devices  Type of Devices: Call light within reach;Bed alarm in place;Left in bed;Patient at risk for

## 2025-04-06 LAB
ANION GAP SERPL CALCULATED.3IONS-SCNC: 12 MMOL/L (ref 9–16)
BUN SERPL-MCNC: 23 MG/DL (ref 8–23)
CALCIUM SERPL-MCNC: 9.2 MG/DL (ref 8.6–10.4)
CHLORIDE SERPL-SCNC: 103 MMOL/L (ref 98–107)
CO2 SERPL-SCNC: 24 MMOL/L (ref 20–31)
CREAT SERPL-MCNC: 0.7 MG/DL (ref 0.6–0.9)
GFR, ESTIMATED: 86 ML/MIN/1.73M2
GLUCOSE SERPL-MCNC: 108 MG/DL (ref 74–99)
POTASSIUM SERPL-SCNC: 3.9 MMOL/L (ref 3.7–5.3)
SODIUM SERPL-SCNC: 139 MMOL/L (ref 136–145)

## 2025-04-06 PROCEDURE — 6370000000 HC RX 637 (ALT 250 FOR IP)

## 2025-04-06 PROCEDURE — 36415 COLL VENOUS BLD VENIPUNCTURE: CPT

## 2025-04-06 PROCEDURE — 2500000003 HC RX 250 WO HCPCS: Performed by: PSYCHIATRY & NEUROLOGY

## 2025-04-06 PROCEDURE — 6360000002 HC RX W HCPCS: Performed by: PSYCHIATRY & NEUROLOGY

## 2025-04-06 PROCEDURE — 6370000000 HC RX 637 (ALT 250 FOR IP): Performed by: PSYCHIATRY & NEUROLOGY

## 2025-04-06 PROCEDURE — 80048 BASIC METABOLIC PNL TOTAL CA: CPT

## 2025-04-06 PROCEDURE — 94761 N-INVAS EAR/PLS OXIMETRY MLT: CPT

## 2025-04-06 PROCEDURE — 99232 SBSQ HOSP IP/OBS MODERATE 35: CPT | Performed by: PSYCHIATRY & NEUROLOGY

## 2025-04-06 PROCEDURE — 2060000000 HC ICU INTERMEDIATE R&B

## 2025-04-06 RX ORDER — CALCIUM CARBONATE 500 MG/1
500 TABLET, CHEWABLE ORAL 3 TIMES DAILY PRN
Status: DISCONTINUED | OUTPATIENT
Start: 2025-04-06 | End: 2025-04-07 | Stop reason: HOSPADM

## 2025-04-06 RX ORDER — ISOSORBIDE MONONITRATE 30 MG/1
30 TABLET, EXTENDED RELEASE ORAL DAILY
Status: DISCONTINUED | OUTPATIENT
Start: 2025-04-07 | End: 2025-04-07 | Stop reason: HOSPADM

## 2025-04-06 RX ORDER — LABETALOL HYDROCHLORIDE 5 MG/ML
10 INJECTION, SOLUTION INTRAVENOUS EVERY 6 HOURS PRN
Status: DISCONTINUED | OUTPATIENT
Start: 2025-04-06 | End: 2025-04-07 | Stop reason: HOSPADM

## 2025-04-06 RX ORDER — HYDRALAZINE HYDROCHLORIDE 20 MG/ML
10 INJECTION INTRAMUSCULAR; INTRAVENOUS EVERY 6 HOURS PRN
Status: DISCONTINUED | OUTPATIENT
Start: 2025-04-06 | End: 2025-04-07 | Stop reason: HOSPADM

## 2025-04-06 RX ADMIN — CARVEDILOL 12.5 MG: 12.5 TABLET, FILM COATED ORAL at 21:13

## 2025-04-06 RX ADMIN — CLOPIDOGREL BISULFATE 75 MG: 75 TABLET, FILM COATED ORAL at 08:32

## 2025-04-06 RX ADMIN — SODIUM CHLORIDE, PRESERVATIVE FREE 10 ML: 5 INJECTION INTRAVENOUS at 08:32

## 2025-04-06 RX ADMIN — AMLODIPINE BESYLATE 5 MG: 5 TABLET ORAL at 21:13

## 2025-04-06 RX ADMIN — CARVEDILOL 12.5 MG: 12.5 TABLET, FILM COATED ORAL at 08:32

## 2025-04-06 RX ADMIN — SODIUM CHLORIDE, PRESERVATIVE FREE 10 ML: 5 INJECTION INTRAVENOUS at 21:13

## 2025-04-06 RX ADMIN — ENOXAPARIN SODIUM 40 MG: 100 INJECTION SUBCUTANEOUS at 08:32

## 2025-04-06 RX ADMIN — ATORVASTATIN CALCIUM 40 MG: 40 TABLET, FILM COATED ORAL at 21:13

## 2025-04-06 RX ADMIN — ACETAMINOPHEN 650 MG: 325 TABLET ORAL at 21:13

## 2025-04-06 RX ADMIN — ASPIRIN 81 MG: 81 TABLET, COATED ORAL at 08:32

## 2025-04-06 ASSESSMENT — PAIN SCALES - GENERAL
PAINLEVEL_OUTOF10: 5
PAINLEVEL_OUTOF10: 0

## 2025-04-06 ASSESSMENT — PAIN DESCRIPTION - LOCATION: LOCATION: HEAD

## 2025-04-06 ASSESSMENT — PAIN DESCRIPTION - ORIENTATION: ORIENTATION: MID

## 2025-04-06 NOTE — PLAN OF CARE
Problem: Discharge Planning  Goal: Discharge to home or other facility with appropriate resources  Outcome: Progressing  Flowsheets (Taken 4/5/2025 1953)  Discharge to home or other facility with appropriate resources: Arrange for needed discharge resources and transportation as appropriate     Problem: Safety - Adult  Goal: Free from fall injury  Outcome: Progressing     Problem: ABCDS Injury Assessment  Goal: Absence of physical injury  Outcome: Progressing     Problem: Pain  Goal: Verbalizes/displays adequate comfort level or baseline comfort level  Outcome: Progressing

## 2025-04-06 NOTE — PROGRESS NOTES
Endovascular Neurosurgery Note    Pt Name: Tracey Alarcon  MRN: [id number]  YOB: 1940  Date of evaluation: 4/6/2025  Primary Care Physician: Aura Rosado MD      SUBJECTIVE:     NAEO from EVN perspective. No further headaches. Able to use the R hand now to eat and hold objects. Able to walk without weakness or foot drop. Overall doing very well. No new focal neuro deficits.     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     She was admitted to [facility] for a workup due to fatigue and facial droop. During that workup, she had an MRI and EEG. The EEG showed no evidence of any strokes or seizures. She was discharged with further treatment for high blood pressure and on a baby aspirin     Due to persist encephalopathy after discharge she sought a second opinion from Dr. Joan Ramey in Neurology the following week, who suggested that her symptoms might be due to a urinary tract infection (UTI). After taking antibiotics three days, she regained her ability to speak and the staring spells ceased. She has a history of UTI, htn, hld     She experiences difficulty walking due to balance issues and fatigue but manages to move around her house without a cane, using furniture for support. For longer distances, she uses a rolling walker. She has been diagnosed with sleep apnea and uses a CPAP machine.     She also has a right shoulder issue, which she is managing with therapy and has decided against surgery. Additionally, she has claustrophobia for MRI imaging     She is n.p.o. and took her Plavix and aspirin last night.  Will give her her dose now and proceed with left ICA cavernous segment aneurysm embolization under general anesthesia    Interval history:   S/p recent DSA that showed persistent filling of the aneurysm Bhavin III with the previously placed Pipeline Shield device covering ~50% of  in the aneurysmal sac.                     Bhavin score: class III      DSA 3/27/25  Previously treated left cavernous ICA aneurysm with pipeline shield flow diverter device is filling achieving Bhavin Adalberto score 3.  The distal end of the pipeline shield device has foreshortened and covering 50% of the aneurysm neck.  The stent is well apposed with no significant intimal hyperplasia               Pipeline embolization 3/31/2025  Findings:  Please see dictated Radiology note for further details  Previously treated left cavernous ICA aneurysm with pipeline shield flow diverter device is filling achieving Bhavin Adalberto score 3.  The distal end of the pipeline shield device has foreshortened and covering 50% of the aneurysm neck.  The stent is well apposed with no significant intimal hyperplasia.   The above lesion was treated with 8f short sheath, Zoom support 088, Berenstein diagnostic catheter and stiff glide wire.   Once the aneurysm surgically evaluated.  AXS CAT 5 058,  XT 27 and Synchro Support Pre-shaped were advanced coaxially across the aneurysm into the MCA/M1 horizontal segment.   The microwire was removed and a 5x25mm Pipeline Shield flow diverter device was advanced. The system was pulled back to the     ICA terminus, and the device was deployed across the aneurysm.   Balloon angioplasty of the distal fishmouthing using scepter XC balloon 4 mm x 11 mm for better apposition  Final angiographic run demonstrated patent well apposed FD system across the aneurysm with contrast stagnation in the aneurysmal sac.                   4/2/25 CTA  1. No acute intracranial abnormality within constraints of CT acquisition.   2. 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   3. Severe in stent luminal narrowing of the left intracranial ICA stent.   4. Residual filling of the left cavernous ICA dorsally

## 2025-04-06 NOTE — CARE COORDINATION
Case Management   Daily Progress Note       Patient Name: Tracey Alarcon                   YOB: 1940  Diagnosis: Hypertensive urgency [I16.0]  Hypertensive emergency [I16.1]                       GMLOS: 2.8 days  Length of Stay: 4  days    Anticipated Discharge Date: Two or more days before discharge    Readmission Risk (Low < 19, Mod (19-27), High > 27): Readmission Risk Score: 15.9        Current Transitional Plan    [] Home Independently    [] Home with HC    [] Skilled Nursing Facility    [x] Acute Rehabilitation    [] Long Term Acute Care (LTAC)    [] Other:     Plan for the Stay (Medical Management) :          Workflow Continuation (Additional Notes) :    Met with the patient to discuss the transition plan. Patient requests to have a referral sent to [facility] - referral sent.      ANGEL AGUDELO  April 6, 2025

## 2025-04-06 NOTE — PLAN OF CARE
Problem: Discharge Planning  Goal: Discharge to home or other facility with appropriate resources  4/6/2025 0713 by Reny Soto RN  Outcome: Progressing  4/6/2025 0404 by Rajwinder Arroyo RN  Outcome: Progressing  Flowsheets (Taken 4/5/2025 1953)  Discharge to home or other facility with appropriate resources: Arrange for needed discharge resources and transportation as appropriate     Problem: Safety - Adult  Goal: Free from fall injury  4/6/2025 0713 by Reny Soto RN  Outcome: Progressing  4/6/2025 0404 by Rajwinder Arroyo RN  Outcome: Progressing     Problem: ABCDS Injury Assessment  Goal: Absence of physical injury  4/6/2025 0713 by Reny Soto RN  Outcome: Progressing  4/6/2025 0404 by Rajwinder Arroyo RN  Outcome: Progressing     Problem: Pain  Goal: Verbalizes/displays adequate comfort level or baseline comfort level  4/6/2025 0713 by Reny Soto RN  Outcome: Progressing  4/6/2025 0404 by Rajwinder Arroyo RN  Outcome: Progressing

## 2025-04-06 NOTE — PROGRESS NOTES
[facility] Neurology   IN-PATIENT SERVICE   [facility]    Progress Note             Date:   4/6/2025  Patient name:  Tracey Alarcon  Date of admission:  4/2/2025 12:20 PM  MRN:   [id number]  Account:  [id number]  YOB: 1940  PCP:    Aura Rosado MD  Room:   [address] [place] [address]  Code Status:    Full Code    Chief Complaint:     Headache     Interval hx:     The patient was seen and examined at bedside. Is vitally stable, alert and oriented x 3. No acute events overnight.   headache completely resolved, neuroexam unchanged.  Imdur continued to be on hold, will resume it today with new blood pressure goal 100-1 40.  PMR consulted    Brief History of Present Illness:     Tracey Alarcon is an 84-year-old female with a history of an unruptured left ICA aneurysm (treated with a pipeline embolization device on 1/6/25), hypertension, hyperlipidemia, CAD, LAMIN (on CPAP), and prior cholecystectomy. She was recently admitted to [facility] on 3/31/25 for elective treatment of a residual unruptured left cavernous ICA aneurysm and discharged in stable condition on 4/1/25. Since discharge, she has had a persistent, sharp, nonpositional left-sided headache without nausea, vomiting, or visual symptoms.     She now presents with worsening of the same headache headache. On arrival, her SBP was 229 and glucose 101. She was evaluated as a stroke alert due to chronic right-sided weakness. Her NIH stroke scale was 3; mild facial droop and mild drift in the right arm and leg--consistent with her baseline. She denied any new or worsening focal symptoms. CT head was negative for acute findings, and CTA of the head and neck showed expected post-procedure changes, confirmed by the neuroendovascular team. She is admitted for close neurologic monitoring/headache management and blood pressure control.  On reevaluation, patient reports significant improvement in headache coinciding with improvement in blood  palate  XI    -     Symmetrical shoulder shrug  XII   -     Midline tongue, no atrophy    MOTOR FUNCTION: RUE:  strength in RUE 5/5 proximal and distal muscle groups   LUE: Significant for good strength of grade 4+/5 in proximal and distal muscle groups   RLE: Significant for good strength of grade 4+/5 in proximal and distal muscle groups   LLE: Significant for good strength of grade 5/5 in proximal and distal muscle groups      Normal bulk, normal tone and no involuntary movements, no tremor   SENSORY FUNCTION:  Normal    CEREBELLAR FUNCTION:  Intact fine motor control over upper limbs and lower limbs   REFLEX FUNCTION:  Symmetric in upper and lower extremities, no Babinski sign   STATION and GAIT Deferred      4/3/25 MRI  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        Investigations:      Laboratory Testing:  Recent Results (from the past 24 hours)   Basic Metabolic Panel w/ Reflex to MG    Collection Time: 04/06/25  8:51 AM   Result Value Ref Range    Sodium 139 136 - 145 mmol/L    Potassium 3.9 3.7 - 5.3 mmol/L    Chloride 103 98 - 107 mmol/L    CO2 24 20 - 31 mmol/L    Anion Gap 12 9 - 16 mmol/L    Glucose 108 (H) 74 - 99 mg/dL    BUN 23 8 - 23 mg/dL    Creatinine 0.7 0.6 - 0.9 mg/dL    Est, Glom Filt Rate 86 >60 mL/min/1.73m2    Calcium 9.2 8.6 - 10.4 mg/dL       Recent Labs     04/04/25  0811   WBC 9.4   RBC 4.58   HGB 13.4   HCT 42.7   MCV 93.2   MCH 29.3   MCHC 31.4   RDW 12.8      MPV 9.8     Recent Labs     04/06/25  0851      K 3.9      CO2 24   BUN 23   CREATININE 0.7   GLUCOSE 108*   CALCIUM 9.2     Hemoglobin A1C   Date Value Ref Range Status   04/04/2025 5.6 4.0 - 6.0 % Final       Assessment :      Primary Problem  Hypertensive urgency    Active Hospital Problems    Diagnosis Date Noted    Hypertensive urgency [I16.0] 04/02/2025    Hypertensive emergency [I16.1] 04/02/2025         Plan:     Tracey Alarcon is an 84 year old female

## 2025-04-07 ENCOUNTER — APPOINTMENT (OUTPATIENT)
Age: 85
DRG: 064 | End: 2025-04-07
Payer: MEDICARE

## 2025-04-07 VITALS
SYSTOLIC BLOOD PRESSURE: 133 MMHG | HEIGHT: 60 IN | RESPIRATION RATE: 20 BRPM | BODY MASS INDEX: 43.19 KG/M2 | HEART RATE: 67 BPM | WEIGHT: 220 LBS | DIASTOLIC BLOOD PRESSURE: 68 MMHG | TEMPERATURE: 97.3 F | OXYGEN SATURATION: 100 %

## 2025-04-07 PROBLEM — I63.419 CEREBROVASCULAR ACCIDENT (CVA) DUE TO EMBOLISM OF MIDDLE CEREBRAL ARTERY (HCC): Status: ACTIVE | Noted: 2025-04-07

## 2025-04-07 LAB
ANION GAP SERPL CALCULATED.3IONS-SCNC: 11 MMOL/L (ref 9–16)
BUN SERPL-MCNC: 21 MG/DL (ref 8–23)
CALCIUM SERPL-MCNC: 8.7 MG/DL (ref 8.6–10.4)
CHLORIDE SERPL-SCNC: 104 MMOL/L (ref 98–107)
CO2 SERPL-SCNC: 22 MMOL/L (ref 20–31)
CREAT SERPL-MCNC: 0.7 MG/DL (ref 0.6–0.9)
ECHO AO ASC DIAM: 3.4 CM
ECHO AO ASCENDING AORTA INDEX: 1.75 CM/M2
ECHO AO ROOT DIAM: 3.8 CM
ECHO AO ROOT INDEX: 1.96 CM/M2
ECHO AV AREA PEAK VELOCITY: 1.7 CM2
ECHO AV AREA VTI: 2 CM2
ECHO AV AREA/BSA PEAK VELOCITY: 0.9 CM2/M2
ECHO AV AREA/BSA VTI: 1 CM2/M2
ECHO AV MEAN GRADIENT: 4 MMHG
ECHO AV MEAN VELOCITY: 0.9 M/S
ECHO AV PEAK GRADIENT: 9 MMHG
ECHO AV PEAK VELOCITY: 1.5 M/S
ECHO AV VELOCITY RATIO: 0.67
ECHO AV VTI: 27.7 CM
ECHO BSA: 2.06 M2
ECHO IVC PROX: 1.8 CM
ECHO LA DIAMETER INDEX: 1.55 CM/M2
ECHO LA DIAMETER: 3 CM
ECHO LA TO AORTIC ROOT RATIO: 0.79
ECHO LV E' LATERAL VELOCITY: 5.77 CM/S
ECHO LV E' SEPTAL VELOCITY: 4.79 CM/S
ECHO LV EF PHYSICIAN: 60 %
ECHO LV FRACTIONAL SHORTENING: 44 % (ref 28–44)
ECHO LV INTERNAL DIMENSION DIASTOLE INDEX: 2.32 CM/M2
ECHO LV INTERNAL DIMENSION DIASTOLIC: 4.5 CM (ref 3.9–5.3)
ECHO LV INTERNAL DIMENSION SYSTOLIC INDEX: 1.29 CM/M2
ECHO LV INTERNAL DIMENSION SYSTOLIC: 2.5 CM
ECHO LV IVSD: 1.1 CM (ref 0.6–0.9)
ECHO LV MASS 2D: 175 G (ref 67–162)
ECHO LV MASS INDEX 2D: 90.2 G/M2 (ref 43–95)
ECHO LV POSTERIOR WALL DIASTOLIC: 1.1 CM (ref 0.6–0.9)
ECHO LV RELATIVE WALL THICKNESS RATIO: 0.49
ECHO LVOT AREA: 2.5 CM2
ECHO LVOT AV VTI INDEX: 0.8
ECHO LVOT DIAM: 1.8 CM
ECHO LVOT MEAN GRADIENT: 2 MMHG
ECHO LVOT PEAK GRADIENT: 4 MMHG
ECHO LVOT PEAK VELOCITY: 1 M/S
ECHO LVOT STROKE VOLUME INDEX: 29.1 ML/M2
ECHO LVOT SV: 56.5 ML
ECHO LVOT VTI: 22.2 CM
ECHO MV A VELOCITY: 1.04 M/S
ECHO MV AREA VTI: 1.8 CM2
ECHO MV E DECELERATION TIME (DT): 309 MS
ECHO MV E VELOCITY: 0.68 M/S
ECHO MV E/A RATIO: 0.65
ECHO MV E/E' LATERAL: 11.79
ECHO MV E/E' RATIO (AVERAGED): 12.99
ECHO MV E/E' SEPTAL: 14.2
ECHO MV LVOT VTI INDEX: 1.38
ECHO MV MAX VELOCITY: 1.1 M/S
ECHO MV MEAN GRADIENT: 2 MMHG
ECHO MV MEAN VELOCITY: 0.6 M/S
ECHO MV PEAK GRADIENT: 4 MMHG
ECHO MV VTI: 30.6 CM
ECHO RV FREE WALL PEAK S': 14.6 CM/S
ECHO RV TAPSE: 2.5 CM (ref 1.7–?)
GFR, ESTIMATED: 86 ML/MIN/1.73M2
GLUCOSE SERPL-MCNC: 101 MG/DL (ref 74–99)
POTASSIUM SERPL-SCNC: 4.1 MMOL/L (ref 3.7–5.3)
SODIUM SERPL-SCNC: 137 MMOL/L (ref 136–145)

## 2025-04-07 PROCEDURE — 97530 THERAPEUTIC ACTIVITIES: CPT

## 2025-04-07 PROCEDURE — 2500000003 HC RX 250 WO HCPCS: Performed by: PSYCHIATRY & NEUROLOGY

## 2025-04-07 PROCEDURE — 97535 SELF CARE MNGMENT TRAINING: CPT

## 2025-04-07 PROCEDURE — 93306 TTE W/DOPPLER COMPLETE: CPT

## 2025-04-07 PROCEDURE — 93306 TTE W/DOPPLER COMPLETE: CPT | Performed by: INTERNAL MEDICINE

## 2025-04-07 PROCEDURE — 97110 THERAPEUTIC EXERCISES: CPT

## 2025-04-07 PROCEDURE — 6370000000 HC RX 637 (ALT 250 FOR IP)

## 2025-04-07 PROCEDURE — 97112 NEUROMUSCULAR REEDUCATION: CPT

## 2025-04-07 PROCEDURE — 97116 GAIT TRAINING THERAPY: CPT

## 2025-04-07 PROCEDURE — 36415 COLL VENOUS BLD VENIPUNCTURE: CPT

## 2025-04-07 PROCEDURE — 99232 SBSQ HOSP IP/OBS MODERATE 35: CPT | Performed by: PSYCHIATRY & NEUROLOGY

## 2025-04-07 PROCEDURE — 6370000000 HC RX 637 (ALT 250 FOR IP): Performed by: PSYCHIATRY & NEUROLOGY

## 2025-04-07 PROCEDURE — 80048 BASIC METABOLIC PNL TOTAL CA: CPT

## 2025-04-07 PROCEDURE — 6360000002 HC RX W HCPCS: Performed by: PSYCHIATRY & NEUROLOGY

## 2025-04-07 PROCEDURE — 99239 HOSP IP/OBS DSCHRG MGMT >30: CPT | Performed by: PSYCHIATRY & NEUROLOGY

## 2025-04-07 RX ORDER — ATORVASTATIN CALCIUM 40 MG/1
40 TABLET, FILM COATED ORAL NIGHTLY
Qty: 30 TABLET | Refills: 3 | Status: SHIPPED | OUTPATIENT
Start: 2025-04-07

## 2025-04-07 RX ORDER — AMLODIPINE BESYLATE 5 MG/1
5 TABLET ORAL NIGHTLY
Qty: 30 TABLET | Refills: 3 | Status: SHIPPED | OUTPATIENT
Start: 2025-04-07

## 2025-04-07 RX ORDER — ISOSORBIDE MONONITRATE 30 MG/1
30 TABLET, EXTENDED RELEASE ORAL DAILY
Qty: 30 TABLET | Refills: 3 | Status: SHIPPED | OUTPATIENT
Start: 2025-04-08

## 2025-04-07 RX ADMIN — ISOSORBIDE MONONITRATE 30 MG: 30 TABLET, EXTENDED RELEASE ORAL at 08:27

## 2025-04-07 RX ADMIN — SODIUM CHLORIDE, PRESERVATIVE FREE 10 ML: 5 INJECTION INTRAVENOUS at 08:32

## 2025-04-07 RX ADMIN — CARVEDILOL 12.5 MG: 12.5 TABLET, FILM COATED ORAL at 08:27

## 2025-04-07 RX ADMIN — ASPIRIN 81 MG: 81 TABLET, COATED ORAL at 08:27

## 2025-04-07 RX ADMIN — CLOPIDOGREL BISULFATE 75 MG: 75 TABLET, FILM COATED ORAL at 08:27

## 2025-04-07 RX ADMIN — ENOXAPARIN SODIUM 40 MG: 100 INJECTION SUBCUTANEOUS at 08:25

## 2025-04-07 ASSESSMENT — ENCOUNTER SYMPTOMS
SINUS PRESSURE: 0
DIARRHEA: 0
BACK PAIN: 0
ABDOMINAL PAIN: 0
COUGH: 0
TROUBLE SWALLOWING: 0
SHORTNESS OF BREATH: 0
CHEST TIGHTNESS: 0
SORE THROAT: 0

## 2025-04-07 NOTE — PROGRESS NOTES
Occupational Therapy  Occupational Therapy Daily Treatment Note  Facility/Department: [facility] 4B STEPDOWN   Patient Name: Tracey Alarcon        MRN: [id number]    : 1940    Date of Service: 2025    No chief complaint on file.    Past Medical History:  has a past medical history of Arthritis, Borderline diabetic, Bronchitis, CAD (coronary artery disease), Class 3 severe obesity without serious comorbidity with body mass index (BMI) of 45.0 to 49.9 in adult, Constipation, GERD (gastroesophageal reflux disease), Hyperlipidemia, Hypertension, Incisional hernia, Irregular heart beat, Mass of soft palate, Prolonged emergence from general anesthesia, Seasonal allergies, Short of breath on exertion, Sleep apnea, SOB (shortness of breath), TIA (transient ischemic attack), Under care of team, Wears glasses, and Wellness examination.  Past Surgical History:  has a past surgical history that includes Knee arthroscopy (Left); Neck surgery (); Rotator cuff repair (Right); Cholecystectomy; Carpal tunnel release (Bilateral); Hysterectomy (); ventral hernia repair (N/A, 2019); Eye surgery (Right); Colonoscopy (N/A, 2021); and other surgical history (2025).    Discharge Recommendations  Discharge Recommendations: Patient would benefit from continued therapy after discharge       Assessment  Performance deficits / Impairments: Decreased functional mobility ;Decreased high-level IADLs;Decreased endurance;Decreased ADL status;Decreased balance;Decreased ROM;Decreased strength;Decreased coordination  Prognosis: Good  Activity Tolerance  Activity Tolerance: Patient Tolerated treatment well;Patient limited by fatigue  Safety Devices  Type of Devices: Call light within reach;Gait belt;Nurse notified;Left in chair  Restraints  Restraints Initially in Place: No    AM-PAC  AM-PAC Daily Activity - Inpatient   How much help is needed for putting on and taking off regular lower body clothing?: A Lot  How much help

## 2025-04-07 NOTE — PROGRESS NOTES
Endovascular Neurosurgery Note    Pt Name: Tracey Alarcon  MRN: [id number]  YOB: 1940  Date of evaluation: 4/7/2025  Primary Care Physician: Aura Rosado MD      SUBJECTIVE:     NAEO from EVN perspective. No headaches, states R-side is nearly back to baseline except very slight finger dexterity weakness. No new focal neuro deficits. Working with PT/OT.    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     She was admitted to [facility] for a workup due to fatigue and facial droop. During that workup, she had an MRI and EEG. The EEG showed no evidence of any strokes or seizures. She was discharged with further treatment for high blood pressure and on a baby aspirin     Due to persist encephalopathy after discharge she sought a second opinion from Dr. Joan Ramey in Neurology the following week, who suggested that her symptoms might be due to a urinary tract infection (UTI). After taking antibiotics three days, she regained her ability to speak and the staring spells ceased. She has a history of UTI, htn, hld     She experiences difficulty walking due to balance issues and fatigue but manages to move around her house without a cane, using furniture for support. For longer distances, she uses a rolling walker. She has been diagnosed with sleep apnea and uses a CPAP machine.     She also has a right shoulder issue, which she is managing with therapy and has decided against surgery. Additionally, she has claustrophobia for MRI imaging     She is n.p.o. and took her Plavix and aspirin last night.  Will give her her dose now and proceed with left ICA cavernous segment aneurysm embolization under general anesthesia    Interval history:   S/p recent DSA that showed persistent filling of the aneurysm Bhavin III with the previously placed Pipeline Shield device covering ~50% of the aneurysm neck.    similar to prior.   5. Moderate luminal narrowing throughout the right paraclinoid ICA.          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            IMPRESSIONS:     #  left cavernous 3x5mm aneurysm s/p pipeline flow diversion 1/6/25 w/ rpt DSA 3/27/25 showing persistent aneurysmal filling Bhavin 3 and previously-placed FD covering ~50% of the aneurysm neck. She presents for scheduled elective second-stage aneurysm embolization status post pipeline embolization with uneventful course    # 4/2/25 CTA L ICA dissection and 4/3/25 MRI small punctate infarcts in L MCA territory and one small punctate infarct in R PICA territory, largely asymptomatic NIH 0, on DAPT, will plan for rpt imaging to assess resolution    PLANS:     -- SBP goal 100-140  -- Continue Aspirin 81 daily  -- Continue Plavix 75 daily  -- Rpt CTA head/neck in 1 week (~4/9/25)  -- PT/OT/ST, discharge planning in progress  -- Rest of care per primary team      Discussed with Dr. Braden.    Please arrange follow-up with Dr. Musa alvarez in 8-12 weeks, and with Dr. Americo Braden in 6-8 months.    Kehinde Olson MD, Pager [phone]  Electronically signed 04/07/25 at 8:33 AM  Stroke, Neurocritical Care & Neurointervention  [facility]  [facility] Stroke [place]

## 2025-04-07 NOTE — CARE COORDINATION
Case Management   Daily Progress Note       Patient Name: Tracey Alarcon                   YOB: 1940  Diagnosis: Hypertensive urgency [I16.0]  Hypertensive emergency [I16.1]                       GMLOS: 2.8 days  Length of Stay: 5  days    Anticipated Discharge Date: One day until discharge    Readmission Risk (Low < 19, Mod (19-27), High > 27): Readmission Risk Score: 15.9        Current Transitional Plan    [] Home Independently    [] Home with HC    [] Skilled Nursing Facility    [x] Acute Rehabilitation    [] Long Term Acute Care (LTAC)    [] Other:     Plan for the Stay (Medical Management) :          Workflow Continuation (Additional Notes) :    Plan is inpatient rehab hospital. Per previous  notes, patient requested referral to [facility]. Upon reviewing, referral fax did not go through. Referral refaxed to [facility]. Call also placed to paul admissions liaison with [facility] and left  message to notify of new referral    1510: spoke with joan at [facility] who states patient has been accepted. She requests a 630pm  time. Faxed request to [facility] for 6:30pm w/c .     1515: updated patient's son on above    1555: spoke with buddy at [facility] who confirms 6pm  time to [facility]. Notified joan in admissions at [facility] of 6pm  time. Perfect serve message to neurology to complete the daiana.     1559: updated patient and family on acceptance to [facility] with confirmed transport time of 6p this evening and they are agreeable to plan.     1715: faxed daiana to facility    Josefina Hung RN  April 7, 2025

## 2025-04-07 NOTE — PROGRESS NOTES
Physical Therapy  Facility/Department: [address] STEPDOWN   Physical Therapy Daily Treatment Note    Patient Name: Tracey Alarcon        MRN: [id number]    : 1940    Date of Service: 2025    No chief complaint on file.    Past Medical History:  has a past medical history of Arthritis, Borderline diabetic, Bronchitis, CAD (coronary artery disease), Class 3 severe obesity without serious comorbidity with body mass index (BMI) of 45.0 to 49.9 in adult, Constipation, GERD (gastroesophageal reflux disease), Hyperlipidemia, Hypertension, Incisional hernia, Irregular heart beat, Mass of soft palate, Prolonged emergence from general anesthesia, Seasonal allergies, Short of breath on exertion, Sleep apnea, SOB (shortness of breath), TIA (transient ischemic attack), Under care of team, Wears glasses, and Wellness examination.  Past Surgical History:  has a past surgical history that includes Knee arthroscopy (Left); Neck surgery (); Rotator cuff repair (Right); Cholecystectomy; Carpal tunnel release (Bilateral); Hysterectomy (); ventral hernia repair (N/A, 2019); Eye surgery (Right); Colonoscopy (N/A, 2021); and other surgical history (2025).    Discharge Recommendations  Discharge Recommendations: Patient would benefit from continued therapy after discharge  PT Equipment Recommendations  Equipment Needed: No (Patient reports owning RW.)    Assessment  Body Structures, Functions, Activity Limitations Requiring Skilled Therapeutic Intervention: Decreased functional mobility ;Decreased strength;Increased pain;Decreased posture;Decreased ROM;Decreased endurance;Decreased balance;Decreased coordination  Assessment: Patient completed bed mobility with HOB elevated, SBA. Patient completed transfers with use of RW, CGA progressing to SBA. Patient ambulated 15ft x 2, seated rest breaks in between, with noted SOB following. Patient ambulated additonal ~80ft, however, decreased beatris, SOB, requiring

## 2025-04-07 NOTE — DISCHARGE SUMMARY
Pt IV removed. Pt left via wheelchair to [facility]. Pt packed all personal belongings including phone and cpap. Report called to [facility] RN with no further questions at this time.

## 2025-04-07 NOTE — PROGRESS NOTES
[facility] Neurology   IN-PATIENT SERVICE   [facility]    Progress Note             Date:   4/7/2025  Patient name:  Tracey Alarcon  Date of admission:  4/2/2025 12:20 PM  MRN:   [id number]  Account:  [id number]  YOB: 1940  PCP:    Aura Rosado MD  Room:   [address] [place] [address]  Code Status:    Full Code    Chief Complaint:     Headache     Interval hx:     The patient was seen and examined at bedside. Is vitally stable, alert and oriented x 3. No acute events overnight.   headache completely resolved, neuroexam unchanged.  Restart Imdur 30 mg  PMR consulted, awaiting recommendation    Brief History of Present Illness:     Tracey Alarcon is an 84-year-old female with a history of an unruptured left ICA aneurysm (treated with a pipeline embolization device on 1/6/25), hypertension, hyperlipidemia, CAD, LAMIN (on CPAP), and prior cholecystectomy. She was recently admitted to [facility] on 3/31/25 for elective treatment of a residual unruptured left cavernous ICA aneurysm and discharged in stable condition on 4/1/25. Since discharge, she has had a persistent, sharp, nonpositional left-sided headache without nausea, vomiting, or visual symptoms.     She now presents with worsening of the same headache headache. On arrival, her SBP was 229 and glucose 101. She was evaluated as a stroke alert due to chronic right-sided weakness. Her NIH stroke scale was 3; mild facial droop and mild drift in the right arm and leg--consistent with her baseline. She denied any new or worsening focal symptoms. CT head was negative for acute findings, and CTA of the head and neck showed expected post-procedure changes, confirmed by the neuroendovascular team. She is admitted for close neurologic monitoring/headache management and blood pressure control.  On reevaluation, patient reports significant improvement in headache coinciding with improvement in blood pressure.    Past Medical History:     Past

## 2025-04-07 NOTE — DISCHARGE SUMMARY
[facility]     Department of Neurology    INPATIENT DISCHARGE SUMMARY        Patient Identification:  Tracey Alarcon is a 84 y.o. female.  :  1940  MRN: [id number]     Acct: [id number]   Admit Date:  2025  Discharge date and time: 2025  Attending Provider: Deepali Marlow DO                                     Admission Diagnoses:   Hypertensive urgency [I16.0]  Hypertensive emergency [I16.1]    Discharge Diagnoses:   Principal Problem:    Hypertensive urgency  Active Problems:    Hypertensive emergency  Resolved Problems:    * No resolved hospital problems. *       Consults:     IP CONSULT TO INTERNAL MEDICINE  IP CONSULT TO PHYSICAL MEDICINE REHAB  IP CONSULT TO PHYSICAL MEDICINE REHAB      Brief Inpatient course:    Tracey Alarcon is an 84-year-old woman with a medical history significant for a treated unruptured left internal carotid artery aneurysm (pipeline embolization on 25), hypertension, hyperlipidemia, coronary artery disease, obstructive sleep apnea on CPAP, and prior cholecystectomy. She was recently discharged from [facility] on 25 following elective intervention for a residual left cavernous ICA aneurysm.    Shortly after discharge, she began experiencing a persistent, sharp, nonpositional headache localized to the left side, without associated nausea, vomiting, or visual changes. On arrival, her systolic blood pressure was markedly elevated at 229 mmHg, and her glucose level was 101 mg/dL. She was evaluated as a stroke alert due to chronic right-sided weakness. Her NIHSS score was 3, reflecting mild right-sided facial droop and drift in the right upper and lower extremities, consistent with her baseline. She reported no new or worsening neurologic deficits.    Initial imaging, including a non-contrast CT head, showed no acute abnormalities. CTA of the head and neck revealed expected post-procedural changes, and the neuroendovascular team was  [place], [place]  4/7/2025, 2:24 PM

## 2025-04-07 NOTE — CONSULTS
Please see initial consult completed 4/04/2025 (Dr. 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 Anticipate she will be able to tolerate 3 hours of therapy per day or 900 minutes per week in rehabilitation. The patient requires multidisciplinary rehabilitation treatment including medical management by a PM&R physician, 24 hour rehabilitation nursing, Physical/Occupational therapy, +/- speech therapy, rehabilitation social work, and nutrition services. Patient and family also require education in post-hospital precautions and home exercise routine, adaptive techniques and deficit compensation strategies, strengthening and conditioning, equipment prescription and instructions in use. Provision of services in a less intensive environment risks significant complications and more limited functional outcomes.  DVT Prophylaxis: on Lovenox

## 2025-04-07 NOTE — DISCHARGE INSTR - COC
Continuity of Care Form    Patient Name: Tracey Alarcon   :  1940  MRN:  [id number]    Admit date:  2025  Discharge date:  25    Code Status Order: Full Code   Advance Directives:     Admitting Physician:  Deepali Marlow DO  PCP: Aura Rosado MD    Discharging Nurse: Reny  Discharging Hospital Unit/Room#: 0431/0431-01  Discharging Unit Phone Number: [phone]    Emergency Contact:   Extended Emergency Contact Information  Primary Emergency Contact: Demario Alarcon  Address: [address] [place] [address]  Home Phone: [phone]  Mobile Phone: [phone]  Relation: Legal Guardian  Secondary Emergency Contact: Dodie Alarcon  Address: [address] [place], [place] [id number] [facility]  Home Phone: [phone]  Relation: Child    Past Surgical History:  Past Surgical History:   Procedure Laterality Date    CARPAL TUNNEL RELEASE Bilateral     2 times on the right and 2 times on the left    CHOLECYSTECTOMY      COLONOSCOPY N/A 2021    COLONOSCOPY DIAGNOSTIC performed by Rajan Mckeon MD at [facility] ENDO    EYE SURGERY Right     Procedure done to right eye    HYSTERECTOMY (CERVIX STATUS UNKNOWN)      \"they took part of the uterus.  I still have periods but they were every other month\", doesn't know why- no longer having menses as of 22    KNEE ARTHROSCOPY Left     NECK SURGERY      titanium screw in neck by Dr. Herndon    OTHER SURGICAL HISTORY  2025    IR ANGIOGRAM CAROTID CEREBRAL BILATERAL    ROTATOR CUFF REPAIR Right     VENTRAL HERNIA REPAIR N/A 2019    OPEN HERNIA INCISIONAL REPAIR W/MESH performed by Raciel Anderson MD at [facility] OR       Immunization History:   Immunization History   Administered Date(s) Administered    COVID-19, PFIZER PURPLE top, DILUTE for use, (age 12 y+), 30mcg/0.3mL 2021, 2021, 10/08/2021    TDaP, ADACEL (age 10y-64y), BOOSTRIX (age 10y+), IM, 0.5mL 2022       Active  Problems:  Patient Active Problem List   Diagnosis Code    CAD (coronary artery disease) I25.10    Gastroesophageal reflux disease K21.9    Hypertension I10    Hypercholesterolemia E78.00    Osteoarthritis of right glenohumeral joint M19.011    Chest discomfort R07.89    Mixed hyperlipidemia E78.2    Status post hysterectomy Z90.710    S/P lateral meniscal repair Z98.890    Encounter for cholecystectomy Z76.89    RAD (reactive airway disease), mild intermittent, uncomplicated J45.20    History of stroke Z86.73    Prediabetes R73.03    LAMIN (obstructive sleep apnea) G47.33    Aneurysm of cavernous portion of left internal carotid artery I67.1    Aneurysm of left carotid artery I72.0    Aneurysm I72.9    Vascular dementia without behavioral disturbance, psychotic disturbance, mood disturbance, or anxiety, unspecified dementia severity (HCC) F01.50    Acute nonintractable headache R51.9    Body mass index [BMI] 40.0-44.9, adult (Z68.41) Z68.41    Brain aneurysm I67.1    Hypertensive urgency I16.0    Hypertensive emergency I16.1       Isolation/Infection:   Isolation            No Isolation          Patient Infection Status    None to display              Nurse Assessment:  Last Vital Signs: BP (!) 103/55   Pulse 66   Temp 97.3 °F (36.3 °C) (Oral)   Resp 18   Ht 1.524 m (5')   Wt 99.8 kg (220 lb)   SpO2 96%   BMI 42.97 kg/m²     Last documented pain score (0-10 scale): Pain Level: 5  Last Weight:   Wt Readings from Last 1 Encounters:   04/02/25 99.8 kg (220 lb)     Mental Status:  oriented and alert    IV Access:  - None    Nursing Mobility/ADLs:  Walking   Assisted  Transfer  Independent  Bathing  Assisted  Dressing  Independent  Toileting  Assisted  Feeding  Independent  Med Admin  Assisted  Med Delivery   whole    Wound Care Documentation and Therapy:  Incision 04/05/19 Abdomen (Active)   Number of days: 2194        Elimination:  Continence:   Bowel: Yes  Bladder: Yes  Urinary Catheter: None

## 2025-04-07 NOTE — PLAN OF CARE
Problem: Discharge Planning  Goal: Discharge to home or other facility with appropriate resources  Outcome: Progressing  Flowsheets (Taken 4/6/2025 2000)  Discharge to home or other facility with appropriate resources: Identify discharge learning needs (meds, wound care, etc)     Problem: Safety - Adult  Goal: Free from fall injury  Outcome: Progressing     Problem: ABCDS Injury Assessment  Goal: Absence of physical injury  Outcome: Progressing     Problem: Pain  Goal: Verbalizes/displays adequate comfort level or baseline comfort level  Outcome: Progressing

## 2025-04-07 NOTE — PLAN OF CARE
Problem: Discharge Planning  Goal: Discharge to home or other facility with appropriate resources  4/7/2025 1200 by Julissa Moreno RN  Outcome: Progressing  4/7/2025 1159 by Julissa Moreno RN  Outcome: Progressing  4/7/2025 0639 by Rajwinder Arroyo RN  Outcome: Progressing  Flowsheets (Taken 4/6/2025 2000)  Discharge to home or other facility with appropriate resources: Identify discharge learning needs (meds, wound care, etc)     Problem: Safety - Adult  Goal: Free from fall injury  4/7/2025 1200 by Julissa Moreno RN  Outcome: Progressing  4/7/2025 1159 by Julissa Moreno RN  Outcome: Progressing  4/7/2025 0639 by Rajwinder Arroyo RN  Outcome: Progressing     Problem: ABCDS Injury Assessment  Goal: Absence of physical injury  4/7/2025 1200 by Julissa Moreno RN  Outcome: Progressing  4/7/2025 1159 by Julissa Moreno RN  Outcome: Progressing  4/7/2025 0639 by Rajwinder Arroyo RN  Outcome: Progressing     Problem: Pain  Goal: Verbalizes/displays adequate comfort level or baseline comfort level  4/7/2025 1200 by Julissa Moreno RN  Outcome: Progressing  4/7/2025 1159 by Julissa Moreno RN  Outcome: Progressing  4/7/2025 0639 by Rajwinder Arroyo RN  Outcome: Progressing

## 2025-04-08 ENCOUNTER — HOSPITAL ENCOUNTER (OUTPATIENT)
Age: 85
Setting detail: SPECIMEN
Discharge: HOME | End: 2025-04-08

## 2025-04-08 LAB
ALBUMIN SERPL-MCNC: 3.2 G/DL (ref 3.5–5.2)
ALBUMIN/GLOB SERPL: 1.2 {RATIO} (ref 1–2.5)
ALP SERPL-CCNC: 72 U/L (ref 35–104)
ALT SERPL-CCNC: 22 U/L (ref 10–35)
ANION GAP SERPL CALCULATED.3IONS-SCNC: 11 MMOL/L (ref 9–16)
AST SERPL-CCNC: 26 U/L (ref 10–35)
BASOPHILS # BLD: 0.03 K/UL (ref 0–0.2)
BASOPHILS NFR BLD: 0 % (ref 0–2)
BILIRUB SERPL-MCNC: 0.3 MG/DL (ref 0–1.2)
BUN SERPL-MCNC: 16 MG/DL (ref 8–23)
CALCIUM SERPL-MCNC: 8.7 MG/DL (ref 8.8–10.2)
CHLORIDE SERPL-SCNC: 104 MMOL/L (ref 98–107)
CO2 SERPL-SCNC: 25 MMOL/L (ref 20–31)
CREAT SERPL-MCNC: 0.6 MG/DL (ref 0.5–0.9)
EOSINOPHIL # BLD: 0.63 K/UL (ref 0–0.44)
EOSINOPHILS RELATIVE PERCENT: 7 % (ref 1–4)
ERYTHROCYTE [DISTWIDTH] IN BLOOD BY AUTOMATED COUNT: 13.4 % (ref 11.8–14.4)
GFR, ESTIMATED: 89 ML/MIN/1.73M2
GLUCOSE SERPL-MCNC: 87 MG/DL (ref 82–115)
HCT VFR BLD AUTO: 39 % (ref 36.3–47.1)
HGB BLD-MCNC: 12.5 G/DL (ref 11.9–15.1)
IMM GRANULOCYTES # BLD AUTO: 0.12 K/UL (ref 0–0.3)
IMM GRANULOCYTES NFR BLD: 1 %
LYMPHOCYTES NFR BLD: 2.15 K/UL (ref 1.1–3.7)
LYMPHOCYTES RELATIVE PERCENT: 25 % (ref 24–43)
MAGNESIUM SERPL-MCNC: 2.1 MG/DL (ref 1.6–2.4)
MCH RBC QN AUTO: 30.4 PG (ref 25.2–33.5)
MCHC RBC AUTO-ENTMCNC: 32.1 G/DL (ref 28.4–34.8)
MCV RBC AUTO: 94.9 FL (ref 82.6–102.9)
MONOCYTES NFR BLD: 1.2 K/UL (ref 0.1–1.2)
MONOCYTES NFR BLD: 14 % (ref 3–12)
NEUTROPHILS NFR BLD: 53 % (ref 36–65)
NEUTS SEG NFR BLD: 4.38 K/UL (ref 1.5–8.1)
NRBC BLD-RTO: 0 PER 100 WBC
PHOSPHATE SERPL-MCNC: 3.4 MG/DL (ref 2.5–4.5)
PLATELET # BLD AUTO: 280 K/UL (ref 138–453)
PMV BLD AUTO: 9.9 FL (ref 8.1–13.5)
POTASSIUM SERPL-SCNC: 4.1 MMOL/L (ref 3.7–5.3)
PROT SERPL-MCNC: 5.9 G/DL (ref 6.6–8.7)
RBC # BLD AUTO: 4.11 M/UL (ref 3.95–5.11)
SODIUM SERPL-SCNC: 140 MMOL/L (ref 136–145)
WBC OTHER # BLD: 8.5 K/UL (ref 3.5–11.3)

## 2025-04-08 PROCEDURE — 85025 COMPLETE CBC W/AUTO DIFF WBC: CPT

## 2025-04-08 PROCEDURE — 83735 ASSAY OF MAGNESIUM: CPT

## 2025-04-08 PROCEDURE — 80053 COMPREHEN METABOLIC PANEL: CPT

## 2025-04-08 PROCEDURE — 36415 COLL VENOUS BLD VENIPUNCTURE: CPT

## 2025-04-08 PROCEDURE — 84100 ASSAY OF PHOSPHORUS: CPT

## 2025-04-11 ENCOUNTER — HOSPITAL ENCOUNTER (OUTPATIENT)
Age: 85
Setting detail: SPECIMEN
Discharge: HOME | End: 2025-04-11

## 2025-04-11 LAB
ALBUMIN SERPL-MCNC: 3.5 G/DL (ref 3.5–5.2)
ALBUMIN/GLOB SERPL: 1.2 {RATIO} (ref 1–2.5)
ALP SERPL-CCNC: 78 U/L (ref 35–104)
ALT SERPL-CCNC: 22 U/L (ref 10–35)
ANION GAP SERPL CALCULATED.3IONS-SCNC: 11 MMOL/L (ref 9–16)
AST SERPL-CCNC: 20 U/L (ref 10–35)
BASOPHILS # BLD: 0.06 K/UL (ref 0–0.2)
BASOPHILS NFR BLD: 1 % (ref 0–2)
BILIRUB SERPL-MCNC: 0.3 MG/DL (ref 0–1.2)
BUN SERPL-MCNC: 17 MG/DL (ref 8–23)
CALCIUM SERPL-MCNC: 9.1 MG/DL (ref 8.8–10.2)
CHLORIDE SERPL-SCNC: 103 MMOL/L (ref 98–107)
CO2 SERPL-SCNC: 25 MMOL/L (ref 20–31)
CREAT SERPL-MCNC: 0.7 MG/DL (ref 0.5–0.9)
EOSINOPHIL # BLD: 0.67 K/UL (ref 0–0.44)
EOSINOPHILS RELATIVE PERCENT: 9 % (ref 1–4)
ERYTHROCYTE [DISTWIDTH] IN BLOOD BY AUTOMATED COUNT: 13.3 % (ref 11.8–14.4)
GFR, ESTIMATED: 86 ML/MIN/1.73M2
GLUCOSE SERPL-MCNC: 94 MG/DL (ref 82–115)
HCT VFR BLD AUTO: 38.4 % (ref 36.3–47.1)
HGB BLD-MCNC: 12.3 G/DL (ref 11.9–15.1)
IMM GRANULOCYTES # BLD AUTO: 0.1 K/UL (ref 0–0.3)
IMM GRANULOCYTES NFR BLD: 1 %
LYMPHOCYTES NFR BLD: 2.23 K/UL (ref 1.1–3.7)
LYMPHOCYTES RELATIVE PERCENT: 30 % (ref 24–43)
MCH RBC QN AUTO: 30.6 PG (ref 25.2–33.5)
MCHC RBC AUTO-ENTMCNC: 32 G/DL (ref 28.4–34.8)
MCV RBC AUTO: 95.5 FL (ref 82.6–102.9)
MONOCYTES NFR BLD: 1.42 K/UL (ref 0.1–1.2)
MONOCYTES NFR BLD: 19 % (ref 3–12)
NEUTROPHILS NFR BLD: 40 % (ref 36–65)
NEUTS SEG NFR BLD: 2.99 K/UL (ref 1.5–8.1)
NRBC BLD-RTO: 0 PER 100 WBC
PLATELET # BLD AUTO: 307 K/UL (ref 138–453)
PMV BLD AUTO: 9.8 FL (ref 8.1–13.5)
POTASSIUM SERPL-SCNC: 4.2 MMOL/L (ref 3.7–5.3)
PROT SERPL-MCNC: 6.4 G/DL (ref 6.6–8.7)
RBC # BLD AUTO: 4.02 M/UL (ref 3.95–5.11)
SODIUM SERPL-SCNC: 138 MMOL/L (ref 136–145)
WBC OTHER # BLD: 7.5 K/UL (ref 3.5–11.3)

## 2025-04-11 PROCEDURE — 85025 COMPLETE CBC W/AUTO DIFF WBC: CPT

## 2025-04-11 PROCEDURE — 80053 COMPREHEN METABOLIC PANEL: CPT

## 2025-04-11 PROCEDURE — 36415 COLL VENOUS BLD VENIPUNCTURE: CPT

## 2025-04-15 ENCOUNTER — HOSPITAL ENCOUNTER (OUTPATIENT)
Age: 85
Setting detail: SPECIMEN
Discharge: HOME | End: 2025-04-15

## 2025-04-15 LAB
ALBUMIN SERPL-MCNC: 3.6 G/DL (ref 3.5–5.2)
ALBUMIN/GLOB SERPL: 1.2 {RATIO} (ref 1–2.5)
ALP SERPL-CCNC: 80 U/L (ref 35–104)
ALT SERPL-CCNC: 15 U/L (ref 10–35)
ANION GAP SERPL CALCULATED.3IONS-SCNC: 13 MMOL/L (ref 9–16)
AST SERPL-CCNC: 18 U/L (ref 10–35)
BASOPHILS # BLD: 0.06 K/UL (ref 0–0.2)
BASOPHILS NFR BLD: 1 % (ref 0–2)
BILIRUB SERPL-MCNC: 0.3 MG/DL (ref 0–1.2)
BUN SERPL-MCNC: 21 MG/DL (ref 8–23)
CALCIUM SERPL-MCNC: 9.3 MG/DL (ref 8.8–10.2)
CHLORIDE SERPL-SCNC: 104 MMOL/L (ref 98–107)
CO2 SERPL-SCNC: 22 MMOL/L (ref 20–31)
CREAT SERPL-MCNC: 0.7 MG/DL (ref 0.5–0.9)
EOSINOPHIL # BLD: 0.45 K/UL (ref 0–0.44)
EOSINOPHILS RELATIVE PERCENT: 6 % (ref 1–4)
ERYTHROCYTE [DISTWIDTH] IN BLOOD BY AUTOMATED COUNT: 13.2 % (ref 11.8–14.4)
GFR, ESTIMATED: 82 ML/MIN/1.73M2
GLUCOSE SERPL-MCNC: 86 MG/DL (ref 82–115)
HCT VFR BLD AUTO: 39 % (ref 36.3–47.1)
HGB BLD-MCNC: 12.7 G/DL (ref 11.9–15.1)
IMM GRANULOCYTES # BLD AUTO: 0.06 K/UL (ref 0–0.3)
IMM GRANULOCYTES NFR BLD: 1 %
LYMPHOCYTES NFR BLD: 2.28 K/UL (ref 1.1–3.7)
LYMPHOCYTES RELATIVE PERCENT: 30 % (ref 24–43)
MCH RBC QN AUTO: 30.7 PG (ref 25.2–33.5)
MCHC RBC AUTO-ENTMCNC: 32.6 G/DL (ref 28.4–34.8)
MCV RBC AUTO: 94.2 FL (ref 82.6–102.9)
MONOCYTES NFR BLD: 1.34 K/UL (ref 0.1–1.2)
MONOCYTES NFR BLD: 18 % (ref 3–12)
NEUTROPHILS NFR BLD: 44 % (ref 36–65)
NEUTS SEG NFR BLD: 3.31 K/UL (ref 1.5–8.1)
NRBC BLD-RTO: 0 PER 100 WBC
PLATELET # BLD AUTO: 298 K/UL (ref 138–453)
PMV BLD AUTO: 9.8 FL (ref 8.1–13.5)
POTASSIUM SERPL-SCNC: 4.4 MMOL/L (ref 3.7–5.3)
PROT SERPL-MCNC: 6.4 G/DL (ref 6.6–8.7)
RBC # BLD AUTO: 4.14 M/UL (ref 3.95–5.11)
SODIUM SERPL-SCNC: 139 MMOL/L (ref 136–145)
WBC OTHER # BLD: 7.5 K/UL (ref 3.5–11.3)

## 2025-04-15 PROCEDURE — 85025 COMPLETE CBC W/AUTO DIFF WBC: CPT

## 2025-04-15 PROCEDURE — 36415 COLL VENOUS BLD VENIPUNCTURE: CPT

## 2025-04-15 PROCEDURE — 80053 COMPREHEN METABOLIC PANEL: CPT

## 2025-04-18 ENCOUNTER — PATIENT MESSAGE (OUTPATIENT)
Dept: FAMILY MEDICINE CLINIC | Age: 85
End: 2025-04-18

## 2025-04-21 ENCOUNTER — CARE COORDINATION (OUTPATIENT)
Dept: CARE COORDINATION | Age: 85
End: 2025-04-21

## 2025-04-22 ENCOUNTER — HOSPITAL ENCOUNTER (OUTPATIENT)
Dept: CT IMAGING | Age: 85
Discharge: HOME | End: 2025-04-24
Payer: MEDICARE

## 2025-04-22 DIAGNOSIS — I72.0 ANEURYSM OF LEFT CAROTID ARTERY: ICD-10-CM

## 2025-04-22 DIAGNOSIS — I67.1 ANEURYSM OF CAVERNOUS PORTION OF LEFT INTERNAL CAROTID ARTERY: ICD-10-CM

## 2025-04-22 DIAGNOSIS — I67.1 BRAIN ANEURYSM: ICD-10-CM

## 2025-04-22 LAB
EGFR, POC: 89 ML/MIN/1.73M2
POC CREATININE: 0.6 MG/DL (ref 0.51–1.19)

## 2025-04-22 PROCEDURE — 6360000004 HC RX CONTRAST MEDICATION

## 2025-04-22 PROCEDURE — 82565 ASSAY OF CREATININE: CPT

## 2025-04-22 PROCEDURE — 2500000003 HC RX 250 WO HCPCS

## 2025-04-22 PROCEDURE — 2580000003 HC RX 258

## 2025-04-22 PROCEDURE — 70496 CT ANGIOGRAPHY HEAD: CPT

## 2025-04-22 RX ORDER — IOPAMIDOL 755 MG/ML
75 INJECTION, SOLUTION INTRAVASCULAR
Status: COMPLETED | OUTPATIENT
Start: 2025-04-22 | End: 2025-04-22

## 2025-04-22 RX ORDER — SODIUM CHLORIDE 0.9 % (FLUSH) 0.9 %
10 SYRINGE (ML) INJECTION PRN
Status: DISCONTINUED | OUTPATIENT
Start: 2025-04-22 | End: 2025-04-25 | Stop reason: HOSPADM

## 2025-04-22 RX ORDER — 0.9 % SODIUM CHLORIDE 0.9 %
100 INTRAVENOUS SOLUTION INTRAVENOUS ONCE
Status: COMPLETED | OUTPATIENT
Start: 2025-04-22 | End: 2025-04-22

## 2025-04-22 RX ADMIN — SODIUM CHLORIDE 100 ML: 9 INJECTION, SOLUTION INTRAVENOUS at 14:12

## 2025-04-22 RX ADMIN — IOPAMIDOL 75 ML: 755 INJECTION, SOLUTION INTRAVENOUS at 14:12

## 2025-04-22 RX ADMIN — SODIUM CHLORIDE, PRESERVATIVE FREE 10 ML: 5 INJECTION INTRAVENOUS at 14:12

## 2025-04-24 ENCOUNTER — OFFICE VISIT (OUTPATIENT)
Dept: INTERNAL MEDICINE CLINIC | Age: 85
End: 2025-04-24

## 2025-04-24 VITALS
SYSTOLIC BLOOD PRESSURE: 106 MMHG | HEIGHT: 60 IN | WEIGHT: 219.2 LBS | DIASTOLIC BLOOD PRESSURE: 68 MMHG | OXYGEN SATURATION: 98 % | BODY MASS INDEX: 43.04 KG/M2 | HEART RATE: 72 BPM

## 2025-04-24 DIAGNOSIS — I10 ESSENTIAL HYPERTENSION: ICD-10-CM

## 2025-04-24 DIAGNOSIS — I67.1 ANEURYSM OF INTRACRANIAL PORTION OF LEFT INTERNAL CAROTID ARTERY: Primary | ICD-10-CM

## 2025-04-24 NOTE — PROGRESS NOTES
[facility] [address] [address]  Dept: [phone]  Dept Fax: [phone]     Name: Tracey Alarcon  : 1940           Chief Complaint   Patient presents with    Follow-Up from [facility] - HTN & Stroke       History of Present Illness  The patient presents for a post-hospitalization visit.    She was admitted to [facility] from 2025 to 2025 due to severe hypertension, which was subsequently stabilized. During her hospital stay, she was evaluated by the neuroendovascular team. A stent was placed in 2025 to manage an aneurysm, but it dislodged, necessitating a second procedure at the end of 2025. Following this, stroke-like symptoms were experienced, leading to readmission to the emergency room. Her MRI brain on 2025 showed small infarcts in the brain. She was hospitalized for a week before being transferred to a rehabilitation facility for 10 days. Discharge from rehab occurred last week, and she has been home for about a week. Less fatigue is reported compared to before, and physical and occupational therapy are being undertaken to improve hand coordination. There is no history of heart disease, stent placement, or bypass surgery. A heart catheterization was performed in the past, but no heart attacks have occurred. A CT scan is scheduled for this week, and an appointment with neurology needs to be arranged. She is on aspirin, Plavix, and Lipitor.    Residual weakness in the right hand and arm is reported following the stroke, and efforts are being made to regain writing ability. A rotator cuff issue limits arm movement above the shoulder joint. Since the arterial procedure, lifting the arm has become more difficult. Minor speech difficulties are occasionally experienced, particularly when fatigued, but these are not severe enough to cause concern.    She has sleep apnea and uses a CPAP

## 2025-04-28 ENCOUNTER — CARE COORDINATION (OUTPATIENT)
Dept: CARE COORDINATION | Age: 85
End: 2025-04-28

## 2025-04-28 RX ORDER — AMLODIPINE BESYLATE 5 MG/1
5 TABLET ORAL NIGHTLY
Qty: 90 TABLET | OUTPATIENT
Start: 2025-04-28

## 2025-04-28 NOTE — CARE COORDINATION
[phone]    7/9/2025 10:30 AM Americo Braden MD Neurology [phone]    7/15/2025 3:30 PM Holland Wang MD Neurology [phone]            Follow Up:   Plan for next AC outreach in approximately 1 week to complete:  - disease specific assessments  - medication review   - goal progression  - education   - RPM.   Patient  is agreeable to this plan.

## 2025-05-05 ENCOUNTER — CARE COORDINATION (OUTPATIENT)
Dept: CARE COORDINATION | Age: 85
End: 2025-05-05

## 2025-05-05 NOTE — CARE COORDINATION
Spoke with [facility] regarding briefs. They stated that they no longer carry briefs.   Will update PCP office and request that they are sent to another DME.   Suggestion Roberth:  Phone: [phone]  Fax: [phone]

## 2025-05-05 NOTE — CARE COORDINATION
Ambulatory Care Coordination Note     2025 11:59 AM     Patient Current Location:  Home: [address]  [place] [address]     ACM contacted the patient by telephone. Verified name and  with patient as identifiers.         ACM: GABY PEARCE RN     Challenges to be reviewed by the provider   Additional needs identified to be addressed with provider No  none               Method of communication with provider: none.    Utilization: Patient has not had any utilization since our last call.     Care Summary Note: Patient stated that she is suffering from a tooth ache. She can not have it pulled until she is able to hold her blood thinner. She stated in the past her dentist ordered some antibiotics for her. I encouraged her to call the dentist again and explain the situation.   No other needs today per patient.  Appointment reminder provided for upcoming appointment.   Offered patient enrollment in the Remote Patient Monitoring (RPM) program for in-home monitoring: Yes, patient enrolled; current status is activated and monitoring.   - Current Patient Metrics ---- Activity: - mins Blood Pressure: 134/75, 73bpm Pulseox: 97%, 79bpm Survey: 0 Note Created at: 2025 09:42 AM    Patient stated she has not heard anything regarding the depends, will route to [facility] at PCP office to follow up on.   Assessments Completed:   General Assessment    Do you have any symptoms that are causing concern?: Yes  Progression since Onset: Intermittent - Waxing/Waning  Reported Symptoms: Other (Comment: dental pain)          Medications Reviewed:   Completed during this call    Advance Care Planning:   Reviewed and current     Care Planning:   Education Documentation  General medication information, taught by Gaby Pearce RN at 2025 11:58 AM.  Learner: Patient  Readiness: Acceptance  Method: Explanation  Response: Verbalizes Understanding    Educate reporting changes in condition, taught by Gaby Pearce, MAR at

## 2025-05-08 ENCOUNTER — OFFICE VISIT (OUTPATIENT)
Dept: INTERNAL MEDICINE CLINIC | Age: 85
End: 2025-05-08

## 2025-05-08 VITALS
SYSTOLIC BLOOD PRESSURE: 134 MMHG | WEIGHT: 216.2 LBS | TEMPERATURE: 96.9 F | RESPIRATION RATE: 18 BRPM | HEIGHT: 62 IN | DIASTOLIC BLOOD PRESSURE: 72 MMHG | OXYGEN SATURATION: 99 % | HEART RATE: 65 BPM | BODY MASS INDEX: 39.79 KG/M2

## 2025-05-08 DIAGNOSIS — Z00.00 MEDICARE ANNUAL WELLNESS VISIT, SUBSEQUENT: Primary | ICD-10-CM

## 2025-05-08 RX ORDER — AMOXICILLIN 500 MG/1
CAPSULE ORAL
COMMUNITY
Start: 2025-05-05

## 2025-05-08 ASSESSMENT — PATIENT HEALTH QUESTIONNAIRE - PHQ9
SUM OF ALL RESPONSES TO PHQ QUESTIONS 1-9: 0
SUM OF ALL RESPONSES TO PHQ QUESTIONS 1-9: 0
2. FEELING DOWN, DEPRESSED OR HOPELESS: NOT AT ALL
1. LITTLE INTEREST OR PLEASURE IN DOING THINGS: NOT AT ALL
SUM OF ALL RESPONSES TO PHQ QUESTIONS 1-9: 0
SUM OF ALL RESPONSES TO PHQ QUESTIONS 1-9: 0

## 2025-05-08 NOTE — PROGRESS NOTES
Medicare Annual Wellness Visit    Tracey Alarcon is here for Medicare AWV and Immunizations (Due for covid, pneumococcal, shingles, and RSV vaccines)    Assessment & Plan        No follow-ups on file.     Subjective       Patient's complete Health Risk Assessment and screening values have been reviewed and are found in Flowsheets. The following problems were reviewed today and where indicated follow up appointments were made and/or referrals ordered.    Positive Risk Factor Screenings with Interventions:    Fall Risk:  Do you feel unsteady or are you worried about falling? : no  2 or more falls in past year?: (!) yes  Fall with injury in past year?: no     Interventions:    Patient comments: kicked box, kicked cane from under her, fell. Uses cane often  Reviewed medications, home hazards, visual acuity, and co-morbidities that can increase risk for falls               Abnormal BMI (obese):  Body mass index is 39.54 kg/m². (!) Abnormal    Interventions:  Patient declines any further evaluation or treatment           Safety:  Do you have working smoke detectors?: (!) No    Interventions:  Patient comments: 3-4 years without, daughter here stating going to get new ones    ADL's:   Patient reports needing help with:  Select all that apply: (!) Grooming    Interventions:  Patient declined any further interventions or treatment      Medicare Annual Wellness Visit Express Report     Sexual Activity    Not currently sexually active; Partners: Male.     Sexual Activity Last Reviewed    Sexual Activity    Reviewed By Date/Time   Patricia Pablo LPN 5/8/2025 11:22 AM   Gini Sharma, RN 3/31/2025 10:15 AM   Emma Whiting, RN 1/6/2025 10:26 AM   Hyacinth Hawley RN 12/30/2024  1:22 PM     Fall Risk Screening Results    Flowsheet Row Office Visit from 5/8/2025 in [facility] Office Visit from 9/19/2024 in [facility]   Fall Risk Assessment     Do you feel unsteady or are you worried about falling? no yes

## 2025-05-19 ENCOUNTER — CARE COORDINATION (OUTPATIENT)
Dept: CASE MANAGEMENT | Age: 85
End: 2025-05-19

## 2025-05-19 NOTE — CARE COORDINATION
Remote Patient Monitoring Note      Date/Time:  2025 1:43 PM  Patient Current Location: Home: [address] [facility] [address]  LPN contacted patient by telephone regarding yellow alert received for NO metrics in 5 days . Verified patients name and  as identifiers.  Background: HTN  Clinical Interventions: Reviewed and followed up on alerts and treatments-Spoke to patient she states she is doing well . She moved her equipment last week to a different area and now forgot they were there she agrees to place metrics moving forward    Plan/Follow Up: Will continue to review, monitor and address alerts with follow up based on severity of symptoms and risk factors.

## 2025-06-03 ENCOUNTER — CARE COORDINATION (OUTPATIENT)
Dept: CARE COORDINATION | Age: 85
End: 2025-06-03

## 2025-06-03 NOTE — CARE COORDINATION
Ambulatory Care Coordination Note     6/3/2025 3:40 PM     Patient Current Location:  Home: [address]  [place] [address]     ACM contacted the patient by telephone. Verified name and  with patient as identifiers.         ACM: ANURADHA VELÁSQUEZ RN     Challenges to be reviewed by the provider   Additional needs identified to be addressed with provider No  none               Method of communication with provider: none.    Utilization: Patient has not had any utilization since our last call.     Care Summary Note: Patient stated she is doing ok at this time. She reports that she was not able to get the depends. She was told that medicare does not cover them. I encouraged her to call the number on her insurance card to verify that and if they will cover and if so ask where should the order be sent.   Patient denied any additional needs.   Offered patient enrollment in the Remote Patient Monitoring (RPM) program for in-home monitoring: Yes, patient enrolled; current status is activated and monitoring.   Reminder to monitor everyday.   Assessments Completed:   General Assessment    Do you have any symptoms that are causing concern?: No          Medications Reviewed:   Patient denies any changes with medications and reports taking all medications as prescribed.    Advance Care Planning:   Reviewed and current     Care Planning:   Education Documentation  No documentation found.  Education Comments  No comments found.     ,    Goals Addressed                   This Visit's Progress     Conditions and Symptoms   On track     I will schedule office visits, as directed by my provider.  I will keep my appointment or reschedule if I have to cancel.  I will notify my provider of any barriers to my plan of care.  I will follow my Zone Management tool to seek urgent or emergent care.  I will notify my provider of any symptoms that indicate a worsening of my condition.    Barriers: overwhelmed by complexity of

## 2025-06-12 ENCOUNTER — CARE COORDINATION (OUTPATIENT)
Dept: CASE MANAGEMENT | Age: 85
End: 2025-06-12

## 2025-06-13 ENCOUNTER — CARE COORDINATION (OUTPATIENT)
Dept: CASE MANAGEMENT | Age: 85
End: 2025-06-13

## 2025-06-13 NOTE — CARE COORDINATION
Remote Patient Monitoring Note      Date/Time:  2025 1:29 PM  Patient Current Location: Home: [address] [place] [address]  LPN contacted patient by telephone regarding yellow alert received for no metrics in 3 days . Verified patients name and  as identifiers.  Background: HTN  Clinical Interventions: spoke to patient she states her tablet has switched and she was given technical support number to assist her she did not want them to call her.    Plan/Follow Up: Will continue to review, monitor and address alerts with follow up based on severity of symptoms and risk factors.

## 2025-06-17 ENCOUNTER — CARE COORDINATION (OUTPATIENT)
Dept: CARE COORDINATION | Age: 85
End: 2025-06-17

## 2025-06-17 ENCOUNTER — CARE COORDINATION (OUTPATIENT)
Dept: PRIMARY CARE CLINIC | Age: 85
End: 2025-06-17

## 2025-06-17 DIAGNOSIS — I10 HYPERTENSION, UNSPECIFIED TYPE: Primary | ICD-10-CM

## 2025-06-17 NOTE — CARE COORDINATION
Ambulatory Care Coordination Note     2025 11:10 AM     Patient Current Location:  Home: [address] [place], [place] [id number]     ACM contacted the patient by telephone. Verified name and  with patient as identifiers.     Patient graduated from the High Risk Care Management program on 2025.  Patient verbalizes confidence in the ability to self-manage at this time. has the ability to self-manage at this time..  Care management goals have been completed. No further Ambulatory Care Manager follow up scheduled.      ACM: ANURADHA VELÁSQUEZ RN     Challenges to be reviewed by the provider   Additional needs identified to be addressed with provider Yes  Patient would like a PB cuff and pulse ox ordered. She has completed RPM.               Method of communication with provider: Added to appointment note for this week. .    Utilization: Patient has not had any utilization since our last call.     Care Summary Note: Patient denied any needs, she reports she is doing well. She is having occasional swelling in her legs. She follows a low salt diet and elevates legs with rest. She plans to discuss with PCP at this week's appointment. DME request pended in the appointment note. No other needs per patient.     Offered patient enrollment in the Remote Patient Monitoring (RPM) program for in-home monitoring: Yes, patient enrolled; current status is activated and monitoring.   Remote Patient Monitoring Graduation      Date/Time:  2025 11:15 AM  Patient Current Location: Home: [address] [place], [place] [id number]  Patient has graduated from the Remote Patient Monitoring program on 2025.   RPM goals have been met at this time.      Patient has been provided instruction on process to return RPM equipment and RPM has been deactivated.     Patient has AC's contact information for any further questions, concerns, or needs.    Assessments Completed:   General Assessment              Medications Reviewed:

## 2025-06-17 NOTE — CARE COORDINATION
Tracey Aalrcon  6/17/25    Care Coordination  placed call to Patient  to arrange RPM kit  through [employer].     CCSS spoke to Patient reviewed with Patient how to pack equipment in original packing. [employer] will  equipment in 2-4 days.   All questions and concerns answered.

## 2025-06-17 NOTE — PROGRESS NOTES
Remote Patient Order Discontinued    Received request from Gaby Pearce, RN   to discontinue order for remote patient monitoring of HTN and order completed.

## 2025-06-19 ENCOUNTER — OFFICE VISIT (OUTPATIENT)
Dept: INTERNAL MEDICINE CLINIC | Age: 85
End: 2025-06-19

## 2025-06-19 VITALS
DIASTOLIC BLOOD PRESSURE: 60 MMHG | BODY MASS INDEX: 39.93 KG/M2 | HEART RATE: 65 BPM | OXYGEN SATURATION: 92 % | SYSTOLIC BLOOD PRESSURE: 100 MMHG | HEIGHT: 62 IN | WEIGHT: 217 LBS

## 2025-06-19 DIAGNOSIS — I25.10 CORONARY ARTERY DISEASE INVOLVING NATIVE CORONARY ARTERY OF NATIVE HEART WITHOUT ANGINA PECTORIS: ICD-10-CM

## 2025-06-19 DIAGNOSIS — R60.0 BILATERAL LOWER EXTREMITY EDEMA: ICD-10-CM

## 2025-06-19 DIAGNOSIS — I10 ESSENTIAL HYPERTENSION: ICD-10-CM

## 2025-06-19 DIAGNOSIS — R09.82 POST-NASAL DRIP: Primary | ICD-10-CM

## 2025-06-19 RX ORDER — FUROSEMIDE 20 MG/1
20 TABLET ORAL DAILY PRN
Qty: 60 TABLET | Refills: 0 | Status: SHIPPED | OUTPATIENT
Start: 2025-06-19

## 2025-06-19 RX ORDER — CARVEDILOL 12.5 MG/1
12.5 TABLET ORAL 2 TIMES DAILY
Qty: 180 TABLET | Refills: 1 | Status: SHIPPED | OUTPATIENT
Start: 2025-06-19 | End: 2025-12-16

## 2025-06-19 RX ORDER — NITROGLYCERIN 0.4 MG/1
0.4 TABLET SUBLINGUAL EVERY 5 MIN PRN
Qty: 10 TABLET | Refills: 0 | Status: SHIPPED | OUTPATIENT
Start: 2025-06-19

## 2025-06-19 RX ORDER — AMLODIPINE BESYLATE 2.5 MG/1
2.5 TABLET ORAL NIGHTLY
Qty: 30 TABLET | Refills: 1 | Status: SHIPPED | OUTPATIENT
Start: 2025-06-19

## 2025-06-19 RX ORDER — BLOOD PRESSURE TEST KIT
KIT MISCELLANEOUS
Qty: 1 KIT | Refills: 0 | Status: SHIPPED | OUTPATIENT
Start: 2025-06-19

## 2025-06-19 ASSESSMENT — ENCOUNTER SYMPTOMS
DIARRHEA: 0
ABDOMINAL PAIN: 0
WHEEZING: 0
ALLERGIC/IMMUNOLOGIC NEGATIVE: 1
CONSTIPATION: 0
SHORTNESS OF BREATH: 0
BACK PAIN: 0
NAUSEA: 0
COUGH: 0
VOMITING: 0

## 2025-06-19 NOTE — PROGRESS NOTES
Tracey Alarcon provided verbal consent for the provider to use the CHLOE recording tool during their visit.

## 2025-06-19 NOTE — PROGRESS NOTES
[facility]  [address] [address]  Dept: [phone]  Dept Fax: [phone]    OFFICE VISIT NOTE  Date of patient's visit: 6/19/2025  Patient's Name:  Tracey Alarcon YOB: 1940            Patient Care Team:  Aura Rosado MD as PCP - General (Internal Medicine)  Aura Rosado MD as PCP - Empaneled Provider  Rajan Mckeon MD as Consulting Physician (Gastroenterology)  _________________________________________    ________________________________________________  Chief Complaint:   Leg Swelling (Bilateral lower extremity edema - patient reports pain going down legs from lower back - walking make pains worse - ) and Cough (Dry cough lasting about 3 weeks)    _______________________________________________  History of Presenting Illness:  History was obtained from the patient. Tracey Alarcon is a 84 y.o. female.     Follow-up     Urti 3 weeks ago   Has persistent cough since then  Persistent itching behind throat   Supportive measures seems to be helping, advised to continue    Bilateral lower extremity swelling  Started a week ago  Compliant with diet, not eating a lot of salt as per patient.  Not eating fried foods  Echo few months ago, normal ef, abnormal diastolic dysfunction    BP Readings from Last 3 Encounters:   06/19/25 100/60   05/08/25 134/72   04/24/25 106/68         Lab Results   Component Value Date    HGB 12.7 04/15/2025    LABA1C 5.6 04/04/2025    CHOL 149 04/04/2025    HDL 62 04/04/2025    LDL 75 04/04/2025    TRIG 59 04/04/2025    VITD25 75.0 04/26/2024    CREATININE 0.6 04/22/2025     _____________________________________________________  Past Medical/Surgical History:        Diagnosis Date    Arthritis     Borderline diabetic     controlled with diet    Bronchitis 12/12/2023    CAD (coronary artery disease)     Class 3 severe obesity without serious comorbidity with body mass index (BMI) of 45.0 to 49.9 in adult

## 2025-07-03 ENCOUNTER — TELEPHONE (OUTPATIENT)
Dept: INTERNAL MEDICINE CLINIC | Age: 85
End: 2025-07-03

## 2025-07-03 NOTE — TELEPHONE ENCOUNTER
LVM - informed patient appointment on 10/24 has to be cancelled and to call back to reschedule or use mychart.

## 2025-07-09 ENCOUNTER — OFFICE VISIT (OUTPATIENT)
Dept: NEUROLOGY | Age: 85
End: 2025-07-09
Payer: MEDICARE

## 2025-07-09 VITALS
SYSTOLIC BLOOD PRESSURE: 168 MMHG | HEIGHT: 62 IN | HEART RATE: 70 BPM | WEIGHT: 215 LBS | DIASTOLIC BLOOD PRESSURE: 73 MMHG | BODY MASS INDEX: 39.56 KG/M2

## 2025-07-09 DIAGNOSIS — I69.30 SEQUELAE, POST-STROKE: ICD-10-CM

## 2025-07-09 DIAGNOSIS — I67.1 ANEURYSM OF INTRACRANIAL PORTION OF LEFT INTERNAL CAROTID ARTERY: Primary | ICD-10-CM

## 2025-07-09 DIAGNOSIS — I67.1 ANEURYSM OF CAVERNOUS PORTION OF LEFT INTERNAL CAROTID ARTERY: ICD-10-CM

## 2025-07-09 PROCEDURE — 99215 OFFICE O/P EST HI 40 MIN: CPT | Performed by: PSYCHIATRY & NEUROLOGY

## 2025-07-09 PROCEDURE — 1123F ACP DISCUSS/DSCN MKR DOCD: CPT | Performed by: PSYCHIATRY & NEUROLOGY

## 2025-07-09 PROCEDURE — 1125F AMNT PAIN NOTED PAIN PRSNT: CPT | Performed by: PSYCHIATRY & NEUROLOGY

## 2025-07-09 PROCEDURE — G8427 DOCREV CUR MEDS BY ELIG CLIN: HCPCS | Performed by: PSYCHIATRY & NEUROLOGY

## 2025-07-09 PROCEDURE — G8417 CALC BMI ABV UP PARAM F/U: HCPCS | Performed by: PSYCHIATRY & NEUROLOGY

## 2025-07-09 PROCEDURE — 1090F PRES/ABSN URINE INCON ASSESS: CPT | Performed by: PSYCHIATRY & NEUROLOGY

## 2025-07-09 PROCEDURE — G8399 PT W/DXA RESULTS DOCUMENT: HCPCS | Performed by: PSYCHIATRY & NEUROLOGY

## 2025-07-09 PROCEDURE — 1159F MED LIST DOCD IN RCRD: CPT | Performed by: PSYCHIATRY & NEUROLOGY

## 2025-07-09 PROCEDURE — 1036F TOBACCO NON-USER: CPT | Performed by: PSYCHIATRY & NEUROLOGY

## 2025-07-09 PROCEDURE — 3077F SYST BP >= 140 MM HG: CPT | Performed by: PSYCHIATRY & NEUROLOGY

## 2025-07-09 PROCEDURE — 3078F DIAST BP <80 MM HG: CPT | Performed by: PSYCHIATRY & NEUROLOGY

## 2025-07-09 NOTE — PROGRESS NOTES
Endovascular Neurosurgery - [place] [address]  [place] [address]  P: [phone]  F: [phone]      Dear Dr. Rosado    HPI:     History of Present Illness  Tracey Alarcon is a 84 y.o. female who presents for a follow-up visit after her most recent consultation with Dr. Moyer on 04/21/2025. 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    She reports no eye pain or changes in vision. However, she experiences headaches upon waking up each morning, which she attributes to a tooth that was due for extraction in 03/2025. She believes her headaches are related to this tooth issue. She is currently on Plavix and aspirin, and her dentist is awaiting clearance from her cardiologist to proceed with the extraction.    Following her second surgery, she experienced a left mca stroke, resulting in some residual effects. She can use her hand and leg without dragging it. She can walk short distances and uses a walker at home and when she is out. She also reports significant discomfort in her arm, which she believes is due to a rotator cuff issue exacerbated by the stroke. She is undergoing therapy for this, which includes weight lifting and various arm movements. The soreness in her arm has largely subsided. She has been advised to knit as part of her therapy and has completed 25 to 30 dishcloths. She continues to struggle with writing and occasionally experiences speech difficulties, particularly when fatigued. She engages in daily exercises and has been commended for her compliance with therapy. She also enjoys gardening and spends time outdoors when the weather is cool.    Her blood pressure medication, amlodipine, was reduced to 240. She monitors her blood pressure at home and reports the readings to Dr. Rosado. She is scheduled to see him in 10/2025.    INTERVAL: Since last visit, she has continued to experience headaches, which she

## 2025-07-10 DIAGNOSIS — I25.10 CORONARY ARTERY DISEASE INVOLVING NATIVE CORONARY ARTERY OF NATIVE HEART WITHOUT ANGINA PECTORIS: ICD-10-CM

## 2025-07-10 RX ORDER — ISOSORBIDE MONONITRATE 60 MG/1
60 TABLET, EXTENDED RELEASE ORAL DAILY
Qty: 90 TABLET | Refills: 0 | OUTPATIENT
Start: 2025-07-10

## 2025-07-14 RX ORDER — AMLODIPINE BESYLATE 2.5 MG/1
2.5 TABLET ORAL NIGHTLY
Qty: 30 TABLET | Refills: 1 | Status: SHIPPED | OUTPATIENT
Start: 2025-07-14

## 2025-07-15 ENCOUNTER — OFFICE VISIT (OUTPATIENT)
Dept: NEUROLOGY | Age: 85
End: 2025-07-15
Payer: MEDICARE

## 2025-07-15 VITALS
BODY MASS INDEX: 39.38 KG/M2 | HEIGHT: 62 IN | SYSTOLIC BLOOD PRESSURE: 136 MMHG | DIASTOLIC BLOOD PRESSURE: 70 MMHG | WEIGHT: 214 LBS | HEART RATE: 76 BPM

## 2025-07-15 DIAGNOSIS — Z86.73 HISTORY OF STROKE: Primary | ICD-10-CM

## 2025-07-15 DIAGNOSIS — I67.1 ANEURYSM OF CAVERNOUS PORTION OF LEFT INTERNAL CAROTID ARTERY: ICD-10-CM

## 2025-07-15 DIAGNOSIS — R41.3 MEMORY CHANGES: ICD-10-CM

## 2025-07-15 PROCEDURE — 1123F ACP DISCUSS/DSCN MKR DOCD: CPT | Performed by: STUDENT IN AN ORGANIZED HEALTH CARE EDUCATION/TRAINING PROGRAM

## 2025-07-15 PROCEDURE — 3078F DIAST BP <80 MM HG: CPT | Performed by: STUDENT IN AN ORGANIZED HEALTH CARE EDUCATION/TRAINING PROGRAM

## 2025-07-15 PROCEDURE — 1159F MED LIST DOCD IN RCRD: CPT | Performed by: STUDENT IN AN ORGANIZED HEALTH CARE EDUCATION/TRAINING PROGRAM

## 2025-07-15 PROCEDURE — 1090F PRES/ABSN URINE INCON ASSESS: CPT | Performed by: STUDENT IN AN ORGANIZED HEALTH CARE EDUCATION/TRAINING PROGRAM

## 2025-07-15 PROCEDURE — 1036F TOBACCO NON-USER: CPT | Performed by: STUDENT IN AN ORGANIZED HEALTH CARE EDUCATION/TRAINING PROGRAM

## 2025-07-15 PROCEDURE — G8399 PT W/DXA RESULTS DOCUMENT: HCPCS | Performed by: STUDENT IN AN ORGANIZED HEALTH CARE EDUCATION/TRAINING PROGRAM

## 2025-07-15 PROCEDURE — G8417 CALC BMI ABV UP PARAM F/U: HCPCS | Performed by: STUDENT IN AN ORGANIZED HEALTH CARE EDUCATION/TRAINING PROGRAM

## 2025-07-15 PROCEDURE — G8427 DOCREV CUR MEDS BY ELIG CLIN: HCPCS | Performed by: STUDENT IN AN ORGANIZED HEALTH CARE EDUCATION/TRAINING PROGRAM

## 2025-07-15 PROCEDURE — 3075F SYST BP GE 130 - 139MM HG: CPT | Performed by: STUDENT IN AN ORGANIZED HEALTH CARE EDUCATION/TRAINING PROGRAM

## 2025-07-15 PROCEDURE — 99214 OFFICE O/P EST MOD 30 MIN: CPT | Performed by: STUDENT IN AN ORGANIZED HEALTH CARE EDUCATION/TRAINING PROGRAM

## 2025-07-15 NOTE — PROGRESS NOTES
Sexually Abused: No   Housing Stability: Low Risk  (4/2/2025)    Housing Stability Vital Sign     Unable to Pay for Housing in the Last Year: No     Number of Times Moved in the Last Year: 0     Homeless in the Last Year: No        Current Outpatient Medications   Medication Sig Dispense Refill    amLODIPine (NORVASC) 2.5 MG tablet Take 1 tablet by mouth nightly Hold if blood pressure less than 110/80 30 tablet 1    Docusate Sodium (COLACE PO) Take by mouth      nitroGLYCERIN (NITROSTAT) 0.4 MG SL tablet Place 1 tablet under the tongue every 5 minutes as needed for Chest pain up to max of 3 total doses. If no relief after 1 dose, call 911. 10 tablet 0    furosemide (LASIX) 20 MG tablet Take 1 tablet by mouth daily as needed (leg swelling) Hold if blood pressure less than 110/80 60 tablet 0    carvedilol (COREG) 12.5 MG tablet Take 1 tablet by mouth 2 times daily Hold if blood pressure less than 110/80 180 tablet 1    Blood Pressure KIT Measure blood pressure once daily. Log it and bring during next office visit. 1 kit 0    atorvastatin (LIPITOR) 40 MG tablet Take 1 tablet by mouth nightly 30 tablet 3    isosorbide mononitrate (IMDUR) 30 MG extended release tablet Take 1 tablet by mouth daily 30 tablet 3    clopidogrel (PLAVIX) 75 MG tablet Take 1 tablet by mouth daily 30 tablet 5    aspirin 81 MG EC tablet Take 1 tablet by mouth daily 10 tablet 0    b complex vitamins capsule Take 1 capsule by mouth daily 10 capsule 0    fexofenadine (ALLEGRA) 180 MG tablet Take 1 tablet by mouth daily as needed (allergies) 10 tablet 2    esomeprazole (NEXIUM 24HR) 20 MG delayed release capsule Take 1 capsule by mouth every morning (before breakfast) 10 capsule 0    ZINC PO Take 1 tablet by mouth daily      vitamin D (CHOLECALCIFEROL) 25 MCG (1000 UT) TABS tablet Take 1 tablet by mouth daily      [employer] PURELAX 17 GM/SCOOP powder Take 17 g by mouth daily as needed      Multiple Vitamins-Minerals (HAIR SKIN AND NAILS FORMULA PO) Take 2

## 2025-07-16 ASSESSMENT — ENCOUNTER SYMPTOMS: SORE THROAT: 1

## 2025-08-11 DIAGNOSIS — R60.0 BILATERAL LOWER EXTREMITY EDEMA: ICD-10-CM

## 2025-08-11 RX ORDER — FUROSEMIDE 20 MG/1
20 TABLET ORAL DAILY PRN
Qty: 90 TABLET | Refills: 0 | Status: SHIPPED | OUTPATIENT
Start: 2025-08-11

## 2025-09-02 PROBLEM — I63.419 CEREBROVASCULAR ACCIDENT (CVA) DUE TO EMBOLISM OF MIDDLE CEREBRAL ARTERY (HCC): Status: RESOLVED | Noted: 2025-04-07 | Resolved: 2025-09-02

## 2025-09-03 ENCOUNTER — PATIENT MESSAGE (OUTPATIENT)
Dept: INTERNAL MEDICINE CLINIC | Age: 85
End: 2025-09-03

## 2025-09-04 RX ORDER — ATORVASTATIN CALCIUM 40 MG/1
40 TABLET, FILM COATED ORAL NIGHTLY
Qty: 90 TABLET | Refills: 1 | Status: SHIPPED | OUTPATIENT
Start: 2025-09-04

## (undated) DEVICE — DEFENDO AIR WATER SUCTION AND BIOPSY VALVE KIT FOR  OLYMPUS: Brand: DEFENDO AIR/WATER/SUCTION AND BIOPSY VALVE

## (undated) DEVICE — SUTURE PERMAHAND SZ 0 L30IN NONABSORBABLE BLK FSL L30MM 3/8 680H

## (undated) DEVICE — ENDO KIT W/SYRINGE: Brand: MEDLINE INDUSTRIES, INC.

## (undated) DEVICE — SUTURE VCRL + SZ 3-0 L27IN ABSRB UD L26MM SH 1/2 CIR VCP416H

## (undated) DEVICE — SUTURE ETHBND EXCEL SZ 0 L18IN NONABSORBABLE GRN L26MM MO-6 CX45D

## (undated) DEVICE — SKIN AFFIX SURG ADHESIVE 72/CS 0.55ML: Brand: MEDLINE

## (undated) DEVICE — ST CHARLES MAJOR ABDOMINAL PK: Brand: MEDLINE INDUSTRIES, INC.

## (undated) DEVICE — 20 ML SYRINGE LUER-LOCK TIP: Brand: MONOJECT

## (undated) DEVICE — HYPODERMIC SAFETY NEEDLE: Brand: MAGELLAN

## (undated) DEVICE — PAD,NON-ADHERENT,3X8,STERILE,LF,1/PK: Brand: MEDLINE

## (undated) DEVICE — SUTURE VCRL + SZ 2-0 L27IN ABSRB UD CT-2 L26MM 1/2 CIR TAPR VCP269H

## (undated) DEVICE — Device

## (undated) DEVICE — GLOVE ORANGE PI 7   MSG9070

## (undated) DEVICE — GOWN,AURORA,NONREINFORCED,LARGE: Brand: MEDLINE

## (undated) DEVICE — SPONGE DRN W4XL4IN RAYON/POLYESTER 6 PLY NONWOVEN PRECUT

## (undated) DEVICE — Z DISCONTINUED GLOVE SURG SZ 7.5 L12IN FNGR THK13MIL WHT ISOLEX

## (undated) DEVICE — PAD,ABDOMINAL,8"X7.5",ST,LF,20/BX: Brand: MEDLINE INDUSTRIES, INC.

## (undated) DEVICE — Z DUP USE 2392665 BLADE LARYNGOSCOPE STAT GLIDESCOPE GVL 4

## (undated) DEVICE — DRESSING TRNSPAR W4XL10IN FLM MIC POR SURESITE 123

## (undated) DEVICE — SINGLE PORT MANIFOLD: Brand: NEPTUNE 2